# Patient Record
Sex: MALE | Race: WHITE | Employment: OTHER | URBAN - METROPOLITAN AREA
[De-identification: names, ages, dates, MRNs, and addresses within clinical notes are randomized per-mention and may not be internally consistent; named-entity substitution may affect disease eponyms.]

---

## 2017-07-17 ENCOUNTER — OP HISTORICAL/CONVERTED ENCOUNTER (OUTPATIENT)
Dept: OTHER | Age: 82
End: 2017-07-17

## 2017-07-19 ENCOUNTER — OP HISTORICAL/CONVERTED ENCOUNTER (OUTPATIENT)
Dept: OTHER | Age: 82
End: 2017-07-19

## 2020-07-27 PROBLEM — N32.0 BLADDER NECK OBSTRUCTION: Status: ACTIVE | Noted: 2020-07-27

## 2020-07-27 PROBLEM — C61 MALIGNANT NEOPLASM OF PROSTATE (HCC): Status: ACTIVE | Noted: 2020-07-27

## 2020-07-27 PROBLEM — R39.15 URGENCY OF URINATION: Status: ACTIVE | Noted: 2020-07-27

## 2020-07-27 PROBLEM — N13.8 ENLARGED PROSTATE WITH URINARY OBSTRUCTION: Status: ACTIVE | Noted: 2020-07-27

## 2020-07-27 PROBLEM — N40.1 ENLARGED PROSTATE WITH URINARY OBSTRUCTION: Status: ACTIVE | Noted: 2020-07-27

## 2020-07-27 PROBLEM — N41.1 CHRONIC PROSTATITIS: Status: ACTIVE | Noted: 2020-07-27

## 2020-07-27 PROBLEM — R31.9 HEMATURIA: Status: ACTIVE | Noted: 2020-07-27

## 2020-07-27 PROBLEM — R97.20 ELEVATED PROSTATE SPECIFIC ANTIGEN (PSA): Status: ACTIVE | Noted: 2020-07-27

## 2020-07-27 PROBLEM — N50.89 SCROTAL MASS: Status: ACTIVE | Noted: 2020-07-27

## 2020-07-27 PROBLEM — N48.9 LESION OF PENIS: Status: ACTIVE | Noted: 2020-07-27

## 2020-07-27 PROBLEM — N28.1 RENAL CYST: Status: ACTIVE | Noted: 2020-07-27

## 2020-07-27 PROBLEM — N30.20 CHRONIC CYSTITIS: Status: ACTIVE | Noted: 2020-07-27

## 2020-08-14 ENCOUNTER — OFFICE VISIT (OUTPATIENT)
Dept: UROLOGY | Age: 85
End: 2020-08-14
Payer: MEDICARE

## 2020-08-14 VITALS
HEIGHT: 65 IN | DIASTOLIC BLOOD PRESSURE: 79 MMHG | BODY MASS INDEX: 27.66 KG/M2 | WEIGHT: 166 LBS | SYSTOLIC BLOOD PRESSURE: 127 MMHG | TEMPERATURE: 98.1 F

## 2020-08-14 DIAGNOSIS — N13.8 ENLARGED PROSTATE WITH URINARY OBSTRUCTION: ICD-10-CM

## 2020-08-14 DIAGNOSIS — R39.15 URGENCY OF URINATION: ICD-10-CM

## 2020-08-14 DIAGNOSIS — N30.20 CHRONIC CYSTITIS: ICD-10-CM

## 2020-08-14 DIAGNOSIS — N40.1 ENLARGED PROSTATE WITH URINARY OBSTRUCTION: ICD-10-CM

## 2020-08-14 DIAGNOSIS — C61 MALIGNANT NEOPLASM OF PROSTATE (HCC): ICD-10-CM

## 2020-08-14 DIAGNOSIS — R33.9 RETENTION, URINE: Primary | ICD-10-CM

## 2020-08-14 DIAGNOSIS — R97.20 ELEVATED PROSTATE SPECIFIC ANTIGEN (PSA): ICD-10-CM

## 2020-08-14 DIAGNOSIS — N28.1 RENAL CYST: ICD-10-CM

## 2020-08-14 DIAGNOSIS — N32.0 BLADDER NECK OBSTRUCTION: ICD-10-CM

## 2020-08-14 DIAGNOSIS — N41.1 CHRONIC PROSTATITIS: ICD-10-CM

## 2020-08-14 LAB
BILIRUB UR QL STRIP: NEGATIVE
GLUCOSE UR-MCNC: NEGATIVE MG/DL
KETONES P FAST UR STRIP-MCNC: NEGATIVE MG/DL
PH UR STRIP: 5 [PH] (ref 4.6–8)
PROT UR QL STRIP: NEGATIVE
PVR POC: NORMAL CC
SP GR UR STRIP: 1.02 (ref 1–1.03)
UA UROBILINOGEN AMB POC: NORMAL (ref 0.2–1)
URINALYSIS CLARITY POC: CLEAR
URINALYSIS COLOR POC: YELLOW
URINE BLOOD POC: NEGATIVE
URINE LEUKOCYTES POC: NEGATIVE
URINE NITRITES POC: NEGATIVE

## 2020-08-14 PROCEDURE — 99213 OFFICE O/P EST LOW 20 MIN: CPT | Performed by: UROLOGY

## 2020-08-14 PROCEDURE — 51798 US URINE CAPACITY MEASURE: CPT | Performed by: UROLOGY

## 2020-08-14 PROCEDURE — 81003 URINALYSIS AUTO W/O SCOPE: CPT | Performed by: UROLOGY

## 2020-08-14 PROCEDURE — G8419 CALC BMI OUT NRM PARAM NOF/U: HCPCS | Performed by: UROLOGY

## 2020-08-14 PROCEDURE — G8427 DOCREV CUR MEDS BY ELIG CLIN: HCPCS | Performed by: UROLOGY

## 2020-08-14 NOTE — PROGRESS NOTES
HPI ROS PE NOTE          History of Present Illness   Chief complaint; Carcinoma of the prostate follow-up  Shawnee Colon is a 80 y.o. male who presents with of the prostate follow-up Kimi 6 disease found on biopsy in 2011 and one specimen after PSA went up to 8.0 while on dutasteride. The patient had brachytherapy treatment at that time. Had a prostate nodule which was the focus of the cancer and therefore he was given a boost of additional radiation therapy because of the nodule. Patient had a prior negative prostate biopsy done in 2005 when his PSA was 6.4. PSAs have remained normal in the low range ever since. Most recent PSA prior to this visit was less than 0.1 in September 2019. Patient has bladder outlet obstruction International prostate symptom score of 12. Stream and incomplete emptying of his bladder half the time, nocturia twice per night sometimes once urgency and frequency less than half the time and intermittency less than 1 time in 5 voids; continues to take finasteride 5 mg daily treatment. Previously on alfuzosin and I am not sure whether he is currently taking it ; patient has had additional difficulties with urinary and scrotal and pelvic burning intermittently and has occasional incontinence. He is generally satisfied with his voiding pattern.    Past Medical History:   Diagnosis Date    Arthritis     Asthma     Burning with urination     Cancer [facility])     Prostate Finished radiation 3/2012    COPD     Essential tremor     GERD (gastroesophageal reflux disease)     Hyperlipidemia     Hypertension     PAD [facility]peripheral artery disease) ([facility])       Past Surgical History:   Procedure Laterality Date    HC STNT SFA VIBN WLGO -H1  12/12/2005    LEFT Superficial Femoral Arter Stent    HX ENDOSCOPY      HX GI      Bleeding ulcers    HX HEART CATHETERIZATION      HX HEENT      L eye    HX HEENT      Cataract    HX ORTHOPAEDIC      R & L Carpal tunnel    HX UROLOGICAL Prostate Bx    HX UROLOGICAL      Brachytherapy     Family History   Problem Relation Age of Onset   [facility] Diabetes Mother     Diabetes Sister     Diabetes Brother       Social History     Tobacco Use    Smoking status: Former Smoker    Smokeless tobacco: Never Used   Substance Use Topics    Alcohol use: Not Currently     Comment: twice a month       Prior to Admission medications    Medication Sig Start Date End Date Taking? Authorizing Provider   ezetimibe (Zetia) 10 mg tablet Take  by mouth. Yes Provider, Historical   mometasone (NASONEX) 50 mcg/actuation nasal spray 2 Sprays daily. Yes Provider, Historical   cetirizine (ZYRTEC) 10 mg tablet Take  by mouth daily. Yes Provider, Historical   omega-3 fatty acids-vitamin e (FISH OIL) 1,000 mg Cap Take 1 Cap by mouth. Yes Provider, Historical   fluticasone-salmeterol (ADVAIR DISKUS) 250-50 mcg/Dose diskus inhaler Take 1 Puff by inhalation every twelve (12) hours. Yes Provider, Historical   simvastatin (ZOCOR) 40 mg tablet Take  by mouth nightly. Yes Provider, Historical   alfuzosin (UROXATRAL) 10 mg SR tablet Take  by mouth daily. Yes Provider, Historical   montelukast (SINGULAIR) 10 mg tablet Take 10 mg by mouth daily. Yes Provider, Historical   albuterol (PROVENTIL VENTOLIN) 2.5 mg /3 mL (0.083 %) nebulizer solution by Nebulization route once. Yes Provider, Historical   Calcium Carbonate-Vit D3-Min (CALTRATE 600+D PLUS MINERALS) 600 mg (1,500 mg)-400 unit Chew Take  by mouth. Yes Provider, Historical   MV-Min-Folic Acid-Lutein (CENTRUM SILVER) 500-250 mcg Chew Take  by mouth. Yes Provider, Historical   topiramate (TOPAMAX) 50 mg tablet Take  by mouth two (2) times a day. Provider, Historical   candesartan (ATACAND) 8 mg tablet Take  by mouth daily. Provider, Historical   finasteride (PROPECIA) 1 mg tablet Take 1 mg by mouth daily. Provider, Historical   psyllium (METAMUCIL SMOOTH TEXTURE) packet Take 1 Packet by mouth daily. Provider, Historical   gabapentin (NEURONTIN) 300 mg capsule Take 100 mg by mouth nightly. 6/8/11   Provider, Historical   esomeprazole (NEXIUM) 40 mg capsule Take  by mouth daily. Provider, Historical   hydrocortisone (HYCORT) 1 % ointment Apply  to affected area two (2) times a day. use thin layer     Provider, Historical   aspirin delayed-release 81 mg tablet Take 81 mg by mouth daily. Provider, Historical     No Known Allergies     Review of Systems:  Constitutional: negative  Respiratory: positive for emphysema  Cardiovascular: negative  Gastrointestinal: positive for dyspepsia, reflux symptoms, constipation and history of Arndt's esophagus, stable  Genitourinary:positive for frequency, dysuria, nocturia, urinary incontinence, decreased stream and Urgency and intermittency  Musculoskeletal:positive for myalgias, arthralgias, stiff joints and muscle weakness  Neurological: positive for coordination problems, gait problems and tremor     Physical Exam             Physical Exam:   Visit Vitals  /79   Temp 98.1 °F (36.7 °C) (Oral)   Ht 5' 5\" (1.651 m)   Wt 166 lb (75.3 kg)   BMI 27.62 kg/m²     General appearance: alert, cooperative, no distress, appears stated age  Head: Normocephalic, without obvious abnormality, atraumatic  Lungs: clear to auscultation bilaterally  Heart: regular rate and rhythm, S1, S2 normal, no murmur, click, rub or gallop  Abdomen: soft, non-tender.  Bowel sounds normal. No masses,  no organomegaly  Male genitalia: normal  Rectal: Normal tone, prostate enlarged flat no nodule  Extremities: extremities normal, atraumatic, no cyanosis, previous edema controlled with support hose  Skin: Skin color, texture, turgor normal. No rashes or lesions  Neurologic: Grossly normal        Data Review:   Recent Results (from the past 48 hour(s))   AMB POC URINALYSIS DIP STICK AUTO W/O MICRO    Collection Time: 08/14/20 11:11 AM   Result Value Ref Range    Color (UA POC) Yellow     Clarity (UA POC) Clear     Glucose (UA POC) Negative Negative    Bilirubin (UA POC) Negative Negative    Ketones (UA POC) Negative Negative    Specific gravity (UA POC) 1.025 1.001 - 1.035    Blood (UA POC) Negative Negative    pH (UA POC) 5.0 4.6 - 8.0    Protein (UA POC) Negative Negative    Urobilinogen (UA POC) 0.2 mg/dL 0.2 - 1    Nitrites (UA POC) Negative Negative    Leukocyte esterase (UA POC) Negative Negative   AMB POC PVR, KAROLYN,POST-VOID RES,US,NON-IMAGING    Collection Time: 08/14/20 12:02 PM   Result Value Ref Range    PVR 21mL cc     No results for input(s): 48 in the last 72 hours. Assessment and Plan:   Carcinoma of the prostate, Kimi 6 with bladder neck obstruction and symptoms of obstruction; recommend follow-up PSA test to assess stability of the cancer  History of incomplete bladder emptying, scan performed today demonstrated only a small residual urine. Return visit in 6 months with recheck of PSA. Provide letter to address [employer] disability related to prostate cancer    Mr. Alise Osgood has a reminder for a \"due or due soon\" health maintenance. I have asked that he contact his primary care provider for follow-up on this health maintenance. Casa Guy M.D.  8/14/2020

## 2020-08-14 NOTE — LETTER
8/14/20 Patient: Clarita Bynum YOB: 1931 Date of Visit: 8/14/2020 Amanda Moreno MD 
[address] [place] [id number] VIA Facsimile: [phone] Dear Amanda Moreno MD, Thank you for referring Mr. Deborah Gonzalez to [address] [place] [address] for evaluation. My notes for this consultation are attached. If you have questions, please do not hesitate to call me. I look forward to following your patient along with you. Sincerely, Juliet Meyer MD

## 2020-08-15 LAB — PSA SERPL-MCNC: <0.1 NG/ML (ref 0–4)

## 2020-08-16 LAB — BACTERIA UR CULT: NORMAL

## 2020-08-21 ENCOUNTER — TELEPHONE (OUTPATIENT)
Dept: UROLOGY | Age: 85
End: 2020-08-21

## 2020-08-21 NOTE — TELEPHONE ENCOUNTER
Pt's niece called and stated patient was in need of a refill of finasteride.     Pt's pharmacy is [place], [place].

## 2020-08-24 RX ORDER — FINASTERIDE 5 MG/1
5 TABLET, FILM COATED ORAL DAILY
Qty: 90 TAB | Refills: 2 | Status: SHIPPED | OUTPATIENT
Start: 2020-08-24 | End: 2021-05-21

## 2020-08-24 RX ORDER — FINASTERIDE 5 MG/1
5 TABLET, FILM COATED ORAL DAILY
COMMUNITY
End: 2020-08-24 | Stop reason: SDUPTHER

## 2020-08-24 NOTE — TELEPHONE ENCOUNTER
Alice Duarte ph # [phone] called to check status of Finasteride refill for her uncle. She was advised the request was submitted but unsure if it had not been authorized by Dr Tom Albright of if it was not submitting to [facility] successfully but this would be resubmitted. She asks that a call be made to her to advise of when this is completed and we can leave a message on her VM if she is unable to get to the phone.

## 2021-07-01 ENCOUNTER — HOSPITAL ENCOUNTER (OUTPATIENT)
Dept: LAB | Age: 86
Discharge: HOME | End: 2021-07-01

## 2021-07-01 LAB
ALBUMIN SERPL-MCNC: 2.9 G/DL (ref 3.5–5)
ALBUMIN/GLOB SERPL: 0.7 {RATIO} (ref 1.1–2.2)
ALP SERPL-CCNC: 56 U/L (ref 45–117)
ALT SERPL-CCNC: 78 U/L (ref 12–78)
ANION GAP SERPL CALC-SCNC: 6 MMOL/L (ref 5–15)
AST SERPL W P-5'-P-CCNC: 37 U/L (ref 15–37)
BASOPHILS # BLD: 0 K/UL (ref 0–0.1)
BASOPHILS NFR BLD: 1 % (ref 0–1)
BILIRUB SERPL-MCNC: 0.5 MG/DL (ref 0.2–1)
BUN SERPL-MCNC: 21 MG/DL (ref 6–20)
BUN/CREAT SERPL: 27 (ref 12–20)
CA-I BLD-MCNC: 8.9 MG/DL (ref 8.5–10.1)
CHLORIDE SERPL-SCNC: 107 MMOL/L (ref 97–108)
CO2 SERPL-SCNC: 25 MMOL/L (ref 21–32)
CREAT SERPL-MCNC: 0.79 MG/DL (ref 0.7–1.3)
DIFFERENTIAL METHOD BLD: ABNORMAL
EOSINOPHIL # BLD: 0.2 K/UL (ref 0–0.4)
EOSINOPHIL NFR BLD: 2 % (ref 0–7)
ERYTHROCYTE [DISTWIDTH] IN BLOOD BY AUTOMATED COUNT: 14.5 % (ref 11.5–14.5)
GLOBULIN SER CALC-MCNC: 3.9 G/DL (ref 2–4)
GLUCOSE SERPL-MCNC: 92 MG/DL (ref 65–100)
HCT VFR BLD AUTO: 37.7 % (ref 36.6–50.3)
HGB BLD-MCNC: 11.8 G/DL (ref 12.1–17)
IMM GRANULOCYTES # BLD AUTO: 0 K/UL (ref 0–0.04)
IMM GRANULOCYTES NFR BLD AUTO: 0 % (ref 0–0.5)
LYMPHOCYTES # BLD: 1.4 K/UL (ref 0.8–3.5)
LYMPHOCYTES NFR BLD: 18 % (ref 12–49)
MCH RBC QN AUTO: 30.7 PG (ref 26–34)
MCHC RBC AUTO-ENTMCNC: 31.3 G/DL (ref 30–36.5)
MCV RBC AUTO: 98.2 FL (ref 80–99)
MONOCYTES # BLD: 0.7 K/UL (ref 0–1)
MONOCYTES NFR BLD: 9 % (ref 5–13)
NEUTS SEG # BLD: 5.6 K/UL (ref 1.8–8)
NEUTS SEG NFR BLD: 70 % (ref 32–75)
NRBC # BLD: 0 K/UL (ref 0–0.01)
NRBC BLD-RTO: 0 PER 100 WBC
PLATELET # BLD AUTO: 131 K/UL (ref 150–400)
PMV BLD AUTO: 12.1 FL (ref 8.9–12.9)
POTASSIUM SERPL-SCNC: 4.3 MMOL/L (ref 3.5–5.1)
PROT SERPL-MCNC: 6.8 G/DL (ref 6.4–8.2)
RBC # BLD AUTO: 3.84 M/UL (ref 4.1–5.7)
SODIUM SERPL-SCNC: 138 MMOL/L (ref 136–145)
WBC # BLD AUTO: 8 K/UL (ref 4.1–11.1)

## 2021-07-01 PROCEDURE — 80053 COMPREHEN METABOLIC PANEL: CPT

## 2021-07-01 PROCEDURE — 85025 COMPLETE CBC W/AUTO DIFF WBC: CPT

## 2021-08-04 PROBLEM — E78.5 HYPERLIPIDEMIA: Status: ACTIVE | Noted: 2021-08-04

## 2021-08-04 PROBLEM — R06.00 DYSPNEA: Status: ACTIVE | Noted: 2021-08-04

## 2021-08-04 PROBLEM — R25.1 TREMOR: Status: ACTIVE | Noted: 2021-08-04

## 2021-08-04 PROBLEM — I10 BENIGN ESSENTIAL HYPERTENSION: Status: ACTIVE | Noted: 2021-08-04

## 2021-08-08 PROBLEM — R32 URINARY INCONTINENCE: Status: ACTIVE | Noted: 2021-08-08

## 2021-08-08 PROBLEM — N30.20 CHRONIC CYSTITIS: Status: RESOLVED | Noted: 2020-07-27 | Resolved: 2021-08-08

## 2021-08-08 PROBLEM — R97.20 ELEVATED PROSTATE SPECIFIC ANTIGEN (PSA): Status: RESOLVED | Noted: 2020-07-27 | Resolved: 2021-08-08

## 2021-08-08 PROBLEM — R39.15 URGENCY OF URINATION: Status: RESOLVED | Noted: 2020-07-27 | Resolved: 2021-08-08

## 2021-08-08 PROBLEM — N32.0 BLADDER NECK OBSTRUCTION: Status: RESOLVED | Noted: 2020-07-27 | Resolved: 2021-08-08

## 2021-08-08 PROBLEM — R32 URINARY INCONTINENCE: Status: RESOLVED | Noted: 2021-08-08 | Resolved: 2021-08-08

## 2022-07-11 ENCOUNTER — HOSPITAL ENCOUNTER (INPATIENT)
Age: 87
LOS: 3 days | Discharge: NURSING, REHAB OR LONG-TERM CARE | DRG: 956 | End: 2022-07-14
Attending: EMERGENCY MEDICINE | Admitting: INTERNAL MEDICINE
Payer: MEDICARE

## 2022-07-11 ENCOUNTER — APPOINTMENT (OUTPATIENT)
Dept: CT IMAGING | Age: 87
DRG: 956 | End: 2022-07-11
Attending: EMERGENCY MEDICINE
Payer: MEDICARE

## 2022-07-11 ENCOUNTER — APPOINTMENT (OUTPATIENT)
Dept: GENERAL RADIOLOGY | Age: 87
DRG: 956 | End: 2022-07-11
Attending: ORTHOPAEDIC SURGERY
Payer: MEDICARE

## 2022-07-11 ENCOUNTER — APPOINTMENT (OUTPATIENT)
Dept: GENERAL RADIOLOGY | Age: 87
DRG: 956 | End: 2022-07-11
Attending: EMERGENCY MEDICINE
Payer: MEDICARE

## 2022-07-11 ENCOUNTER — APPOINTMENT (OUTPATIENT)
Dept: GENERAL RADIOLOGY | Age: 87
DRG: 956 | End: 2022-07-11
Attending: PHYSICIAN ASSISTANT
Payer: MEDICARE

## 2022-07-11 DIAGNOSIS — N32.89 MASS OF URINARY BLADDER: ICD-10-CM

## 2022-07-11 DIAGNOSIS — S70.12XA HEMATOMA OF LEFT ILIOPSOAS MUSCLE, INITIAL ENCOUNTER: ICD-10-CM

## 2022-07-11 DIAGNOSIS — T79.6XXA TRAUMATIC RHABDOMYOLYSIS, INITIAL ENCOUNTER (HCC): ICD-10-CM

## 2022-07-11 DIAGNOSIS — S72.142A INTERTROCHANTERIC FRACTURE OF LEFT HIP, CLOSED, INITIAL ENCOUNTER (HCC): Primary | ICD-10-CM

## 2022-07-11 DIAGNOSIS — S22.42XA CLOSED FRACTURE OF MULTIPLE RIBS OF LEFT SIDE, INITIAL ENCOUNTER: ICD-10-CM

## 2022-07-11 PROBLEM — S72.009A FRACTURE, HIP (HCC): Status: ACTIVE | Noted: 2022-07-11

## 2022-07-11 PROBLEM — M62.82 RHABDOMYOLYSIS: Status: ACTIVE | Noted: 2022-07-11

## 2022-07-11 LAB
ALBUMIN SERPL-MCNC: 3.3 G/DL (ref 3.5–5)
ALBUMIN/GLOB SERPL: 0.8 {RATIO} (ref 1.1–2.2)
ALP SERPL-CCNC: 66 U/L (ref 45–117)
ALT SERPL-CCNC: 35 U/L (ref 12–78)
AMMONIA PLAS-SCNC: <10 UMOL/L
ANION GAP SERPL CALC-SCNC: 7 MMOL/L (ref 5–15)
APPEARANCE UR: CLEAR
AST SERPL W P-5'-P-CCNC: 104 U/L (ref 15–37)
ATRIAL RATE: 84 BPM
BACTERIA URNS QL MICRO: NEGATIVE /HPF
BASOPHILS # BLD: 0 K/UL (ref 0–0.1)
BASOPHILS NFR BLD: 0 % (ref 0–1)
BILIRUB SERPL-MCNC: 0.9 MG/DL (ref 0.2–1)
BILIRUB UR QL: NEGATIVE
BUN SERPL-MCNC: 43 MG/DL (ref 6–20)
BUN/CREAT SERPL: 39 (ref 12–20)
CA-I BLD-MCNC: 9.2 MG/DL (ref 8.5–10.1)
CALCULATED R AXIS, ECG10: 77 DEGREES
CALCULATED T AXIS, ECG11: 62 DEGREES
CHLORIDE SERPL-SCNC: 104 MMOL/L (ref 97–108)
CK SERPL-CCNC: 1151 U/L (ref 39–308)
CK SERPL-CCNC: 1679 U/L (ref 39–308)
CK SERPL-CCNC: 2251 U/L (ref 39–308)
CO2 SERPL-SCNC: 28 MMOL/L (ref 21–32)
COLOR UR: ABNORMAL
COVID-19 RAPID TEST, COVR: NOT DETECTED
CREAT SERPL-MCNC: 1.1 MG/DL (ref 0.7–1.3)
DIAGNOSIS, 93000: NORMAL
DIFFERENTIAL METHOD BLD: ABNORMAL
EOSINOPHIL # BLD: 0 K/UL (ref 0–0.4)
EOSINOPHIL NFR BLD: 0 % (ref 0–7)
ERYTHROCYTE [DISTWIDTH] IN BLOOD BY AUTOMATED COUNT: 14.8 % (ref 11.5–14.5)
GLOBULIN SER CALC-MCNC: 4.4 G/DL (ref 2–4)
GLUCOSE SERPL-MCNC: 135 MG/DL (ref 65–100)
GLUCOSE UR STRIP.AUTO-MCNC: NEGATIVE MG/DL
HCT VFR BLD AUTO: 37 % (ref 36.6–50.3)
HGB BLD-MCNC: 11.9 G/DL (ref 12.1–17)
HGB UR QL STRIP: NEGATIVE
IMM GRANULOCYTES # BLD AUTO: 0.1 K/UL (ref 0–0.04)
IMM GRANULOCYTES NFR BLD AUTO: 0 % (ref 0–0.5)
INR PPP: 1.6 (ref 0.9–1.1)
KETONES UR QL STRIP.AUTO: 5 MG/DL
LACTATE SERPL-SCNC: 1.5 MMOL/L (ref 0.4–2)
LACTATE SERPL-SCNC: 2.2 MMOL/L (ref 0.4–2)
LEUKOCYTE ESTERASE UR QL STRIP.AUTO: NEGATIVE
LYMPHOCYTES # BLD: 0.6 K/UL (ref 0.8–3.5)
LYMPHOCYTES NFR BLD: 3 % (ref 12–49)
MAGNESIUM SERPL-MCNC: 2.6 MG/DL (ref 1.6–2.4)
MCH RBC QN AUTO: 30.2 PG (ref 26–34)
MCHC RBC AUTO-ENTMCNC: 32.2 G/DL (ref 30–36.5)
MCV RBC AUTO: 93.9 FL (ref 80–99)
MONOCYTES # BLD: 2 K/UL (ref 0–1)
MONOCYTES NFR BLD: 10 % (ref 5–13)
MRSA DNA SPEC QL NAA+PROBE: NOT DETECTED
MUCOUS THREADS URNS QL MICRO: ABNORMAL /LPF
NEUTS SEG # BLD: 17.9 K/UL (ref 1.8–8)
NEUTS SEG NFR BLD: 87 % (ref 32–75)
NITRITE UR QL STRIP.AUTO: NEGATIVE
NRBC # BLD: 0 K/UL (ref 0–0.01)
NRBC BLD-RTO: 0 PER 100 WBC
PH UR STRIP: 5 [PH] (ref 5–8)
PLATELET # BLD AUTO: 175 K/UL (ref 150–400)
PMV BLD AUTO: 12 FL (ref 8.9–12.9)
POTASSIUM SERPL-SCNC: 5.1 MMOL/L (ref 3.5–5.1)
PROCALCITONIN SERPL-MCNC: 0.24 NG/ML
PROT SERPL-MCNC: 7.7 G/DL (ref 6.4–8.2)
PROT UR STRIP-MCNC: NEGATIVE MG/DL
PROTHROMBIN TIME: 19.2 SEC (ref 11.9–14.6)
Q-T INTERVAL, ECG07: 396 MS
QRS DURATION, ECG06: 150 MS
QTC CALCULATION (BEZET), ECG08: 467 MS
RBC # BLD AUTO: 3.94 M/UL (ref 4.1–5.7)
RBC #/AREA URNS HPF: ABNORMAL /HPF (ref 0–5)
SODIUM SERPL-SCNC: 139 MMOL/L (ref 136–145)
SP GR UR REFRACTOMETRY: 1.02 (ref 1–1.03)
TROPONIN-HIGH SENSITIVITY: 38 NG/L (ref 0–76)
TROPONIN-HIGH SENSITIVITY: 43 NG/L (ref 0–76)
TROPONIN-HIGH SENSITIVITY: 43 NG/L (ref 0–76)
TROPONIN-HIGH SENSITIVITY: 48 NG/L (ref 0–76)
UA: UC IF INDICATED,UAUC: ABNORMAL
UROBILINOGEN UR QL STRIP.AUTO: 0.1 EU/DL (ref 0.1–1)
VENTRICULAR RATE, ECG03: 84 BPM
WBC # BLD AUTO: 20.6 K/UL (ref 4.1–11.1)
WBC URNS QL MICRO: ABNORMAL /HPF (ref 0–4)

## 2022-07-11 PROCEDURE — 87040 BLOOD CULTURE FOR BACTERIA: CPT

## 2022-07-11 PROCEDURE — 81001 URINALYSIS AUTO W/SCOPE: CPT

## 2022-07-11 PROCEDURE — 73502 X-RAY EXAM HIP UNI 2-3 VIEWS: CPT

## 2022-07-11 PROCEDURE — 36415 COLL VENOUS BLD VENIPUNCTURE: CPT

## 2022-07-11 PROCEDURE — 87641 MR-STAPH DNA AMP PROBE: CPT

## 2022-07-11 PROCEDURE — 82550 ASSAY OF CK (CPK): CPT

## 2022-07-11 PROCEDURE — 99285 EMERGENCY DEPT VISIT HI MDM: CPT

## 2022-07-11 PROCEDURE — 74011000250 HC RX REV CODE- 250: Performed by: INTERNAL MEDICINE

## 2022-07-11 PROCEDURE — 72125 CT NECK SPINE W/O DYE: CPT

## 2022-07-11 PROCEDURE — 92610 EVALUATE SWALLOWING FUNCTION: CPT

## 2022-07-11 PROCEDURE — 74177 CT ABD & PELVIS W/CONTRAST: CPT

## 2022-07-11 PROCEDURE — 71260 CT THORAX DX C+: CPT

## 2022-07-11 PROCEDURE — 71045 X-RAY EXAM CHEST 1 VIEW: CPT

## 2022-07-11 PROCEDURE — 87635 SARS-COV-2 COVID-19 AMP PRB: CPT

## 2022-07-11 PROCEDURE — 74011000636 HC RX REV CODE- 636: Performed by: EMERGENCY MEDICINE

## 2022-07-11 PROCEDURE — 73552 X-RAY EXAM OF FEMUR 2/>: CPT

## 2022-07-11 PROCEDURE — 80053 COMPREHEN METABOLIC PANEL: CPT

## 2022-07-11 PROCEDURE — 74011250636 HC RX REV CODE- 250/636: Performed by: INTERNAL MEDICINE

## 2022-07-11 PROCEDURE — 84145 PROCALCITONIN (PCT): CPT

## 2022-07-11 PROCEDURE — 73030 X-RAY EXAM OF SHOULDER: CPT

## 2022-07-11 PROCEDURE — 82140 ASSAY OF AMMONIA: CPT

## 2022-07-11 PROCEDURE — 96374 THER/PROPH/DIAG INJ IV PUSH: CPT

## 2022-07-11 PROCEDURE — 65270000029 HC RM PRIVATE

## 2022-07-11 PROCEDURE — 87086 URINE CULTURE/COLONY COUNT: CPT

## 2022-07-11 PROCEDURE — 85610 PROTHROMBIN TIME: CPT

## 2022-07-11 PROCEDURE — 83735 ASSAY OF MAGNESIUM: CPT

## 2022-07-11 PROCEDURE — 83605 ASSAY OF LACTIC ACID: CPT

## 2022-07-11 PROCEDURE — 74011000258 HC RX REV CODE- 258: Performed by: EMERGENCY MEDICINE

## 2022-07-11 PROCEDURE — 85025 COMPLETE CBC W/AUTO DIFF WBC: CPT

## 2022-07-11 PROCEDURE — 70450 CT HEAD/BRAIN W/O DYE: CPT

## 2022-07-11 PROCEDURE — 84484 ASSAY OF TROPONIN QUANT: CPT

## 2022-07-11 PROCEDURE — 93005 ELECTROCARDIOGRAM TRACING: CPT

## 2022-07-11 PROCEDURE — 74011250637 HC RX REV CODE- 250/637: Performed by: PHYSICIAN ASSISTANT

## 2022-07-11 PROCEDURE — 73010 X-RAY EXAM OF SHOULDER BLADE: CPT

## 2022-07-11 PROCEDURE — 73080 X-RAY EXAM OF ELBOW: CPT

## 2022-07-11 PROCEDURE — 74011250636 HC RX REV CODE- 250/636: Performed by: EMERGENCY MEDICINE

## 2022-07-11 RX ORDER — LEVOFLOXACIN 5 MG/ML
750 INJECTION, SOLUTION INTRAVENOUS EVERY 24 HOURS
Status: DISCONTINUED | OUTPATIENT
Start: 2022-07-11 | End: 2022-07-11

## 2022-07-11 RX ORDER — LEVOFLOXACIN 5 MG/ML
750 INJECTION, SOLUTION INTRAVENOUS
Status: DISCONTINUED | OUTPATIENT
Start: 2022-07-13 | End: 2022-07-14 | Stop reason: HOSPADM

## 2022-07-11 RX ORDER — IPRATROPIUM BROMIDE AND ALBUTEROL SULFATE 2.5; .5 MG/3ML; MG/3ML
3 SOLUTION RESPIRATORY (INHALATION)
Status: DISCONTINUED | OUTPATIENT
Start: 2022-07-11 | End: 2022-07-14 | Stop reason: HOSPADM

## 2022-07-11 RX ORDER — LEVOFLOXACIN 5 MG/ML
750 INJECTION, SOLUTION INTRAVENOUS ONCE
Status: COMPLETED | OUTPATIENT
Start: 2022-07-11 | End: 2022-07-11

## 2022-07-11 RX ORDER — LEVOFLOXACIN 5 MG/ML
750 INJECTION, SOLUTION INTRAVENOUS
Status: DISCONTINUED | OUTPATIENT
Start: 2022-07-13 | End: 2022-07-11 | Stop reason: SDUPTHER

## 2022-07-11 RX ORDER — BUDESONIDE AND FORMOTEROL FUMARATE DIHYDRATE 160; 4.5 UG/1; UG/1
2 AEROSOL RESPIRATORY (INHALATION) 2 TIMES DAILY
Status: DISCONTINUED | OUTPATIENT
Start: 2022-07-12 | End: 2022-07-14 | Stop reason: HOSPADM

## 2022-07-11 RX ORDER — ONDANSETRON 4 MG/1
4 TABLET, ORALLY DISINTEGRATING ORAL
Status: DISCONTINUED | OUTPATIENT
Start: 2022-07-11 | End: 2022-07-14 | Stop reason: HOSPADM

## 2022-07-11 RX ORDER — CELECOXIB 100 MG/1
100 CAPSULE ORAL 2 TIMES DAILY
Status: DISCONTINUED | OUTPATIENT
Start: 2022-07-11 | End: 2022-07-12 | Stop reason: SDUPTHER

## 2022-07-11 RX ORDER — ONDANSETRON 2 MG/ML
4 INJECTION INTRAMUSCULAR; INTRAVENOUS
Status: DISCONTINUED | OUTPATIENT
Start: 2022-07-11 | End: 2022-07-14 | Stop reason: HOSPADM

## 2022-07-11 RX ORDER — ACETAMINOPHEN 650 MG/1
650 SUPPOSITORY RECTAL
Status: DISCONTINUED | OUTPATIENT
Start: 2022-07-11 | End: 2022-07-12 | Stop reason: SDUPTHER

## 2022-07-11 RX ORDER — ACETAMINOPHEN 325 MG/1
650 TABLET ORAL
Status: DISCONTINUED | OUTPATIENT
Start: 2022-07-11 | End: 2022-07-12 | Stop reason: SDUPTHER

## 2022-07-11 RX ORDER — POLYETHYLENE GLYCOL 3350 17 G/17G
17 POWDER, FOR SOLUTION ORAL DAILY PRN
Status: DISCONTINUED | OUTPATIENT
Start: 2022-07-11 | End: 2022-07-14 | Stop reason: HOSPADM

## 2022-07-11 RX ORDER — SODIUM CHLORIDE 0.9 % (FLUSH) 0.9 %
5-40 SYRINGE (ML) INJECTION AS NEEDED
Status: DISCONTINUED | OUTPATIENT
Start: 2022-07-11 | End: 2022-07-12 | Stop reason: SDUPTHER

## 2022-07-11 RX ORDER — SODIUM CHLORIDE 0.9 % (FLUSH) 0.9 %
5-40 SYRINGE (ML) INJECTION EVERY 8 HOURS
Status: DISCONTINUED | OUTPATIENT
Start: 2022-07-11 | End: 2022-07-12 | Stop reason: SDUPTHER

## 2022-07-11 RX ORDER — SODIUM CHLORIDE 9 MG/ML
100 INJECTION, SOLUTION INTRAVENOUS CONTINUOUS
Status: DISCONTINUED | OUTPATIENT
Start: 2022-07-11 | End: 2022-07-12 | Stop reason: SDUPTHER

## 2022-07-11 RX ADMIN — CELECOXIB 100 MG: 100 CAPSULE ORAL at 18:23

## 2022-07-11 RX ADMIN — IOPAMIDOL 100 ML: 755 INJECTION, SOLUTION INTRAVENOUS at 13:19

## 2022-07-11 RX ADMIN — SODIUM CHLORIDE 500 ML: 9 INJECTION, SOLUTION INTRAVENOUS at 17:11

## 2022-07-11 RX ADMIN — PIPERACILLIN AND TAZOBACTAM 4.5 G: 4; .5 INJECTION, POWDER, FOR SOLUTION INTRAVENOUS at 12:02

## 2022-07-11 RX ADMIN — CELECOXIB 100 MG: 100 CAPSULE ORAL at 20:30

## 2022-07-11 RX ADMIN — SODIUM CHLORIDE 1000 ML: 9 INJECTION, SOLUTION INTRAVENOUS at 12:04

## 2022-07-11 RX ADMIN — SODIUM CHLORIDE 1000 ML: 9 INJECTION, SOLUTION INTRAVENOUS at 11:21

## 2022-07-11 RX ADMIN — LEVOFLOXACIN 750 MG: 5 INJECTION, SOLUTION INTRAVENOUS at 16:24

## 2022-07-11 RX ADMIN — SODIUM CHLORIDE, PRESERVATIVE FREE 10 ML: 5 INJECTION INTRAVENOUS at 16:43

## 2022-07-11 RX ADMIN — SODIUM CHLORIDE 100 ML/HR: 9 INJECTION, SOLUTION INTRAVENOUS at 18:25

## 2022-07-11 RX ADMIN — SODIUM CHLORIDE 1000 ML: 9 INJECTION, SOLUTION INTRAVENOUS at 16:18

## 2022-07-11 RX ADMIN — PIPERACILLIN AND TAZOBACTAM 3.38 G: 3; .375 INJECTION, POWDER, LYOPHILIZED, FOR SOLUTION INTRAVENOUS at 20:30

## 2022-07-11 NOTE — ROUTINE PROCESS
TRANSFER - OUT REPORT:    Verbal report given to QUYNH ERI [facility] (name) on Leo Gurrola  being transferred to 440(unit) for routine progression of care       Report consisted of patients Situation, Background, Assessment and   Recommendations(SBAR). Information from the following report(s) SBAR and ED Summary was reviewed with the receiving nurse. Lines:   Peripheral IV 07/11/22 Anterior;Right Hand (Active)        Opportunity for questions and clarification was provided.       Patient transported with:   [email or web address]

## 2022-07-11 NOTE — CONSULTS
ORTHOPEDIC CONSULT    Patient: Lauren Casiano MRN: [id number]  SSN: [id number]    YOB: 1931  Age: 80 y.o. Sex: male      Subjective:      Lauren Casiano is a 80 y.o. male who is being seen in orthopedic consultation in the emergency room for a left hip fracture. Patient is a poor historian. History of dementia versus encephalopathy. History obtained from chart. Apparently the patient lives alone and has a caretaker during the day. EMS was called for wellness check, forced entry and patient was found lying on the floor. Unsure how long the patient was there. It is noted that the patient does have a WebCam he was last seen on Saturday. He appears comfortable in stretcher my arrival.  He is complaining of mild pain of his left hip region. He is also complaining of pain of his left knee. He denies any injury to his head or cervical spine at this time. He is noted to have had pain of his right shoulder. No complaints of pain of his right shoulder at this time. He denies any other musculoskeletal complaints at this time.   Past Medical History:   Diagnosis Date    Arthritis     Asthma     Burning with urination     Cancer [facility])     Prostate Finished radiation 3/2012    COPD     Essential tremor     GERD (gastroesophageal reflux disease)     Hyperlipidemia     Hypertension     PAD (peripheral artery disease) ([facility])      Past Surgical History:   Procedure Laterality Date    HC STNT SFA VIBN WLGO -H1  12/12/2005    LEFT Superficial Femoral Arter Stent    HX ENDOSCOPY      HX GI      Bleeding ulcers    HX HEART CATHETERIZATION      HX HEENT      L eye    HX HEENT      Cataract    HX ORTHOPAEDIC      R & L Carpal tunnel    HX UROLOGICAL  05/11/2011    Prostate Bx    HX UROLOGICAL      Brachytherapy    HX UROLOGICAL  05/11/2011    cysto, BRP      Family History   Problem Relation Age of Onset    Diabetes Mother     Diabetes Sister     Diabetes Brother      Social History Tobacco Use    Smoking status: Former Smoker    Smokeless tobacco: Never Used   Substance Use Topics    Alcohol use: Not Currently     Comment: twice a month      Prior to Admission medications    Medication Sig Start Date End Date Taking? Authorizing Provider   topiramate (TOPAMAX) 50 mg tablet Take  by mouth two (2) times a day. Provider, Historical   ezetimibe (Zetia) 10 mg tablet Take  by mouth. Provider, Historical   mometasone (NASONEX) 50 mcg/actuation nasal spray 2 Sprays daily. Provider, Historical   candesartan (ATACAND) 8 mg tablet Take  by mouth daily. Provider, Historical   psyllium (METAMUCIL SMOOTH TEXTURE) packet Take 1 Packet by mouth daily. Provider, Historical   cetirizine (ZYRTEC) 10 mg tablet Take  by mouth daily. Provider, Historical   gabapentin (NEURONTIN) 300 mg capsule Take 100 mg by mouth nightly. 6/8/11   Provider, Historical   omega-3 fatty acids-vitamin e (FISH OIL) 1,000 mg Cap Take 1 Cap by mouth. Provider, Historical   fluticasone-salmeterol (ADVAIR DISKUS) 250-50 mcg/Dose diskus inhaler Take 1 Puff by inhalation every twelve (12) hours. Provider, Historical   simvastatin (ZOCOR) 40 mg tablet Take  by mouth nightly. Provider, Historical   alfuzosin (UROXATRAL) 10 mg SR tablet Take  by mouth daily. Provider, Historical   montelukast (SINGULAIR) 10 mg tablet Take 10 mg by mouth daily. Provider, Historical   esomeprazole (NEXIUM) 40 mg capsule Take  by mouth daily. Provider, Historical   albuterol (PROVENTIL VENTOLIN) 2.5 mg /3 mL (0.083 %) nebulizer solution by Nebulization route once. Provider, Historical   hydrocortisone (HYCORT) 1 % ointment Apply  to affected area two (2) times a day. use thin layer     Provider, Historical   aspirin delayed-release 81 mg tablet Take 81 mg by mouth daily. Provider, Historical   Calcium Carbonate-Vit D3-Min (CALTRATE 600+D PLUS MINERALS) 600 mg (1,500 mg)-400 unit Chew Take  by mouth.     Provider, Historical   MV-Min-Folic Acid-Lutein (CENTRUM SILVER) 500-250 mcg Chew Take  by mouth. Provider, Historical       Allergies   Allergen Reactions    Nut - Unspecified Not Reported This Time       Review of Systems:  Review of Systems   Respiratory: Negative. Cardiovascular: Negative. Musculoskeletal: Positive for joint pain. Left hip and left knee    review of systems limited      Objective:     Current Facility-Administered Medications   Medication Dose Route Frequency    sodium chloride 0.9 % bolus infusion 1,000 mL  1,000 mL IntraVENous ONCE    Followed by    sodium chloride 0.9 % bolus infusion 500 mL  500 mL IntraVENous ONCE    piperacillin-tazobactam (ZOSYN) 3.375 g in 0.9% sodium chloride (MBP/ADV) 100 mL MBP  3.375 g IntraVENous Q8H    [START ON 7/13/2022] levoFLOXacin (LEVAQUIN) 750 mg in D5W IVPB  750 mg IntraVENous Q48H    levoFLOXacin (LEVAQUIN) 750 mg in D5W IVPB  750 mg IntraVENous ONCE    sodium chloride (NS) flush 5-40 mL  5-40 mL IntraVENous Q8H    sodium chloride (NS) flush 5-40 mL  5-40 mL IntraVENous PRN    acetaminophen (TYLENOL) tablet 650 mg  650 mg Oral Q6H PRN    Or    acetaminophen (TYLENOL) suppository 650 mg  650 mg Rectal Q6H PRN    polyethylene glycol (MIRALAX) packet 17 g  17 g Oral DAILY PRN    ondansetron (ZOFRAN ODT) tablet 4 mg  4 mg Oral Q8H PRN    Or    ondansetron (ZOFRAN) injection 4 mg  4 mg IntraVENous Q6H PRN    0.9% sodium chloride infusion  100 mL/hr IntraVENous CONTINUOUS    albuterol-ipratropium (DUO-NEB) 2.5 MG-0.5 MG/3 ML  3 mL Nebulization Q4H PRN     Current Outpatient Medications   Medication Sig    topiramate (TOPAMAX) 50 mg tablet Take  by mouth two (2) times a day.  ezetimibe (Zetia) 10 mg tablet Take  by mouth.  mometasone (NASONEX) 50 mcg/actuation nasal spray 2 Sprays daily.  candesartan (ATACAND) 8 mg tablet Take  by mouth daily.  psyllium (METAMUCIL SMOOTH TEXTURE) packet Take 1 Packet by mouth daily.     cetirizine (ZYRTEC) 10 mg tablet Take  by mouth daily.  gabapentin (NEURONTIN) 300 mg capsule Take 100 mg by mouth nightly.  omega-3 fatty acids-vitamin e (FISH OIL) 1,000 mg Cap Take 1 Cap by mouth.  fluticasone-salmeterol (ADVAIR DISKUS) 250-50 mcg/Dose diskus inhaler Take 1 Puff by inhalation every twelve (12) hours.  simvastatin (ZOCOR) 40 mg tablet Take  by mouth nightly.  alfuzosin (UROXATRAL) 10 mg SR tablet Take  by mouth daily.  montelukast (SINGULAIR) 10 mg tablet Take 10 mg by mouth daily.  esomeprazole (NEXIUM) 40 mg capsule Take  by mouth daily.  albuterol (PROVENTIL VENTOLIN) 2.5 mg /3 mL (0.083 %) nebulizer solution by Nebulization route once.  hydrocortisone (HYCORT) 1 % ointment Apply  to affected area two (2) times a day. use thin layer     aspirin delayed-release 81 mg tablet Take 81 mg by mouth daily.  Calcium Carbonate-Vit D3-Min (CALTRATE 600+D PLUS MINERALS) 600 mg (1,500 mg)-400 unit Chew Take  by mouth.  MV-Min-Folic Acid-Lutein (CENTRUM SILVER) 500-250 mcg Chew Take  by mouth. Vitals:    07/11/22 1001   BP: 136/72   Pulse: 89   Resp: 14   Temp: 99.3 °F (37.4 °C)   SpO2: 100%   Weight: 74.8 kg (165 lb)   Height: 5' 10\" (1.778 m)        Alert to person, No apparent distress    Physical Exam:  Left lower extremity: No foreshortening seen. External rotation is present of his left lower extremity compared to the right. Skin is warm and dry throughout left lower extremity. There is an abrasion seen over the medial aspect of his left knee. This area was tender to touch. No tenderness palpation throughout rest of left lower extremity. Active range of motion left knee was 0 to 20 degrees limited secondary to pain of his left hip. Passive range of motion was similar limited secondary to pain of his left hip. No calf pain to palpation. DP/PT pulses are not palpable bilaterally. Cap refill is 2 seconds. EHL/dorsiflexion/plantarflexion is 5 out of 5. Negative Homans. Lower extremity appears neurovascularly intact. Right shoulder/upper extremity: There is mild tenderness to palpation over the anterolateral aspect of his right shoulder. Active range of motion is limited with moderate crepitus. There was mild tenderness to passive range of motion to external rotation and AB duction. Full range of motion right humerus wrist hand and fingers without tenderness.  strength is 4 out of 5. EPL intact. Radial pulse palpable. No tenderness palpation throughout the rest of the right upper extremity. Right upper extremity peers neurovascularly intact. Labs:  CBC:  Recent Labs     07/11/22  1030   WBC 20.6*   RBC 3.94*   HGB 11.9*   HCT 37.0   MCV 93.9   RDW 14.8*        CHEMISTRIES:  Recent Labs     07/11/22  1100 07/11/22  1030   NA  --  139   K  --  5.1   CL  --  104   CO2  --  28   BUN  --  43*   CREA  --  1.10   CA  --  9.2   MG 2.6*  --    PT/INR:  Recent Labs     07/11/22  1115   INR 1.6*     APTT:No results for input(s): APTT in the last 72 hours. LIVER PROFILE:  Recent Labs     07/11/22  1030   *   ALT 35       IMAGING:  X-rays taken of his  bilateral hips and pelvis show a slightly displaced intertrochanteric fracture of his left hip. Moderate osteopenia seen throughout pelvic girdle. No other acute fracture seen. CT scan taken of his abdomen pelvis confirms comminuted intertrochanteric fracture of his left hip. There was chronic bilateral rib fractures. Edema/contusion was seen in the left retroperitoneum suspicious of intramuscular hematoma. Right bladder mass is present as well. A 6 mm calcified nodule is noted in the left upper lobe. X-rays taken of his right shoulder show no acute fractures.   Assessment/Plan:     Hospital Problems  Date Reviewed: 8/14/2020          Codes Class Noted POA    Rhabdomyolysis ICD-10-CM: M62.82  ICD-9-CM: 728.88  7/11/2022 Unknown        [facility] ICD-10-CM: S72.009A  ICD-9-CM: 820.8  7/11/2022 Unknown          Intertrochanteric fracture left hip  Given the fact that this patient is a household community ambulator and has been able to provide care for himself mainly I think he would benefit from surgical intervention consisting of open reduction fixation of his right hip  I have placed a call to his caregiver Wilfredo Membreno at [phone] who state Ronny Bowling is his niece and next of kin. She states Dolly Sheldon has been in contact with Dr. Jaswant Turner already. We will plan for surgical intervention once patient is cleared to proceed from medical team and rhabdomyolysis has been worked up. We will order x-rays of his left femur    This patient was examined direct consult with Dr. Jaswant Turner. Thank you for the courtesy of this consult.           Signed By: Esther Hayes PA-C     July 11, 2022

## 2022-07-11 NOTE — ACP (ADVANCE CARE PLANNING)
Advance Care Planning   Healthcare Decision Maker:       Primary Decision Maker: Zechariah Ocampo - [phone]

## 2022-07-11 NOTE — PROGRESS NOTES
SPEECH LANGUAGE PATHOLOGY BEDSIDE SWALLOW EVALUATION  Patient: Ophelia Garcia (20 y.o. male)  Date: 7/11/2022  Primary Diagnosis: Fracture, hip (HCC) [S72.009A]  Rhabdomyolysis [M62.82]        Precautions: aspiration      ASSESSMENT :  Based on the objective data described below, the patient presents with minimal oral and oropharyngeal dysphagia, however functional for age. Oral mucosa dry. Upper and lower dentures in place. Patient tolerate thin liquids via cup w/ minimal swallow delay and adequate HLE and protraction. X1 delayed cough response present after the swallow. No other clinical indicators of penetration or aspiration noted w/ thin. Mastication prolonged w/ reduced bolus formation, minimal oral spreading and anterior spillage. No pharyngeal difficulty noted. Patient reported consuming soft foods at baseline s/t his Arndt's esophagus and diverticulosis. Patient will benefit from skilled intervention to address the above impairments. Patients rehabilitation potential is considered to be Good     PLAN :  Recommendations and Planned Interventions:  Soft and bite size diet, thin liquids. Medications crushed. Assist w/ p.o. intake. Patient w/ hx of Parkinson's and has hand tremors. Aspiration precautions. Frequency/Duration: Patient will be followed by speech-language pathology 2 times a week to address goals. Discharge Recommendations: To Be Determined     SUBJECTIVE:   Patient alert and seen on stretcher in ED. Patient w/ GLF. Hip fx. OBJECTIVE:     CXR Results  (Last 48 hours)                 07/11/22 1102  XR CHEST PORT Final result    Impression:  No acute process. Narrative: Indication: Status post fall, right shoulder pain       Comparison: None       Portable exam of the chest obtained at 1102 demonstrates normal heart size. There is no acute process in the lung fields. The osseous structures are   unremarkable.                   CT Results  (Last 48 hours) 07/11/22 1319  CT HEAD WO CONT Final result    Impression:  No acute intracranial abnormality. There is a LEFT frontal approach  shunt   catheter in place with the tip to the LEFT of midline. Narrative:  EXAM: CT HEAD WO CONT       INDICATION: Fall       COMPARISON: CT head without contrast July 19, 2017. CONTRAST: None. TECHNIQUE: Unenhanced CT of the head was performed using 5 mm images. Brain and   bone windows were generated. Coronal and sagittal reformats. CT dose reduction   was achieved through use of a standardized protocol tailored for this   examination and automatic exposure control for dose modulation. FINDINGS:   The ventricles and sulci are normal in size, shape and configuration. . There is   a LEFT frontal approach shunt catheter in place with the tip to the LEFT of   midline. There is no intracranial hemorrhage, extra-axial collection, or mass   effect. The basilar cisterns are open. No CT evidence of acute infarct. The bone windows demonstrate no abnormalities. The visualized portions of the   paranasal sinuses and mastoid air cells are clear.           07/11/22 1319  CT SPINE CERV WO CONT Final result    Impression:      1. No evidence of acute fracture or subluxation of the cervical spine. 2. Moderate to severe degenerative disc disease at C5-C6 and C6-C7. There is   multilevel right-sided moderate to severe facet arthritis. Narrative:  EXAM:  CT CERVICAL SPINE WITHOUT CONTRAST       INDICATION: Fall. COMPARISON: None. CONTRAST:  None. TECHNIQUE: Multislice helical CT of the cervical spine was performed without   intravenous contrast administration. Sagittal and coronal reformats were   generated. CT dose reduction was achieved through use of a standardized   protocol tailored for this examination and automatic exposure control for dose   modulation. FINDINGS:       The alignment is within normal limits. There is no fracture or compression   deformity. The odontoid process is intact. The craniocervical junction is within   normal limits. Severe disc space narrowing and endplate degeneration are seen at   the C5-C6 and C6-C7 levels. There is bilateral, multilevel facet arthritis which   is worse on the RIGHT side. No bony canal narrowing is appreciated. The neck   soft tissues are grossly unremarkable. A left-sided  shunt catheter is noted. Emphysematous changes are seen at the lung apices. 07/11/22 1319  CT CHEST W CONT Final result    Impression:  1. Comminuted intertrochanteric left femur fracture. Subacute-appearing right   11th and 12th rib fractures. Several Chronic-appearing left rib fractures. 2.  Fat stranding/edema/contusion in the left retroperitoneum. Suspected   intramuscular hematomas within the left iliopsoas and gluteal compartments. 3.  Right bladder mass, concerning for neoplasm. Nonemergent cystoscopy   recommended for further evaluation. 4.  Left upper lobe partially calcified nodule, not seen on prior exams. Attention on follow-up. 5.  Additional nonacute findings as above. Narrative:  EXAM: CT CHEST W CONT, CT ABD PELV W CONT       INDICATION:    Chest: Ground-level fall; diffuse torso pain, poor historian; rule out traumatic   injury   Abdomen pelvis: Abdominal pain       COMPARISON: CT chest 10/27/2008, CT abdomen and pelvis 6/6/2011. TECHNIQUE: Helical CT of the chest, abdomen, and pelvis following the uneventful   intravenous administration of 100 mL Isovue-370. Oral contrast was utilized. Coronal and sagittal reformats were generated. CT dose reduction was achieved   through use of a standardized protocol tailored for this examination and   automatic exposure control for dose modulation. FINDINGS:       THYROID: No nodule. MEDIASTINUM: No mass or lymphadenopathy. ES: No mass or lymphadenopathy. THORACIC AORTA: No aneurysm. Atherosclerosis. MAIN PULMONARY ARTERY: Normal in caliber. HEART: Cardiomegaly. Coronary artery calcifications. ESOPHAGUS: Patulous with fluid and debris. TRACHEA/BRONCHI: Patent. PLEURA: No effusion or pneumothorax. LUNGS: Emphysema. No acute airspace disease. 6 mm centrally calcified left upper   lobe nodule, not seen on prior exams. .       Liver: No mass or biliary dilatation. Biliary tree: The gallbladder is within normal limits. The CBD is not dilated. Spleen: Calcified granulomas. Pancreas: No inflammation, mass or ductal dilatation. Adrenals: Unremarkable. Kidneys: No mass or hydronephrosis. A 0.7 cm x 6.4 cm bilobed left renal cyst   with a few thin partially calcified septations (Bosniak 2). Several additional   bilateral renal cysts and subcentimeter hypodensities are too small to   accurately characterize; no dedicated follow-up recommended   Stomach: Unremarkable. Small bowel: No dilatation or wall thickening. Colon: No dilatation or wall thickening. Diverticulosis. Appendix: Normal   Peritoneum: No ascites or pneumoperitoneum. Retroperitoneum: No lymphadenopathy or aortic aneurysm. Atherosclerosis. Fat   stranding/edema/contusion in the left retroperitoneum. Suspected intramuscular   hematomas within the left iliopsoas and gluteal compartments. Reproductive organs: Prostatic brachytherapy seeds. Urinary bladder: No mass or calculus. Bones: Several chronic-appearing left-sided rib fracture deformities. Subacute-chronic appearing posterior right 11-12 rib fractures. Comminuted   intertrochanteric left femur fracture. Degenerative changes. Grade 1   anterolisthesis of L4 on L5. 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 Abdominal wall: 2.2 cm x 1.2 cm right posterior eccentric mass (501-120). Additional comments: N/A           07/11/22 1319  CT ABD PELV W CONT Final result    Impression:  1. Comminuted intertrochanteric left femur fracture. Subacute-appearing right   11th and 12th rib fractures. Several Chronic-appearing left rib fractures. 2.  Fat stranding/edema/contusion in the left retroperitoneum. Suspected   intramuscular hematomas within the left iliopsoas and gluteal compartments. 3.  Right bladder mass, concerning for neoplasm. Nonemergent cystoscopy   recommended for further evaluation. 4.  Left upper lobe partially calcified nodule, not seen on prior exams. Attention on follow-up. 5.  Additional nonacute findings as above. Narrative:  EXAM: CT CHEST W CONT, CT ABD PELV W CONT       INDICATION:    Chest: Ground-level fall; diffuse torso pain, poor historian; rule out traumatic   injury   Abdomen pelvis: Abdominal pain       COMPARISON: CT chest 10/27/2008, CT abdomen and pelvis 6/6/2011. TECHNIQUE: Helical CT of the chest, abdomen, and pelvis following the uneventful   intravenous administration of 100 mL Isovue-370. Oral contrast was utilized. Coronal and sagittal reformats were generated. CT dose reduction was achieved   through use of a standardized protocol tailored for this examination and   automatic exposure control for dose modulation. FINDINGS:       THYROID: No nodule. MEDIASTINUM: No mass or lymphadenopathy. ES: No mass or lymphadenopathy. THORACIC AORTA: No aneurysm. Atherosclerosis. MAIN PULMONARY ARTERY: Normal in caliber. HEART: Cardiomegaly. Coronary artery calcifications. ESOPHAGUS: Patulous with fluid and debris. TRACHEA/BRONCHI: Patent. PLEURA: No effusion or pneumothorax. LUNGS: Emphysema. No acute airspace disease. 6 mm centrally calcified left upper   lobe nodule, not seen on prior exams. .       Liver: No mass or biliary dilatation. Biliary tree: The gallbladder is within normal limits. The CBD is not dilated. Spleen: Calcified granulomas. Pancreas: No inflammation, mass or ductal dilatation. Adrenals: Unremarkable. Kidneys: No mass or hydronephrosis. A 0.7 cm x 6.4 cm bilobed left renal cyst   with a few thin partially calcified septations (Bosniak 2). Several additional   bilateral renal cysts and subcentimeter hypodensities are too small to   accurately characterize; no dedicated follow-up recommended   Stomach: Unremarkable. Small bowel: No dilatation or wall thickening. Colon: No dilatation or wall thickening. Diverticulosis. Appendix: Normal   Peritoneum: No ascites or pneumoperitoneum. Retroperitoneum: No lymphadenopathy or aortic aneurysm. Atherosclerosis. Fat   stranding/edema/contusion in the left retroperitoneum. Suspected intramuscular   hematomas within the left iliopsoas and gluteal compartments. Reproductive organs: Prostatic brachytherapy seeds. Urinary bladder: No mass or calculus. Bones: Several chronic-appearing left-sided rib fracture deformities. Subacute-chronic appearing posterior right 11-12 rib fractures. Comminuted   intertrochanteric left femur fracture. Degenerative changes. Grade 1   anterolisthesis of L4 on L5. 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 Abdominal wall: 2.2 cm x 1.2 cm right posterior eccentric mass (501-120).    Additional comments: N/A                    Past Medical History:   Diagnosis Date    Arthritis     Asthma     Burning with urination     Cancer ([facility] [address]     Prostate Finished radiation 3/2012    COPD     Essential tremor     GERD (gastroesophageal reflux disease)     Hyperlipidemia     Hypertension     PAD (peripheral artery disease) ([facility])      Past Surgical History:   Procedure Laterality Date    HC STNT SFA VIBN WLGO -H1  12/12/2005    LEFT Superficial Femoral Arter Stent    HX ENDOSCOPY      HX GI      Bleeding ulcers    HX HEART CATHETERIZATION      HX HEENT      L eye    HX HEENT      Cataract    HX ORTHOPAEDIC      R & L Carpal tunnel    HX UROLOGICAL  05/11/2011    Prostate Bx    HX UROLOGICAL      Brachytherapy    HX UROLOGICAL  05/11/2011    cysto, BRP     Prior Level of Function/Home Situation:   Home Situation  Patient Expects to be Discharged to[de-identified] Home  Diet prior to admission: soft food, thin  Current Diet:  NPO    Cognitive and Communication Status:  Neurologic State: Alert  Orientation Level: Oriented to person  Cognition: Follows commands           Swallowing Evaluation:   Oral Assessment:  Oral Assessment  Labial: No impairment  Dentition: Upper & lower dentures  Oral Hygiene: Dry  Lingual: No impairment  Velum: No impairment  P.O. Trials:  Patient Position: Upright in bed   Vocal quality prior to P.O.: No impairment  Consistency Presented: Thin liquid; Solid;Puree  How Presented: SLP-fed/presented;Self-fed/presented;Cup/sip;Spoon     Bolus Acceptance: No impairment  Bolus Formation/Control: Impaired  Type of Impairment: Incomplete  Propulsion: Delayed (# of seconds)  Oral Residue: 10-50% of bolus  Initiation of Swallow: Delayed (# of seconds)  Laryngeal Elevation: Functional  Aspiration Signs/Symptoms: Delayed cough/throat clear  Pharyngeal Phase Characteristics: No impairment, issues, or problems   Effective Modifications: Small sips and bites          Oral Phase Severity: Minimal  Pharyngeal Phase Severity : Minimal  Voice:                 Vocal Quality: No impairment          Pain:  Pain Scale 1: Numeric (0 - 10)  Pain Intensity 1: 5  Pain Location 1: Generalized    After treatment:   Patient left in no apparent distress in bed, Call bell within reach, and Nursing notified    COMMUNICATION/EDUCATION:   Patient was educated regarding purpose of SLP assessment, POC, diet recs and sw safety precautions. Patient demonstrated [address] understanding as evidenced by verbal understanding. The patient's plan of care including recommendations, planned interventions, and recommended diet changes were discussed with: Registered nurse. Patient/family have participated as able in goal setting and plan of care.     Thank you for this referral.  FABIENNE Miller M.S. CCC-SLP  Time Calculation: 14 mins           Problem: Dysphagia (Adult)  Goal: *Acute Goals and Plan of Care (Insert Text)  Description: Speech Therapy Swallow Goals  Initiated 7/11/2022  -Patient will tolerate SBS diet with thin liquids without clinical indicators of aspiration given minimal cues within 5-7day(s). -Patient will tolerate PO trials without clinical indicators of aspiration given minimal cues within 5-7day(s). -Patient will participate in modified barium swallow study within 5-7 day(s). -Patient will demonstrate understanding of swallow safety precautions and aspiration precautions, diet recs with minimal cues within 5-7 day(s). -Patient/caregiver goal: \"to eat safely. \"          Outcome: Progressing Towards Goal

## 2022-07-11 NOTE — ED PROVIDER NOTES
EMERGENCY DEPARTMENT HISTORY AND PHYSICAL EXAM      Date: 7/11/2022  Patient Name: Leo Gurrola      History of Presenting Illness     Chief Complaint   Patient presents with    Fall       History Provided By: Patient and EMS    Location/Duration/Severity/Modifying factors   Patient is a 25-year-old male who has a past medical history of Arthritis, Asthma, Burning with urination, Cancer ([address] COPD, Essential tremor, GERD (gastroesophageal reflux disease), Hyperlipidemia, Hypertension, and PAD (peripheral artery disease) presenting after reported fall with prolonged downtime. Patient evidently lives alone however he has caregivers that come on a daily basis. It is reported that the caregiver for this week and did not show up all weekend. The family last saw the patient on a home camera on Saturday. Patient is a poor historian and history is limited due to dementia versus encephalopathy. The patient denies any pain except for right shoulder pain on palpation. Additional areas include both hips. He has several skin tears over the knees and the right hip. Patient evidently was found after EMS and police were contacted to do a welfare check and found the patient on the ground unable to ambulate. There is no reported blood thinner use. There are no other complaints, changes, or physical findings at this time.     PCP: Meliza Montgomery MD    Current Facility-Administered Medications   Medication Dose Route Frequency Provider Last Rate Last Admin    piperacillin-tazobactam (ZOSYN) 3.375 g in 0.9% sodium chloride (MBP/ADV) 100 mL MBP  3.375 g IntraVENous Q8H Maira VERA DO        [START ON 7/13/2022] levoFLOXacin (LEVAQUIN) 750 mg in D5W IVPB  750 mg IntraVENous Q48H Maira VERA DO        sodium chloride (NS) flush 5-40 mL  5-40 mL IntraVENous Q8H Ranjit Diaz MD   10 mL at 07/11/22 1643    sodium chloride (NS) flush 5-40 mL  5-40 mL IntraVENous PRN MD Darren Orr acetaminophen (TYLENOL) tablet 650 mg  650 mg Oral Q6H PRN Ashley Menon MD        Or    acetaminophen (TYLENOL) suppository 650 mg  650 mg Rectal Q6H PRN Ashley Menon MD        polyethylene glycol [facility]) packet 17 g  17 g Oral DAILY PRN Ashley Menon MD        ondansetron [facility] ODT) tablet 4 mg  4 mg Oral Q8H PRN Ashley Menon MD        Or    ondansetron [facility]) injection 4 mg  4 mg IntraVENous Q6H PRN Ashley Menon MD        0.9% sodium chloride infusion  100 mL/hr IntraVENous CONTINUOUS Ashley Menon  mL/hr at 07/11/22 1825 100 mL/hr at 07/11/22 1825    albuterol-ipratropium (DUO-NEB) 2.5 MG-0.5 MG/3 ML  3 mL Nebulization Q4H PRN Ashley Menon MD        celecoxib (CELEBREX) capsule 100 mg  100 mg Oral BID Chaz Gross PA-C   100 mg at 07/11/22 1823     Current Outpatient Medications   Medication Sig Dispense Refill    topiramate (TOPAMAX) 50 mg tablet Take  by mouth two (2) times a day.  ezetimibe (Zetia) 10 mg tablet Take  by mouth.  mometasone (NASONEX) 50 mcg/actuation nasal spray 2 Sprays daily.  candesartan (ATACAND) 8 mg tablet Take  by mouth daily.  psyllium (METAMUCIL SMOOTH TEXTURE) packet Take 1 Packet by mouth daily.  cetirizine (ZYRTEC) 10 mg tablet Take  by mouth daily.  gabapentin (NEURONTIN) 300 mg capsule Take 100 mg by mouth nightly.  omega-3 fatty acids-vitamin e (FISH OIL) 1,000 mg Cap Take 1 Cap by mouth.  fluticasone-salmeterol (ADVAIR DISKUS) 250-50 mcg/Dose diskus inhaler Take 1 Puff by inhalation every twelve (12) hours.  simvastatin (ZOCOR) 40 mg tablet Take  by mouth nightly.  alfuzosin (UROXATRAL) 10 mg SR tablet Take  by mouth daily.  montelukast (SINGULAIR) 10 mg tablet Take 10 mg by mouth daily.  esomeprazole (NEXIUM) 40 mg capsule Take  by mouth daily.  albuterol (PROVENTIL VENTOLIN) 2.5 mg /3 mL (0.083 %) nebulizer solution by Nebulization route once.       hydrocortisone (HYCORT) 1 % ointment Apply  to affected area two (2) times a day. use thin layer       aspirin delayed-release 81 mg tablet Take 81 mg by mouth daily.  Calcium Carbonate-Vit D3-Min (CALTRATE 600+D PLUS MINERALS) 600 mg (1,500 mg)-400 unit Chew Take  by mouth.  MV-Min-Folic Acid-Lutein (CENTRUM SILVER) 500-250 mcg Chew Take  by mouth. Past History     Past Medical History:  Past Medical History:   Diagnosis Date    Arthritis     Asthma     Burning with urination     Cancer [facility])     Prostate Finished radiation 3/2012    COPD     Essential tremor     GERD (gastroesophageal reflux disease)     Hyperlipidemia     Hypertension     PAD (peripheral artery disease) ([facility])        Past Surgical History:  Past Surgical History:   Procedure Laterality Date    HC STNT SFA VIBN WLGO -H1  12/12/2005    LEFT Superficial Femoral Arter Stent    HX ENDOSCOPY      HX GI      Bleeding ulcers    HX HEART CATHETERIZATION      HX HEENT      L eye    HX HEENT      Cataract    HX ORTHOPAEDIC      R & L Carpal tunnel    HX UROLOGICAL  05/11/2011    Prostate Bx    HX UROLOGICAL      Brachytherapy    HX UROLOGICAL  05/11/2011    cysto, BRP       Family History:  Family History   Problem Relation Age of Onset    Diabetes Mother     Diabetes Sister     Diabetes Brother        Social History:  Social History     Tobacco Use    Smoking status: Former Smoker    Smokeless tobacco: Never Used   Substance Use Topics    Alcohol use: Not Currently     Comment: twice a month    Drug use: No       Allergies: Allergies   Allergen Reactions    Nut - Unspecified Not Reported This Time         Review of Systems     Review of Systems   Unable to perform ROS: Mental status change       Physical Exam     Physical Exam  Constitutional:       General: He is not in acute distress. Appearance: Normal appearance. He is normal weight. He is ill-appearing. He is not toxic-appearing.       Comments: Patient elderly appearing. He is not in any overt distress. He is resting with a c-collar in place on the stretcher   HENT:      Head: Normocephalic and atraumatic. Right Ear: External ear normal.      Left Ear: External ear normal.      Nose: Nose normal. No congestion. Mouth/Throat:      Mouth: Mucous membranes are dry. Pharynx: Oropharynx is clear. No oropharyngeal exudate or posterior oropharyngeal erythema. Eyes:      Conjunctiva/sclera: Conjunctivae normal.      Pupils: Pupils are equal, round, and reactive to light. Comments: Pupils constricted 3 mm bilaterally symmetric and reactive   Neck:      Comments: C-collar in place, palpatory exam through the c-collar reveals no midline spinal tenderness  Cardiovascular:      Rate and Rhythm: Normal rate and regular rhythm. Pulses: Normal pulses. Heart sounds: Normal heart sounds. No murmur heard. No friction rub. Pulmonary:      Effort: Pulmonary effort is normal.      Breath sounds: Normal breath sounds. No wheezing, rhonchi or rales. Abdominal:      Palpations: Abdomen is soft. Tenderness: There is no abdominal tenderness. There is no guarding or rebound. Comments: Thin scaphoid abdomen   Musculoskeletal:      Cervical back: Normal range of motion and neck supple. Right lower leg: No edema. Left lower leg: No edema. Comments: To the upper extremities there is tenderness elicited to the right shoulder or over the proximal humerus. The left shoulder is nontender. There is no clavicular tenderness on both sides. He has no chest wall tenderness. He has pain elicited with logroll of both hips, poorly localized. He has skin tears/pressure injury to both knees, he also has probably stage II pressure injury to the right hip   Skin:     General: Skin is warm and dry. Capillary Refill: Capillary refill takes less than 2 seconds. Findings: No rash. Neurological:      General: No focal deficit present. Mental Status: He is alert. Motor: No weakness. Lab and Diagnostic Study Results     Labs -  Recent Results (from the past 24 hour(s))   EKG, 12 LEAD, INITIAL    Collection Time: 07/11/22 10:11 AM   Result Value Ref Range    Ventricular Rate 84 BPM    Atrial Rate 84 BPM    QRS Duration 150 ms    Q-T Interval 396 ms    QTC Calculation (Bezet) 467 ms    Calculated R Axis 77 degrees    Calculated T Axis 62 degrees    Diagnosis       Sinus Rhythm with pvc  Non-specific intra-ventricular conduction block  Inferior infarct , age undetermined  Cannot rule out Anterior infarct , age undetermined  Abnormal ECG  No previous ECGs available  Confirmed by Miol Salgado ([id number]) on 7/11/2022 4:00:55 PM     CBC WITH AUTOMATED DIFF    Collection Time: 07/11/22 10:30 AM   Result Value Ref Range    WBC 20.6 (H) 4.1 - 11.1 K/uL    RBC 3.94 (L) 4.10 - 5.70 M/uL    HGB 11.9 (L) 12.1 - 17.0 g/dL    HCT 37.0 36.6 - 50.3 %    MCV 93.9 80.0 - 99.0 FL    MCH 30.2 26.0 - 34.0 PG    MCHC 32.2 30.0 - 36.5 g/dL    RDW 14.8 (H) 11.5 - 14.5 %    PLATELET 718 010 - 926 K/uL    MPV 12.0 8.9 - 12.9 FL    NRBC 0.0 0.0  WBC    ABSOLUTE NRBC 0.00 0.00 - 0.01 K/uL    NEUTROPHILS 87 (H) 32 - 75 %    LYMPHOCYTES 3 (L) 12 - 49 %    MONOCYTES 10 5 - 13 %    EOSINOPHILS 0 0 - 7 %    BASOPHILS 0 0 - 1 %    IMMATURE GRANULOCYTES 0 0 - 0.5 %    ABS. NEUTROPHILS 17.9 (H) 1.8 - 8.0 K/UL    ABS. LYMPHOCYTES 0.6 (L) 0.8 - 3.5 K/UL    ABS. MONOCYTES 2.0 (H) 0.0 - 1.0 K/UL    ABS. EOSINOPHILS 0.0 0.0 - 0.4 K/UL    ABS. BASOPHILS 0.0 0.0 - 0.1 K/UL    ABS. IMM.  GRANS. 0.1 (H) 0.00 - 0.04 K/UL    DF AUTOMATED     METABOLIC PANEL, COMPREHENSIVE    Collection Time: 07/11/22 10:30 AM   Result Value Ref Range    Sodium 139 136 - 145 mmol/L    Potassium 5.1 3.5 - 5.1 mmol/L    Chloride 104 97 - 108 mmol/L    CO2 28 21 - 32 mmol/L    Anion gap 7 5 - 15 mmol/L    Glucose 135 (H) 65 - 100 mg/dL    BUN 43 (H) 6 - 20 mg/dL    Creatinine 1.10 0.70 - 1.30 mg/dL BUN/Creatinine ratio 39 (H) 12 - 20      GFR est AA >60 >60 ml/min/1.73m2    GFR est non-AA >60 >60 ml/min/1.73m2    Calcium 9.2 8.5 - 10.1 mg/dL    Bilirubin, total 0.9 0.2 - 1.0 mg/dL    AST (SGOT) 104 (H) 15 - 37 U/L    ALT (SGPT) 35 12 - 78 U/L    Alk.  phosphatase 66 45 - 117 U/L    Protein, total 7.7 6.4 - 8.2 g/dL    Albumin 3.3 (L) 3.5 - 5.0 g/dL    Globulin 4.4 (H) 2.0 - 4.0 g/dL    A-G Ratio 0.8 (L) 1.1 - 2.2     TROPONIN-HIGH SENSITIVITY    Collection Time: 07/11/22 10:30 AM   Result Value Ref Range    Troponin-High Sensitivity 43 0 - 76 ng/L   URINALYSIS W/ REFLEX CULTURE    Collection Time: 07/11/22 10:30 AM    Specimen: Urine   Result Value Ref Range    Color Yellow/Straw      Appearance Clear Clear      Specific gravity 1.019 1.003 - 1.030      pH (UA) 5.0 5.0 - 8.0      Protein Negative Negative mg/dL    Glucose Negative Negative mg/dL    Ketone 5 (A) Negative mg/dL    Bilirubin Negative Negative      Blood Negative Negative      Urobilinogen 0.1 0.1 - 1.0 EU/dL    Nitrites Negative Negative      Leukocyte Esterase Negative Negative      UA:UC IF INDICATED Culture not indicated by UA result Culture not indicated by UA result      WBC 0-4 0 - 4 /hpf    RBC 0-5 0 - 5 /hpf    Bacteria Negative Negative /hpf    Mucus Trace /lpf   LACTIC ACID    Collection Time: 07/11/22 10:30 AM   Result Value Ref Range    Lactic acid 2.2 (HH) 0.4 - 2.0 mmol/L   CULTURE, BLOOD, PAIRED    Collection Time: 07/11/22 10:30 AM    Specimen: Blood   Result Value Ref Range    Special Requests: No Special Requests      Culture result: No growth after 1 hour     CK    Collection Time: 07/11/22 10:30 AM   Result Value Ref Range    CK 2,251 (H) 39 - 308 U/L   MAGNESIUM    Collection Time: 07/11/22 11:00 AM   Result Value Ref Range    Magnesium 2.6 (H) 1.6 - 2.4 mg/dL   AMMONIA    Collection Time: 07/11/22 11:00 AM   Result Value Ref Range    Ammonia, plasma <10 <32 umol/L   PROTHROMBIN TIME + INR    Collection Time: 07/11/22 11:15 AM   Result Value Ref Range    Prothrombin time 19.2 (H) 11.9 - 14.6 sec    INR 1.6 (H) 0.9 - 1.1     PROCALCITONIN    Collection Time: 07/11/22 11:23 AM   Result Value Ref Range    Procalcitonin 0.24 (H) 0 ng/mL   TROPONIN-HIGH SENSITIVITY    Collection Time: 07/11/22 12:45 PM   Result Value Ref Range    Troponin-High Sensitivity 43 0 - 76 ng/L   LACTIC ACID    Collection Time: 07/11/22 12:50 PM   Result Value Ref Range    Lactic acid 1.5 0.4 - 2.0 mmol/L   TROPONIN-HIGH SENSITIVITY    Collection Time: 07/11/22  4:07 PM   Result Value Ref Range    Troponin-High Sensitivity 38 0 - 76 ng/L   CK    Collection Time: 07/11/22  4:07 PM   Result Value Ref Range    CK 1,679 (H) 39 - 308 U/L   COVID-19 RAPID TEST    Collection Time: 07/11/22  4:07 PM   Result Value Ref Range    COVID-19 rapid test Not Detected Not Detected     MRSA SCREEN - PCR (NASAL)    Collection Time: 07/11/22  4:07 PM   Result Value Ref Range    MRSA by PCR, Nasal Not Detected Not Detected           Radiologic Studies -   XR FEMUR LT 2 V   Final Result   Unchanged angulated intertrochanteric left femur fracture. CT HEAD WO CONT   Final Result   No acute intracranial abnormality. There is a LEFT frontal approach  shunt   catheter in place with the tip to the LEFT of midline. CT SPINE CERV WO CONT   Final Result      1. No evidence of acute fracture or subluxation of the cervical spine. 2. Moderate to severe degenerative disc disease at C5-C6 and C6-C7. There is   multilevel right-sided moderate to severe facet arthritis. CT CHEST W CONT   Final Result   1. Comminuted intertrochanteric left femur fracture. Subacute-appearing right   11th and 12th rib fractures. Several Chronic-appearing left rib fractures. 2.  Fat stranding/edema/contusion in the left retroperitoneum. Suspected   intramuscular hematomas within the left iliopsoas and gluteal compartments. 3.  Right bladder mass, concerning for neoplasm.  Nonemergent cystoscopy   recommended for further evaluation. 4.  Left upper lobe partially calcified nodule, not seen on prior exams. Attention on follow-up. 5.  Additional nonacute findings as above. CT ABD PELV W CONT   Final Result   1. Comminuted intertrochanteric left femur fracture. Subacute-appearing right   11th and 12th rib fractures. Several Chronic-appearing left rib fractures. 2.  Fat stranding/edema/contusion in the left retroperitoneum. Suspected   intramuscular hematomas within the left iliopsoas and gluteal compartments. 3.  Right bladder mass, concerning for neoplasm. Nonemergent cystoscopy   recommended for further evaluation. 4.  Left upper lobe partially calcified nodule, not seen on prior exams. Attention on follow-up. 5.  Additional nonacute findings as above. XR CHEST PORT   Final Result   No acute process. XR SHOULDER RT AP/LAT MIN 2 V   Final Result   No acute abnormality. XR HIP RT W OR WO PELV 2-3 VWS   Final Result   1. Comminuted intertrochanteric left hip fracture         XR HIP LT W OR WO PELV 2-3 VWS   Final Result   1. Comminuted intertrochanteric anterior left hip fracture         XR SCAPULA RT    (Results Pending)   XR ELBOW LT MIN 3 V    (Results Pending)         Medical Decision Making and ED Course   - I am the first and primary provider for this patient AND AM THE PRIMARY PROVIDER OF RECORD. - I reviewed the vital signs, available nursing notes, past medical history, past surgical history, family history and social history. - Initial assessment performed. The patients presenting problems have been discussed, and the staff are in agreement with the care plan formulated and outlined with them. I have encouraged them to ask questions as they arise throughout their visit. Vital Signs-Reviewed the patient's vital signs.     Patient Vitals for the past 12 hrs:   Temp Pulse Resp BP SpO2   07/11/22 1001 99.3 °F (37.4 °C) 89 14 136/72 100 %       EKG interpretation: See ED course for my interpretation of EKG(s). Records Reviewed: Nursing Notes, Old Medical Records and Previous Laboratory Studies        ED Course:       ED Course as of 07/11/22 1854   Mon Jul 11, 2022   1035 Last tetanus noted to be May 2018 [OSCAR]   1035 EKG interpretation of July 11, 2022, 1011. Very limited due to artifact apparent sinus rhythm rate of 84 bpm, the axis is normal prolonged QTC of 467. There are occasional PVCs present. There is no obvious ST segment deviation. Overall sinus rhythm with PVCs [OSCAR]   1123 XR HIP RT W OR WO PELV 2-3 VWS [OSCAR]   7654 Discussed with trauma surgery, Dr. Vince Russell. He agrees with plan for medical admission to the hospitalist service and he will follow along. [OSCAR]      ED Course User Index  [OSCAR] Dayday Burch DO         Provider Notes (Medical Decision Making):     MDM  Number of Diagnoses or Management Options  Closed fracture of multiple ribs of left side, initial encounter  Hematoma of left iliopsoas muscle, initial encounter  Intertrochanteric fracture of left hip, closed, initial encounter [facility])  Mass of urinary bladder  Traumatic rhabdomyolysis, initial encounter [facility])  Diagnosis management comments: 49-year-old male presenting after fall with prolonged downtime DDx: Intracranial hemorrhage, fracture, skull fracture, UTI, rhabdomyolysis, metabolic encephalopathy, acute kidney injury, sepsis, septic shock, severe sepsis, etc.    We will start with several laboratory studies, with a broad array of imaging including x-ray of the shoulder of both hips as well as CT of the brain through the pelvis. Patient not reportedly taking any blood thinners. He looks generally ill however not toxic. Is unclear what his baseline mentation is but he is to infuse today. Results reviewed and patient reassessed.   His work-up is reflective of left intertrochanteric fracture, he has also 2 rib fractures and there is stranding of the retroperitoneal space. I spoke to the hospitalist, orthopedic surgeon and trauma. Spoke to Dr. Deandra Guajardo. He agreed with hospitalist admission here at [facility]. Orthopedic surgery also consulted he requested that have the family reach out and so I spoke to the niece Marycarmen Ocampo who will contact Dr. Mary Anne Riggs. Notably, the patient's niece and Marycarmen Ocampo indicates that the patient has actually been receiving hospice level care. This is somewhat conflicting as when I mention the bladder mass to Marycarmen Ocampo, and whether or not that would be followed up on she indicated that she thinks that they do want to follow-up on that to evaluate this further. She also tells me that the patient is in the past that he wanted to be a full code. Overall findings also consistent with rhabdomyolysis, patient started on antibiotics given marked leukocytosis of unclear etiology for infection. He is got multiple skin tears/pressure injuries likely reflective of being on the ground for a prolonged period of time. Of note, I spoke to the daughter Marycarmen Ocampo and advised of the incidental findings of the bladder mass as well as the pulmonary nodule and recommended that upon discharge they talk to the patient's PCP about appropriate follow-up. Patient would likely need to see a urologist for cystoscopy regarding the bladder mass on a nonemergent basis     ------------------------------------------------------------------------------------------------------------        Consultations:       Consultations: -  Hospitalist Consultant: Dr. Aston English: We have asked for emergent assistance with regard to this patient. We have discussed the patients HPI, ROS, PE and results this far. They will come and evaluate the patient for admission. , - Orthopedic Surgery Consultant: Dr. Mary Anne Riggs: We have asked for emergent assistance with regard to this patient. We have discussed the patients HPI, ROS, PE and labd and imaging results this far.   They will come and evaluate the patient for their acute orthopedic surgical needs and further treatment with possible admission. and -  Dr Bermudez Blunt (Trauma)        Procedures and Critical Care       Performed by: Temitope Hassan DO    Procedures             CRITICAL CARE NOTE :  10:51 AM  CRITICAL CARE TIME: CRITICAL CARE NOTE:    I have spent 75 minutes of critical care time involved in lab review, consultations with specialist, family decision-making, and documentation. During this entire length of time I was immediately available to the patient. Critical Care: The reason for providing this level of medical care for this critically ill patient was due a critical illness that impaired one or more vital organ systems such that there was a high probability of imminent or life threatening deterioration in the patients condition. This care involved high complexity decision making to assess, manipulate, and support vital system functions, to treat this vital organ system failure and to prevent further life threatening deterioration of the patients condition. Time is exclusive of procedural and teaching time. DO Temitope Branch DO        Disposition         Diagnosis:   1. Intertrochanteric fracture of left hip, closed, initial encounter ([facility] [address]    2. Traumatic rhabdomyolysis, initial encounter ([facility] [address]    3. Hematoma of left iliopsoas muscle, initial encounter    4. Closed fracture of multiple ribs of left side, initial encounter    5. Mass of urinary bladder          Disposition: ADMIT    Follow-up Information    None         Patient's Medications   Start Taking    No medications on file   Continue Taking    ALBUTEROL (PROVENTIL VENTOLIN) 2.5 MG /3 ML (0.083 %) NEBULIZER SOLUTION    by Nebulization route once. ALFUZOSIN (UROXATRAL) 10 MG SR TABLET    Take  by mouth daily. ASPIRIN DELAYED-RELEASE 81 MG TABLET    Take 81 mg by mouth daily.     CALCIUM CARBONATE-VIT D3-MIN (CALTRATE 600+D PLUS MINERALS) 600 MG (1,500 MG)-400 UNIT CHEW    Take  by mouth. CANDESARTAN (ATACAND) 8 MG TABLET    Take  by mouth daily. CETIRIZINE (ZYRTEC) 10 MG TABLET    Take  by mouth daily. ESOMEPRAZOLE (NEXIUM) 40 MG CAPSULE    Take  by mouth daily. EZETIMIBE (ZETIA) 10 MG TABLET    Take  by mouth. FLUTICASONE-SALMETEROL (ADVAIR DISKUS) 250-50 MCG/DOSE DISKUS INHALER    Take 1 Puff by inhalation every twelve (12) hours. GABAPENTIN (NEURONTIN) 300 MG CAPSULE    Take 100 mg by mouth nightly. HYDROCORTISONE (HYCORT) 1 % OINTMENT    Apply  to affected area two (2) times a day. use thin layer     MOMETASONE (NASONEX) 50 MCG/ACTUATION NASAL SPRAY    2 Sprays daily. MONTELUKAST (SINGULAIR) 10 MG TABLET    Take 10 mg by mouth daily. MV-MIN-FOLIC ACID-LUTEIN (CENTRUM SILVER) 500-250 MCG CHEW    Take  by mouth. OMEGA-3 FATTY ACIDS-VITAMIN E (FISH OIL) 1,000 MG CAP    Take 1 Cap by mouth. PSYLLIUM (METAMUCIL SMOOTH TEXTURE) PACKET    Take 1 Packet by mouth daily. SIMVASTATIN (ZOCOR) 40 MG TABLET    Take  by mouth nightly. TOPIRAMATE (TOPAMAX) 50 MG TABLET    Take  by mouth two (2) times a day. These Medications have changed    No medications on file   Stop Taking    No medications on file         Diagnosis     Clinical Impression:   1. Intertrochanteric fracture of left hip, closed, initial encounter ([facility] [address]    2. Traumatic rhabdomyolysis, initial encounter ([facility] [address]    3. Hematoma of left iliopsoas muscle, initial encounter    4. Closed fracture of multiple ribs of left side, initial encounter    5. Mass of urinary bladder        Attestations:    Mirza Tom, DO    Please note that this dictation was completed with [employer], the computer voice recognition software. Quite often unanticipated grammatical, syntax, homophones, and other interpretive errors are inadvertently transcribed by the computer software. Please disregard these errors. Please excuse any errors that have escaped final proofreading.   Thank you.

## 2022-07-11 NOTE — H&P
GENERAL GENERIC H&P/CONSULT    Subjective:    89M with a past medical history of Arthritis, Asthma, Burning with urination, prostate cancer, COPD, Essential tremor, GERD (gastroesophageal reflux disease), Hyperlipidemia, Hypertension, and PAD (peripheral artery disease). He lives alone and has some occasional caregivers. 7/11/22 presents to hospital after being found down on the floor. Reportedly last seen at baseline two days prior. Patient is a poor historian and history is limited due to dementia versus encephalopathy. Patient reports falling and being on the floor for at least a day. During the ED course he reported right shoulder pain on palpation. Additional areas include both hips. He had several skin tears over the knees and the right hip. He underwent extensive trauma imaging and noted to have a hip fracture. Labs notable for markedly elevated CK. I have been asked to admit him to hospital for further stabilization and workup of his condition. Past Medical History:   Diagnosis Date    Arthritis     Asthma     Burning with urination     Cancer [facility])     Prostate Finished radiation 3/2012    COPD     Essential tremor     GERD (gastroesophageal reflux disease)     Hyperlipidemia     Hypertension     PAD (peripheral artery disease) ([facility])       Past Surgical History:   Procedure Laterality Date    HC STNT SFA VIBN WLGO -H1  12/12/2005    LEFT Superficial Femoral Arter Stent    HX ENDOSCOPY      HX GI      Bleeding ulcers    HX HEART CATHETERIZATION      HX HEENT      L eye    HX HEENT      Cataract    HX ORTHOPAEDIC      R & L Carpal tunnel    HX UROLOGICAL  05/11/2011    Prostate Bx    HX UROLOGICAL      Brachytherapy    HX UROLOGICAL  05/11/2011    cysto, BRP      Prior to Admission medications    Medication Sig Start Date End Date Taking? Authorizing Provider   topiramate (TOPAMAX) 50 mg tablet Take  by mouth two (2) times a day.     Provider, Historical   ezetimibe (Zetia) 10 mg tablet Take  by mouth. Provider, Historical   mometasone (NASONEX) 50 mcg/actuation nasal spray 2 Sprays daily. Provider, Historical   candesartan (ATACAND) 8 mg tablet Take  by mouth daily. Provider, Historical   psyllium (METAMUCIL SMOOTH TEXTURE) packet Take 1 Packet by mouth daily. Provider, Historical   cetirizine (ZYRTEC) 10 mg tablet Take  by mouth daily. Provider, Historical   gabapentin (NEURONTIN) 300 mg capsule Take 100 mg by mouth nightly. 6/8/11   Provider, Historical   omega-3 fatty acids-vitamin e (FISH OIL) 1,000 mg Cap Take 1 Cap by mouth. Provider, Historical   fluticasone-salmeterol (ADVAIR DISKUS) 250-50 mcg/Dose diskus inhaler Take 1 Puff by inhalation every twelve (12) hours. Provider, Historical   simvastatin (ZOCOR) 40 mg tablet Take  by mouth nightly. Provider, Historical   alfuzosin (UROXATRAL) 10 mg SR tablet Take  by mouth daily. Provider, Historical   montelukast (SINGULAIR) 10 mg tablet Take 10 mg by mouth daily. Provider, Historical   esomeprazole (NEXIUM) 40 mg capsule Take  by mouth daily. Provider, Historical   albuterol (PROVENTIL VENTOLIN) 2.5 mg /3 mL (0.083 %) nebulizer solution by Nebulization route once. Provider, Historical   hydrocortisone (HYCORT) 1 % ointment Apply  to affected area two (2) times a day. use thin layer     Provider, Historical   aspirin delayed-release 81 mg tablet Take 81 mg by mouth daily. Provider, Historical   Calcium Carbonate-Vit D3-Min (CALTRATE 600+D PLUS MINERALS) 600 mg (1,500 mg)-400 unit Chew Take  by mouth. Provider, Historical   MV-Min-Folic Acid-Lutein (CENTRUM SILVER) 500-250 mcg Chew Take  by mouth.     Provider, Historical     Allergies   Allergen Reactions    Nut - Unspecified Not Reported This Time      Social History     Tobacco Use    Smoking status: Former Smoker    Smokeless tobacco: Never Used   Substance Use Topics    Alcohol use: Not Currently     Comment: twice a month      Family History   Problem Relation Age of Onset    Diabetes Mother     Diabetes Sister     Diabetes Brother       Review of Systems   All other systems reviewed and are negative. Objective:    07/11 0701 - 07/11 1900  In: 1000 [I.V.:1000]  Out: -   No intake/output data recorded. Patient Vitals for the past 8 hrs:   BP Temp Pulse Resp SpO2 Height Weight   07/11/22 1001 136/72 99.3 °F (37.4 °C) 89 14 100 % 5' 10\" (1.778 m) 74.8 kg (165 lb)     Physical Exam  Vitals and nursing note reviewed. Constitutional:       General: He is not in acute distress. HENT:      Head: Normocephalic. Nose: Nose normal.      Mouth/Throat:      Mouth: Mucous membranes are dry. Eyes:      Extraocular Movements: Extraocular movements intact. Pupils: Pupils are equal, round, and reactive to light. Cardiovascular:      Rate and Rhythm: Normal rate and regular rhythm. Pulses: Normal pulses. Heart sounds: Normal heart sounds. Pulmonary:      Effort: Pulmonary effort is normal.      Breath sounds: Normal breath sounds. Abdominal:      General: Abdomen is flat. Palpations: Abdomen is soft. Musculoskeletal:      Cervical back: Normal range of motion and neck supple. Comments: To the upper extremities there is tenderness elicited to the right shoulder or over the proximal humerus. The left shoulder is nontender. There is no clavicular tenderness on both sides. He has no chest wall tenderness. He has pain elicited with logroll of both hips, poorly localized. He has skin tears/pressure injury to both knees, he also has probably stage II pressure injury to the right hip    Skin:     General: Skin is dry. Neurological:      Mental Status: He is alert. He is disoriented.    Psychiatric:         Mood and Affect: Mood normal.          Labs:    Recent Results (from the past 24 hour(s))   CBC WITH AUTOMATED DIFF    Collection Time: 07/11/22 10:30 AM   Result Value Ref Range    WBC 20.6 (H) 4.1 - 11.1 K/uL RBC 3.94 (L) 4.10 - 5.70 M/uL    HGB 11.9 (L) 12.1 - 17.0 g/dL    HCT 37.0 36.6 - 50.3 %    MCV 93.9 80.0 - 99.0 FL    MCH 30.2 26.0 - 34.0 PG    MCHC 32.2 30.0 - 36.5 g/dL    RDW 14.8 (H) 11.5 - 14.5 %    PLATELET 443 408 - 767 K/uL    MPV 12.0 8.9 - 12.9 FL    NRBC 0.0 0.0  WBC    ABSOLUTE NRBC 0.00 0.00 - 0.01 K/uL    NEUTROPHILS 87 (H) 32 - 75 %    LYMPHOCYTES 3 (L) 12 - 49 %    MONOCYTES 10 5 - 13 %    EOSINOPHILS 0 0 - 7 %    BASOPHILS 0 0 - 1 %    IMMATURE GRANULOCYTES 0 0 - 0.5 %    ABS. NEUTROPHILS 17.9 (H) 1.8 - 8.0 K/UL    ABS. LYMPHOCYTES 0.6 (L) 0.8 - 3.5 K/UL    ABS. MONOCYTES 2.0 (H) 0.0 - 1.0 K/UL    ABS. EOSINOPHILS 0.0 0.0 - 0.4 K/UL    ABS. BASOPHILS 0.0 0.0 - 0.1 K/UL    ABS. IMM. GRANS. 0.1 (H) 0.00 - 0.04 K/UL    DF AUTOMATED     METABOLIC PANEL, COMPREHENSIVE    Collection Time: 07/11/22 10:30 AM   Result Value Ref Range    Sodium 139 136 - 145 mmol/L    Potassium 5.1 3.5 - 5.1 mmol/L    Chloride 104 97 - 108 mmol/L    CO2 28 21 - 32 mmol/L    Anion gap 7 5 - 15 mmol/L    Glucose 135 (H) 65 - 100 mg/dL    BUN 43 (H) 6 - 20 mg/dL    Creatinine 1.10 0.70 - 1.30 mg/dL    BUN/Creatinine ratio 39 (H) 12 - 20      GFR est AA >60 >60 ml/min/1.73m2    GFR est non-AA >60 >60 ml/min/1.73m2    Calcium 9.2 8.5 - 10.1 mg/dL    Bilirubin, total 0.9 0.2 - 1.0 mg/dL    AST (SGOT) 104 (H) 15 - 37 U/L    ALT (SGPT) 35 12 - 78 U/L    Alk.  phosphatase 66 45 - 117 U/L    Protein, total 7.7 6.4 - 8.2 g/dL    Albumin 3.3 (L) 3.5 - 5.0 g/dL    Globulin 4.4 (H) 2.0 - 4.0 g/dL    A-G Ratio 0.8 (L) 1.1 - 2.2     TROPONIN-HIGH SENSITIVITY    Collection Time: 07/11/22 10:30 AM   Result Value Ref Range    Troponin-High Sensitivity 43 0 - 76 ng/L   URINALYSIS W/ REFLEX CULTURE    Collection Time: 07/11/22 10:30 AM    Specimen: Urine   Result Value Ref Range    Color Yellow/Straw      Appearance Clear Clear      Specific gravity 1.019 1.003 - 1.030      pH (UA) 5.0 5.0 - 8.0      Protein Negative Negative mg/dL Glucose Negative Negative mg/dL    Ketone 5 (A) Negative mg/dL    Bilirubin Negative Negative      Blood Negative Negative      Urobilinogen 0.1 0.1 - 1.0 EU/dL    Nitrites Negative Negative      Leukocyte Esterase Negative Negative      UA:UC IF INDICATED Culture not indicated by UA result Culture not indicated by UA result      WBC 0-4 0 - 4 /hpf    RBC 0-5 0 - 5 /hpf    Bacteria Negative Negative /hpf    Mucus Trace /lpf   LACTIC ACID    Collection Time: 07/11/22 10:30 AM   Result Value Ref Range    Lactic acid 2.2 (HH) 0.4 - 2.0 mmol/L   CK    Collection Time: 07/11/22 10:30 AM   Result Value Ref Range    CK 2,251 (H) 39 - 308 U/L   MAGNESIUM    Collection Time: 07/11/22 11:00 AM   Result Value Ref Range    Magnesium 2.6 (H) 1.6 - 2.4 mg/dL   AMMONIA    Collection Time: 07/11/22 11:00 AM   Result Value Ref Range    Ammonia, plasma <10 <32 umol/L   PROTHROMBIN TIME + INR    Collection Time: 07/11/22 11:15 AM   Result Value Ref Range    Prothrombin time 19.2 (H) 11.9 - 14.6 sec    INR 1.6 (H) 0.9 - 1.1     PROCALCITONIN    Collection Time: 07/11/22 11:23 AM   Result Value Ref Range    Procalcitonin 0.24 (H) 0 ng/mL   TROPONIN-HIGH SENSITIVITY    Collection Time: 07/11/22 12:45 PM   Result Value Ref Range    Troponin-High Sensitivity 43 0 - 76 ng/L   LACTIC ACID    Collection Time: 07/11/22 12:50 PM   Result Value Ref Range    Lactic acid 1.5 0.4 - 2.0 mmol/L       Chest X-Ray:  Indication: Status post fall, right shoulder pain     Comparison: None     Portable exam of the chest obtained at 1102 demonstrates normal heart size. There is no acute process in the lung fields. The osseous structures are  unremarkable.     IMPRESSION  No acute process. CT Results  (Last 48 hours)               07/11/22 1319  CT HEAD WO CONT Final result    Impression:  No acute intracranial abnormality. There is a LEFT frontal approach  shunt   catheter in place with the tip to the LEFT of midline.                Narrative:  EXAM: CT HEAD WO CONT       INDICATION: Fall       COMPARISON: CT head without contrast July 19, 2017. CONTRAST: None. TECHNIQUE: Unenhanced CT of the head was performed using 5 mm images. Brain and   bone windows were generated. Coronal and sagittal reformats. CT dose reduction   was achieved through use of a standardized protocol tailored for this   examination and automatic exposure control for dose modulation. FINDINGS:   The ventricles and sulci are normal in size, shape and configuration. . There is   a LEFT frontal approach shunt catheter in place with the tip to the LEFT of   midline. There is no intracranial hemorrhage, extra-axial collection, or mass   effect. The basilar cisterns are open. No CT evidence of acute infarct. The bone windows demonstrate no abnormalities. The visualized portions of the   paranasal sinuses and mastoid air cells are clear.           07/11/22 1319  CT SPINE CERV WO CONT Final result    Impression:      1. No evidence of acute fracture or subluxation of the cervical spine. 2. Moderate to severe degenerative disc disease at C5-C6 and C6-C7. There is   multilevel right-sided moderate to severe facet arthritis. Narrative:  EXAM:  CT CERVICAL SPINE WITHOUT CONTRAST       INDICATION: Fall. COMPARISON: None. CONTRAST:  None. TECHNIQUE: Multislice helical CT of the cervical spine was performed without   intravenous contrast administration. Sagittal and coronal reformats were   generated. CT dose reduction was achieved through use of a standardized   protocol tailored for this examination and automatic exposure control for dose   modulation. FINDINGS:       The alignment is within normal limits. There is no fracture or compression   deformity. The odontoid process is intact. The craniocervical junction is within   normal limits. Severe disc space narrowing and endplate degeneration are seen at   the C5-C6 and C6-C7 levels.  There is bilateral, multilevel facet arthritis which   is worse on the RIGHT side. No bony canal narrowing is appreciated. The neck   soft tissues are grossly unremarkable. A left-sided  shunt catheter is noted. Emphysematous changes are seen at the lung apices. 07/11/22 1319  CT CHEST W CONT Final result    Impression:  1. Comminuted intertrochanteric left femur fracture. Subacute-appearing right   11th and 12th rib fractures. Several Chronic-appearing left rib fractures. 2.  Fat stranding/edema/contusion in the left retroperitoneum. Suspected   intramuscular hematomas within the left iliopsoas and gluteal compartments. 3.  Right bladder mass, concerning for neoplasm. Nonemergent cystoscopy   recommended for further evaluation. 4.  Left upper lobe partially calcified nodule, not seen on prior exams. Attention on follow-up. 5.  Additional nonacute findings as above. Narrative:  EXAM: CT CHEST W CONT, CT ABD PELV W CONT       INDICATION:    Chest: Ground-level fall; diffuse torso pain, poor historian; rule out traumatic   injury   Abdomen pelvis: Abdominal pain       COMPARISON: CT chest 10/27/2008, CT abdomen and pelvis 6/6/2011. TECHNIQUE: Helical CT of the chest, abdomen, and pelvis following the uneventful   intravenous administration of 100 mL Isovue-370. Oral contrast was utilized. Coronal and sagittal reformats were generated. CT dose reduction was achieved   through use of a standardized protocol tailored for this examination and   automatic exposure control for dose modulation. FINDINGS:       THYROID: No nodule. MEDIASTINUM: No mass or lymphadenopathy. ES: No mass or lymphadenopathy. THORACIC AORTA: No aneurysm. Atherosclerosis. MAIN PULMONARY ARTERY: Normal in caliber. HEART: Cardiomegaly. Coronary artery calcifications. ESOPHAGUS: Patulous with fluid and debris. TRACHEA/BRONCHI: Patent. PLEURA: No effusion or pneumothorax.    LUNGS: Emphysema. No acute airspace disease. 6 mm centrally calcified left upper   lobe nodule, not seen on prior exams. .       Liver: No mass or biliary dilatation. Biliary tree: The gallbladder is within normal limits. The CBD is not dilated. Spleen: Calcified granulomas. Pancreas: No inflammation, mass or ductal dilatation. Adrenals: Unremarkable. Kidneys: No mass or hydronephrosis. A 0.7 cm x 6.4 cm bilobed left renal cyst   with a few thin partially calcified septations (Bosniak 2). Several additional   bilateral renal cysts and subcentimeter hypodensities are too small to   accurately characterize; no dedicated follow-up recommended   Stomach: Unremarkable. Small bowel: No dilatation or wall thickening. Colon: No dilatation or wall thickening. Diverticulosis. Appendix: Normal   Peritoneum: No ascites or pneumoperitoneum. Retroperitoneum: No lymphadenopathy or aortic aneurysm. Atherosclerosis. Fat   stranding/edema/contusion in the left retroperitoneum. Suspected intramuscular   hematomas within the left iliopsoas and gluteal compartments. Reproductive organs: Prostatic brachytherapy seeds. Urinary bladder: No mass or calculus. Bones: Several chronic-appearing left-sided rib fracture deformities. Subacute-chronic appearing posterior right 11-12 rib fractures. Comminuted   intertrochanteric left femur fracture. Degenerative changes. Grade 1   anterolisthesis of L4 on L5. 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 Abdominal wall: 2.2 cm x 1.2 cm right posterior eccentric mass (501-120). Additional comments: N/A           07/11/22 1319  CT ABD PELV W CONT Final result    Impression:  1. Comminuted intertrochanteric left femur fracture. Subacute-appearing right   11th and 12th rib fractures. Several Chronic-appearing left rib fractures. 2.  Fat stranding/edema/contusion in the left retroperitoneum.  Suspected   intramuscular hematomas within the left iliopsoas and gluteal compartments. 3.  Right bladder mass, concerning for neoplasm. Nonemergent cystoscopy   recommended for further evaluation. 4.  Left upper lobe partially calcified nodule, not seen on prior exams. Attention on follow-up. 5.  Additional nonacute findings as above. Narrative:  EXAM: CT CHEST W CONT, CT ABD PELV W CONT       INDICATION:    Chest: Ground-level fall; diffuse torso pain, poor historian; rule out traumatic   injury   Abdomen pelvis: Abdominal pain       COMPARISON: CT chest 10/27/2008, CT abdomen and pelvis 6/6/2011. TECHNIQUE: Helical CT of the chest, abdomen, and pelvis following the uneventful   intravenous administration of 100 mL Isovue-370. Oral contrast was utilized. Coronal and sagittal reformats were generated. CT dose reduction was achieved   through use of a standardized protocol tailored for this examination and   automatic exposure control for dose modulation. FINDINGS:       THYROID: No nodule. MEDIASTINUM: No mass or lymphadenopathy. ES: No mass or lymphadenopathy. THORACIC AORTA: No aneurysm. Atherosclerosis. MAIN PULMONARY ARTERY: Normal in caliber. HEART: Cardiomegaly. Coronary artery calcifications. ESOPHAGUS: Patulous with fluid and debris. TRACHEA/BRONCHI: Patent. PLEURA: No effusion or pneumothorax. LUNGS: Emphysema. No acute airspace disease. 6 mm centrally calcified left upper   lobe nodule, not seen on prior exams. .       Liver: No mass or biliary dilatation. Biliary tree: The gallbladder is within normal limits. The CBD is not dilated. Spleen: Calcified granulomas. Pancreas: No inflammation, mass or ductal dilatation. Adrenals: Unremarkable. Kidneys: No mass or hydronephrosis. A 0.7 cm x 6.4 cm bilobed left renal cyst   with a few thin partially calcified septations (Bosniak 2).  Several additional   bilateral renal cysts and subcentimeter hypodensities are too small to   accurately characterize; no dedicated follow-up recommended   Stomach: Unremarkable. Small bowel: No dilatation or wall thickening. Colon: No dilatation or wall thickening. Diverticulosis. Appendix: Normal   Peritoneum: No ascites or pneumoperitoneum. Retroperitoneum: No lymphadenopathy or aortic aneurysm. Atherosclerosis. Fat   stranding/edema/contusion in the left retroperitoneum. Suspected intramuscular   hematomas within the left iliopsoas and gluteal compartments. Reproductive organs: Prostatic brachytherapy seeds. Urinary bladder: No mass or calculus. Bones: Several chronic-appearing left-sided rib fracture deformities. Subacute-chronic appearing posterior right 11-12 rib fractures. Comminuted   intertrochanteric left femur fracture. Degenerative changes. Grade 1   anterolisthesis of L4 on L5. 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 Abdominal wall: 2.2 cm x 1.2 cm right posterior eccentric mass (501-120). Additional comments: N/A                 Assessment:  Active Problems:    Rhabdomyolysis (7/11/2022)      Fracture, hip ([address] (7/11/2022)    89M with a past medical history of Arthritis, Asthma, Burning with urination, prostate cancer, COPD, Essential tremor, GERD (gastroesophageal reflux disease), Hyperlipidemia, Hypertension, and PAD (peripheral artery disease). He lives alone and has some occasional caregivers. 7/11/22 presents to hospital after being found down on the floor. Reportedly last seen at baseline two days prior. Patient is a poor historian and history is limited due to dementia versus encephalopathy. Patient reports falling and being on the floor for at least a day. During the ED course he reported right shoulder pain on palpation. Additional areas include both hips. He had several skin tears over the knees and the right hip. He underwent extensive trauma imaging and noted to have a hip fracture. Labs notable for markedly elevated CK.  I have been asked to admit him to hospital for further stabilization and workup of his condition. Plan:    1) Ground level fall at home with left hip fracture and right 11-12 rib fractures. Admit to hospital   Supportive care   Orthopedics on board for anticipated operative management   Zosyn and levofloxacin started during the ED course    2) Cardiovascular issues including PAD, hypertension. Telemetry monitoring   Echocardiogram    3) Rhabdomyolysis in the setting of fall, further complicated by dehydration. Supportive care   Intravenous fluids   Trend CK    4) COPD.      Bronchodilators as needed      Signed:  Haley Ely MD 7/11/2022

## 2022-07-11 NOTE — PROGRESS NOTES
7/11/22 CM attempted interview - CM asked by visitor to com back later d/t pt talking face time on the phone.

## 2022-07-11 NOTE — CONSULTS
[id number] [facility] SURGERY CONSULT          Chief Complaint: _none    History of Present Illness:    Mr. Nicole Peck is a 80y.o. year old * male presents to ER after a ground level fall on Saturday,  followed by loss of consciousness and was brought here on Monday. No fever or chills, nausea or vomiting. * . Past Medical History:   Past Medical History:   Diagnosis Date    Arthritis     Asthma     Burning with urination     Cancer [facility])     Prostate Finished radiation 3/2012    COPD     Essential tremor     GERD (gastroesophageal reflux disease)     Hyperlipidemia     Hypertension     PAD [facility]peripheral artery disease) ([facility])        Past Surgical History:   Past Surgical History:   Procedure Laterality Date    HC STNT SFA VIBN WLGO -H1  12/12/2005    LEFT Superficial Femoral Arter Stent    HX ENDOSCOPY      HX GI      Bleeding ulcers    HX HEART CATHETERIZATION      HX HEENT      L eye    HX HEENT      Cataract    HX ORTHOPAEDIC      R & L Carpal tunnel    HX UROLOGICAL  05/11/2011    Prostate Bx    HX UROLOGICAL      Brachytherapy    HX UROLOGICAL  05/11/2011    cysto, BRP        Allergy:  Allergies   Allergen Reactions    Nut - Unspecified Not Reported This Time       Social History:  reports that he has quit smoking. He has never used smokeless tobacco. He reports previous alcohol use. He reports that he does not use drugs.      Family History:  Family History   Problem Relation Age of Onset    Diabetes Mother     Diabetes Sister     Diabetes Brother         Current Medications:  Current Facility-Administered Medications:     [COMPLETED] sodium chloride 0.9 % bolus infusion 1,000 mL, 1,000 mL, IntraVENous, ONCE, Last Rate: 1,500 mL/hr at 07/11/22 1618, 1,000 mL at 07/11/22 1618 **FOLLOWED BY** sodium chloride 0.9 % bolus infusion 500 mL, 500 mL, IntraVENous, ONCE, Odin He DO    [COMPLETED] piperacillin-tazobactam (ZOSYN) 4.5 g in 0.9% sodium chloride (MBP/ADV) 100 mL MBP, 4.5 g, IntraVENous, ONCE, Last Rate: 200 mL/hr at 07/11/22 1202, 4.5 g at 07/11/22 1202 **FOLLOWED BY** piperacillin-tazobactam (ZOSYN) 3.375 g in 0.9% sodium chloride (MBP/ADV) 100 mL MBP, 3.375 g, IntraVENous, Q8H, Odin He DO    [START ON 7/13/2022] levoFLOXacin (LEVAQUIN) 750 mg in D5W IVPB, 750 mg, IntraVENous, Q48H, Odin Flores DO    levoFLOXacin (LEVAQUIN) 750 mg in D5W IVPB, 750 mg, IntraVENous, ONCE, Steve Park DO, Last Rate: 100 mL/hr at 07/11/22 1624, 750 mg at 07/11/22 1624    sodium chloride (NS) flush 5-40 mL, 5-40 mL, IntraVENous, Q8H, Taisha Restrepo MD, 10 mL at 07/11/22 1643    sodium chloride (NS) flush 5-40 mL, 5-40 mL, IntraVENous, PRN, Dany Restrepo MD    acetaminophen (TYLENOL) tablet 650 mg, 650 mg, Oral, Q6H PRN **OR** acetaminophen (TYLENOL) suppository 650 mg, 650 mg, Rectal, Q6H PRN, Dany Restrepo MD    polyethylene glycol [facility]) packet 17 g, 17 g, Oral, DAILY PRN, Dany Restrepo MD    ondansetron (ZOFRAN ODT) tablet 4 mg, 4 mg, Oral, Q8H PRN **OR** ondansetron (ZOFRAN) injection 4 mg, 4 mg, IntraVENous, Q6H PRN, Ryley Gonzalez MD    0.9% sodium chloride infusion, 100 mL/hr, IntraVENous, CONTINUOUS, Dany Restrepo MD    albuterol-ipratropium (DUO-NEB) 2.5 MG-0.5 MG/3 ML, 3 mL, Nebulization, Q4H PRN, Dany Restrepo MD    celecoxib (CELEBREX) capsule 100 mg, 100 mg, Oral, BID, Chaz Gross PA-C    Current Outpatient Medications:     topiramate (TOPAMAX) 50 mg tablet, Take  by mouth two (2) times a day., Disp: , Rfl:     ezetimibe (Zetia) 10 mg tablet, Take  by mouth., Disp: , Rfl:     mometasone (NASONEX) 50 mcg/actuation nasal spray, 2 Sprays daily. , Disp: , Rfl:     candesartan (ATACAND) 8 mg tablet, Take  by mouth daily. , Disp: , Rfl:     psyllium (METAMUCIL SMOOTH TEXTURE) packet, Take 1 Packet by mouth daily. , Disp: , Rfl:     cetirizine (ZYRTEC) 10 mg tablet, Take  by mouth daily. , Disp: , Rfl:     gabapentin (NEURONTIN) 300 mg capsule, Take 100 mg by mouth nightly., Disp: , Rfl:     omega-3 fatty acids-vitamin e (FISH OIL) 1,000 mg Cap, Take 1 Cap by mouth., Disp: , Rfl:     fluticasone-salmeterol (ADVAIR DISKUS) 250-50 mcg/Dose diskus inhaler, Take 1 Puff by inhalation every twelve (12) hours. , Disp: , Rfl:     simvastatin (ZOCOR) 40 mg tablet, Take  by mouth nightly., Disp: , Rfl:     alfuzosin (UROXATRAL) 10 mg SR tablet, Take  by mouth daily. , Disp: , Rfl:     montelukast (SINGULAIR) 10 mg tablet, Take 10 mg by mouth daily. , Disp: , Rfl:     esomeprazole (NEXIUM) 40 mg capsule, Take  by mouth daily. , Disp: , Rfl:     albuterol (PROVENTIL VENTOLIN) 2.5 mg /3 mL (0.083 %) nebulizer solution, by Nebulization route once., Disp: , Rfl:     hydrocortisone (HYCORT) 1 % ointment, Apply  to affected area two (2) times a day. use thin layer , Disp: , Rfl:     aspirin delayed-release 81 mg tablet, Take 81 mg by mouth daily. , Disp: , Rfl:     Calcium Carbonate-Vit D3-Min (CALTRATE 600+D PLUS MINERALS) 600 mg (1,500 mg)-400 unit Chew, Take  by mouth., Disp: , Rfl:     MV-Min-Folic Acid-Lutein (CENTRUM SILVER) 500-250 mcg Chew, Take  by mouth., Disp: , Rfl:      Immunization History:   Most Recent Immunizations   Administered Date(s) Administered    Influenza Vaccine 09/21/2015      Complete    Review of Systems: Unable  To complete    Physical Exam:     Vitals & Measurements:     Wt Readings from Last 3 Encounters:   07/11/22 74.8 kg (165 lb)   08/14/20 75.3 kg (166 lb)   09/26/19 75.3 kg (166 lb)     Temp Readings from Last 3 Encounters:   07/11/22 99.3 °F (37.4 °C)   08/14/20 98.1 °F (36.7 °C) (Oral)   07/16/20 97.7 °F (36.5 °C) (Oral)     BP Readings from Last 3 Encounters:   07/11/22 136/72   08/14/20 127/79   07/16/20 144/85     Pulse Readings from Last 3 Encounters:   07/11/22 89   06/06/12 80   12/07/11 78      Ht Readings from Last 3 Encounters:   07/11/22 5' 10\" (1.778 m)   08/14/20 5' 5\" (1.651 m)   07/16/20 5' 9\" (1.753 m)          General: well appearing, no acute distress  Head: Normal  Face: Nornal  HEENT: atraumatic, PERRLA, moist mucosa, normal pharynx, normal tonsils and adenoids, normal tongue, no fluid in sinuses  Neck: Trachea midline, no carotid bruit, no masses  Chest: Normal.  Respiratory: Normal chest wall expansion, CTA B, no r/r/w, no rubs  Cardiovascular: RRR, no m/r/g, Normal S1 and S2  Abdomen: Soft, non tender, non-distended, normal bowel sounds in all quadrants, no hepatosplenomegaly, no tympany. Incision scar:   Genitourinary: No inguinal hernia, normal external gentalia, Testis & scrotum normal, no renal angle tenderness  Rectal: deferred  Musculoskeletal: normal ROM in upper and lower extremities, No joint swelling.   Integumentary: Warm, dry, and pink, with no rash, purpura, or petechia  Heme/Lymph: No lymphadenopathy, no bruises  Neurological:Cranial Nerves II-XII grossly intact, no gross motor or sensory deficit  Psychiatric: Cooperative with normal mood, affect, and cognition      Laboratory Values:   Recent Results (from the past 24 hour(s))   EKG, 12 LEAD, INITIAL    Collection Time: 07/11/22 10:11 AM   Result Value Ref Range    Ventricular Rate 84 BPM    Atrial Rate 84 BPM    QRS Duration 150 ms    Q-T Interval 396 ms    QTC Calculation (Bezet) 467 ms    Calculated R Axis 77 degrees    Calculated T Axis 62 degrees    Diagnosis       Sinus Rhythm with pvc  Non-specific intra-ventricular conduction block  Inferior infarct , age undetermined  Cannot rule out Anterior infarct , age undetermined  Abnormal ECG  No previous ECGs available  Confirmed by Milo Salgado ([id number]) on 7/11/2022 4:00:55 PM     CBC WITH AUTOMATED DIFF    Collection Time: 07/11/22 10:30 AM   Result Value Ref Range    WBC 20.6 (H) 4.1 - 11.1 K/uL    RBC 3.94 (L) 4.10 - 5.70 M/uL    HGB 11.9 (L) 12.1 - 17.0 g/dL    HCT 37.0 36.6 - 50.3 %    MCV 93.9 80.0 - 99.0 FL    MCH 30.2 26.0 - 34.0 PG    MCHC 32.2 30.0 - 36.5 g/dL    RDW 14.8 (H) 11.5 - 14.5 %    PLATELET 175 150 - 400 K/uL    MPV 12.0 8.9 - 12.9 FL    NRBC 0.0 0.0  WBC    ABSOLUTE NRBC 0.00 0.00 - 0.01 K/uL    NEUTROPHILS 87 (H) 32 - 75 %    LYMPHOCYTES 3 (L) 12 - 49 %    MONOCYTES 10 5 - 13 %    EOSINOPHILS 0 0 - 7 %    BASOPHILS 0 0 - 1 %    IMMATURE GRANULOCYTES 0 0 - 0.5 %    ABS. NEUTROPHILS 17.9 (H) 1.8 - 8.0 K/UL    ABS. LYMPHOCYTES 0.6 (L) 0.8 - 3.5 K/UL    ABS. MONOCYTES 2.0 (H) 0.0 - 1.0 K/UL    ABS. EOSINOPHILS 0.0 0.0 - 0.4 K/UL    ABS. BASOPHILS 0.0 0.0 - 0.1 K/UL    ABS. IMM. GRANS. 0.1 (H) 0.00 - 0.04 K/UL    DF AUTOMATED     METABOLIC PANEL, COMPREHENSIVE    Collection Time: 07/11/22 10:30 AM   Result Value Ref Range    Sodium 139 136 - 145 mmol/L    Potassium 5.1 3.5 - 5.1 mmol/L    Chloride 104 97 - 108 mmol/L    CO2 28 21 - 32 mmol/L    Anion gap 7 5 - 15 mmol/L    Glucose 135 (H) 65 - 100 mg/dL    BUN 43 (H) 6 - 20 mg/dL    Creatinine 1.10 0.70 - 1.30 mg/dL    BUN/Creatinine ratio 39 (H) 12 - 20      GFR est AA >60 >60 ml/min/1.73m2    GFR est non-AA >60 >60 ml/min/1.73m2    Calcium 9.2 8.5 - 10.1 mg/dL    Bilirubin, total 0.9 0.2 - 1.0 mg/dL    AST (SGOT) 104 (H) 15 - 37 U/L    ALT (SGPT) 35 12 - 78 U/L    Alk.  phosphatase 66 45 - 117 U/L    Protein, total 7.7 6.4 - 8.2 g/dL    Albumin 3.3 (L) 3.5 - 5.0 g/dL    Globulin 4.4 (H) 2.0 - 4.0 g/dL    A-G Ratio 0.8 (L) 1.1 - 2.2     TROPONIN-HIGH SENSITIVITY    Collection Time: 07/11/22 10:30 AM   Result Value Ref Range    Troponin-High Sensitivity 43 0 - 76 ng/L   URINALYSIS W/ REFLEX CULTURE    Collection Time: 07/11/22 10:30 AM    Specimen: Urine   Result Value Ref Range    Color Yellow/Straw      Appearance Clear Clear      Specific gravity 1.019 1.003 - 1.030      pH (UA) 5.0 5.0 - 8.0      Protein Negative Negative mg/dL    Glucose Negative Negative mg/dL    Ketone 5 (A) Negative mg/dL    Bilirubin Negative Negative      Blood Negative Negative      Urobilinogen 0.1 0.1 - 1.0 EU/dL    Nitrites Negative Negative      Leukocyte Esterase Negative Negative      UA:UC IF INDICATED Culture not indicated by UA result Culture not indicated by UA result      WBC 0-4 0 - 4 /hpf    RBC 0-5 0 - 5 /hpf    Bacteria Negative Negative /hpf    Mucus Trace /lpf   LACTIC ACID    Collection Time: 07/11/22 10:30 AM   Result Value Ref Range    Lactic acid 2.2 (HH) 0.4 - 2.0 mmol/L   CULTURE, BLOOD, PAIRED    Collection Time: 07/11/22 10:30 AM    Specimen: Blood   Result Value Ref Range    Special Requests: No Special Requests      Culture result: No growth after 1 hour     CK    Collection Time: 07/11/22 10:30 AM   Result Value Ref Range    CK 2,251 (H) 39 - 308 U/L   MAGNESIUM    Collection Time: 07/11/22 11:00 AM   Result Value Ref Range    Magnesium 2.6 (H) 1.6 - 2.4 mg/dL   AMMONIA    Collection Time: 07/11/22 11:00 AM   Result Value Ref Range    Ammonia, plasma <10 <32 umol/L   PROTHROMBIN TIME + INR    Collection Time: 07/11/22 11:15 AM   Result Value Ref Range    Prothrombin time 19.2 (H) 11.9 - 14.6 sec    INR 1.6 (H) 0.9 - 1.1     PROCALCITONIN    Collection Time: 07/11/22 11:23 AM   Result Value Ref Range    Procalcitonin 0.24 (H) 0 ng/mL   TROPONIN-HIGH SENSITIVITY    Collection Time: 07/11/22 12:45 PM   Result Value Ref Range    Troponin-High Sensitivity 43 0 - 76 ng/L   LACTIC ACID    Collection Time: 07/11/22 12:50 PM   Result Value Ref Range    Lactic acid 1.5 0.4 - 2.0 mmol/L           CT HEAD WO CONT   Final Result   No acute intracranial abnormality. There is a LEFT frontal approach  shunt   catheter in place with the tip to the LEFT of midline. CT SPINE CERV WO CONT   Final Result      1. No evidence of acute fracture or subluxation of the cervical spine. 2. Moderate to severe degenerative disc disease at C5-C6 and C6-C7. There is   multilevel right-sided moderate to severe facet arthritis. CT CHEST W CONT   Final Result   1. Comminuted intertrochanteric left femur fracture.  Subacute-appearing right   11th and 12th rib fractures. Several Chronic-appearing left rib fractures. 2.  Fat stranding/edema/contusion in the left retroperitoneum. Suspected   intramuscular hematomas within the left iliopsoas and gluteal compartments. 3.  Right bladder mass, concerning for neoplasm. Nonemergent cystoscopy   recommended for further evaluation. 4.  Left upper lobe partially calcified nodule, not seen on prior exams. Attention on follow-up. 5.  Additional nonacute findings as above. CT ABD PELV W CONT   Final Result   1. Comminuted intertrochanteric left femur fracture. Subacute-appearing right   11th and 12th rib fractures. Several Chronic-appearing left rib fractures. 2.  Fat stranding/edema/contusion in the left retroperitoneum. Suspected   intramuscular hematomas within the left iliopsoas and gluteal compartments. 3.  Right bladder mass, concerning for neoplasm. Nonemergent cystoscopy   recommended for further evaluation. 4.  Left upper lobe partially calcified nodule, not seen on prior exams. Attention on follow-up. 5.  Additional nonacute findings as above. XR CHEST PORT   Final Result   No acute process. XR SHOULDER RT AP/LAT MIN 2 V   Final Result   No acute abnormality. XR HIP RT W OR WO PELV 2-3 VWS   Final Result   1. Comminuted intertrochanteric left hip fracture         XR HIP LT W OR WO PELV 2-3 VWS   Final Result   1. Comminuted intertrochanteric anterior left hip fracture         XR FEMUR LT 2 V    (Results Pending)     Assessment:  Problem List Items Addressed This Visit     None      Visit Diagnoses     Intertrochanteric fracture of left hip, closed, initial encounter ([facility] [address]    -  Primary    Traumatic rhabdomyolysis, initial encounter ([facility] [address]        Hematoma of left iliopsoas muscle, initial encounter        Closed fracture of multiple ribs of left side, initial encounter               Plan:    1. Admission  2. Diet   3. IV fluids  4. SCD  5. IS  6.  Pain medications  7. Antibiotics  8. Nausea medication  9. Ivey  10. Labs in am  11. Consult: General surgery & Ortho   Thank you for the consultation & allowing me to participate in the care of this patient.

## 2022-07-12 ENCOUNTER — APPOINTMENT (OUTPATIENT)
Dept: GENERAL RADIOLOGY | Age: 87
DRG: 956 | End: 2022-07-12
Attending: ORTHOPAEDIC SURGERY
Payer: MEDICARE

## 2022-07-12 ENCOUNTER — ANESTHESIA EVENT (OUTPATIENT)
Dept: SURGERY | Age: 87
DRG: 956 | End: 2022-07-12
Payer: MEDICARE

## 2022-07-12 ENCOUNTER — ANESTHESIA (OUTPATIENT)
Dept: SURGERY | Age: 87
DRG: 956 | End: 2022-07-12
Payer: MEDICARE

## 2022-07-12 ENCOUNTER — APPOINTMENT (OUTPATIENT)
Dept: NON INVASIVE DIAGNOSTICS | Age: 87
DRG: 956 | End: 2022-07-12
Attending: INTERNAL MEDICINE
Payer: MEDICARE

## 2022-07-12 LAB
ALBUMIN SERPL-MCNC: 2.4 G/DL (ref 3.5–5)
ALBUMIN/GLOB SERPL: 0.8 {RATIO} (ref 1.1–2.2)
ALP SERPL-CCNC: 48 U/L (ref 45–117)
ALT SERPL-CCNC: 24 U/L (ref 12–78)
ANION GAP SERPL CALC-SCNC: 8 MMOL/L (ref 5–15)
AST SERPL W P-5'-P-CCNC: 58 U/L (ref 15–37)
BASOPHILS # BLD: 0 K/UL (ref 0–0.1)
BASOPHILS NFR BLD: 0 % (ref 0–1)
BILIRUB SERPL-MCNC: 0.6 MG/DL (ref 0.2–1)
BUN SERPL-MCNC: 44 MG/DL (ref 6–20)
BUN/CREAT SERPL: 41 (ref 12–20)
CA-I BLD-MCNC: 8 MG/DL (ref 8.5–10.1)
CHLORIDE SERPL-SCNC: 113 MMOL/L (ref 97–108)
CK SERPL-CCNC: 734 U/L (ref 39–308)
CO2 SERPL-SCNC: 23 MMOL/L (ref 21–32)
CREAT SERPL-MCNC: 1.07 MG/DL (ref 0.7–1.3)
DIFFERENTIAL METHOD BLD: ABNORMAL
EOSINOPHIL # BLD: 0 K/UL (ref 0–0.4)
EOSINOPHIL NFR BLD: 0 % (ref 0–7)
ERYTHROCYTE [DISTWIDTH] IN BLOOD BY AUTOMATED COUNT: 15 % (ref 11.5–14.5)
GLOBULIN SER CALC-MCNC: 3.2 G/DL (ref 2–4)
GLUCOSE SERPL-MCNC: 86 MG/DL (ref 65–100)
HCT VFR BLD AUTO: 27.7 % (ref 36.6–50.3)
HGB BLD-MCNC: 8.9 G/DL (ref 12.1–17)
IMM GRANULOCYTES # BLD AUTO: 0.1 K/UL (ref 0–0.04)
IMM GRANULOCYTES NFR BLD AUTO: 1 % (ref 0–0.5)
LYMPHOCYTES # BLD: 1 K/UL (ref 0.8–3.5)
LYMPHOCYTES NFR BLD: 8 % (ref 12–49)
MAGNESIUM SERPL-MCNC: 2.2 MG/DL (ref 1.6–2.4)
MCH RBC QN AUTO: 30.6 PG (ref 26–34)
MCHC RBC AUTO-ENTMCNC: 32.1 G/DL (ref 30–36.5)
MCV RBC AUTO: 95.2 FL (ref 80–99)
MONOCYTES # BLD: 1.4 K/UL (ref 0–1)
MONOCYTES NFR BLD: 11 % (ref 5–13)
NEUTS SEG # BLD: 10.2 K/UL (ref 1.8–8)
NEUTS SEG NFR BLD: 80 % (ref 32–75)
NRBC # BLD: 0 K/UL (ref 0–0.01)
NRBC BLD-RTO: 0 PER 100 WBC
PLATELET # BLD AUTO: 144 K/UL (ref 150–400)
PMV BLD AUTO: 12.3 FL (ref 8.9–12.9)
POTASSIUM SERPL-SCNC: 4 MMOL/L (ref 3.5–5.1)
PROT SERPL-MCNC: 5.6 G/DL (ref 6.4–8.2)
RBC # BLD AUTO: 2.91 M/UL (ref 4.1–5.7)
SODIUM SERPL-SCNC: 144 MMOL/L (ref 136–145)
TROPONIN-HIGH SENSITIVITY: 40 NG/L (ref 0–76)
WBC # BLD AUTO: 12.7 K/UL (ref 4.1–11.1)

## 2022-07-12 PROCEDURE — 94640 AIRWAY INHALATION TREATMENT: CPT

## 2022-07-12 PROCEDURE — C1769 GUIDE WIRE: HCPCS | Performed by: ORTHOPAEDIC SURGERY

## 2022-07-12 PROCEDURE — 77030031139 HC SUT VCRL2 J&J -A: Performed by: ORTHOPAEDIC SURGERY

## 2022-07-12 PROCEDURE — 74011250636 HC RX REV CODE- 250/636: Performed by: NURSE ANESTHETIST, CERTIFIED REGISTERED

## 2022-07-12 PROCEDURE — 2709999900 HC NON-CHARGEABLE SUPPLY: Performed by: ORTHOPAEDIC SURGERY

## 2022-07-12 PROCEDURE — 82550 ASSAY OF CK (CPK): CPT

## 2022-07-12 PROCEDURE — 0QS706Z REPOSITION LEFT UPPER FEMUR WITH INTRAMEDULLARY INTERNAL FIXATION DEVICE, OPEN APPROACH: ICD-10-PCS | Performed by: ORTHOPAEDIC SURGERY

## 2022-07-12 PROCEDURE — 77030016451 HC BIT DRL AO3 STRY -C: Performed by: ORTHOPAEDIC SURGERY

## 2022-07-12 PROCEDURE — C1713 ANCHOR/SCREW BN/BN,TIS/BN: HCPCS | Performed by: ORTHOPAEDIC SURGERY

## 2022-07-12 PROCEDURE — 74011000250 HC RX REV CODE- 250: Performed by: NURSE ANESTHETIST, CERTIFIED REGISTERED

## 2022-07-12 PROCEDURE — 83735 ASSAY OF MAGNESIUM: CPT

## 2022-07-12 PROCEDURE — 74011250637 HC RX REV CODE- 250/637: Performed by: PHYSICIAN ASSISTANT

## 2022-07-12 PROCEDURE — 76010000153 HC OR TIME 1.5 TO 2 HR: Performed by: ORTHOPAEDIC SURGERY

## 2022-07-12 PROCEDURE — 74011250636 HC RX REV CODE- 250/636: Performed by: ORTHOPAEDIC SURGERY

## 2022-07-12 PROCEDURE — 76060000034 HC ANESTHESIA 1.5 TO 2 HR: Performed by: ORTHOPAEDIC SURGERY

## 2022-07-12 PROCEDURE — 65270000029 HC RM PRIVATE

## 2022-07-12 PROCEDURE — 74011250637 HC RX REV CODE- 250/637: Performed by: ORTHOPAEDIC SURGERY

## 2022-07-12 PROCEDURE — 74011000250 HC RX REV CODE- 250: Performed by: ANESTHESIOLOGY

## 2022-07-12 PROCEDURE — 80053 COMPREHEN METABOLIC PANEL: CPT

## 2022-07-12 PROCEDURE — 74011250637 HC RX REV CODE- 250/637: Performed by: INTERNAL MEDICINE

## 2022-07-12 PROCEDURE — 74011250636 HC RX REV CODE- 250/636: Performed by: EMERGENCY MEDICINE

## 2022-07-12 PROCEDURE — 85025 COMPLETE CBC W/AUTO DIFF WBC: CPT

## 2022-07-12 PROCEDURE — 74011000258 HC RX REV CODE- 258: Performed by: ORTHOPAEDIC SURGERY

## 2022-07-12 PROCEDURE — 3E0R3BZ INTRODUCTION OF ANESTHETIC AGENT INTO SPINAL CANAL, PERCUTANEOUS APPROACH: ICD-10-PCS | Performed by: HOSPITALIST

## 2022-07-12 PROCEDURE — 76210000006 HC OR PH I REC 0.5 TO 1 HR: Performed by: ORTHOPAEDIC SURGERY

## 2022-07-12 PROCEDURE — 77030007866 HC KT SPN ANES BBMI -B: Performed by: ANESTHESIOLOGY

## 2022-07-12 PROCEDURE — 77030040361 HC SLV COMPR DVT MDII -B: Performed by: ORTHOPAEDIC SURGERY

## 2022-07-12 PROCEDURE — 77030013079 HC BLNKT BAIR HGGR 3M -A: Performed by: ANESTHESIOLOGY

## 2022-07-12 PROCEDURE — 76000 FLUOROSCOPY <1 HR PHYS/QHP: CPT

## 2022-07-12 PROCEDURE — 74011000258 HC RX REV CODE- 258: Performed by: EMERGENCY MEDICINE

## 2022-07-12 PROCEDURE — 72170 X-RAY EXAM OF PELVIS: CPT

## 2022-07-12 PROCEDURE — 77030040392 HC DRSG OPTIFOAM MDII -A: Performed by: ORTHOPAEDIC SURGERY

## 2022-07-12 PROCEDURE — 36415 COLL VENOUS BLD VENIPUNCTURE: CPT

## 2022-07-12 PROCEDURE — 77030011264 HC ELECTRD BLD EXT COVD -A: Performed by: ORTHOPAEDIC SURGERY

## 2022-07-12 PROCEDURE — 84484 ASSAY OF TROPONIN QUANT: CPT

## 2022-07-12 DEVICE — LOCKING SCREW, FULLY THREADED: Type: IMPLANTABLE DEVICE | Site: HIP | Status: FUNCTIONAL

## 2022-07-12 DEVICE — LAG SCREW, TI
Type: IMPLANTABLE DEVICE | Site: HIP | Status: FUNCTIONAL
Brand: GAMMA

## 2022-07-12 DEVICE — TROCHANTERIC NAIL KIT, TI
Type: IMPLANTABLE DEVICE | Site: HIP | Status: FUNCTIONAL
Brand: GAMMA

## 2022-07-12 RX ORDER — HYDROCODONE BITARTRATE AND ACETAMINOPHEN 5; 325 MG/1; MG/1
1 TABLET ORAL
Status: DISCONTINUED | OUTPATIENT
Start: 2022-07-12 | End: 2022-07-12 | Stop reason: HOSPADM

## 2022-07-12 RX ORDER — MORPHINE SULFATE 15 MG/1
15 TABLET ORAL
Status: DISCONTINUED | OUTPATIENT
Start: 2022-07-12 | End: 2022-07-12 | Stop reason: HOSPADM

## 2022-07-12 RX ORDER — DIPHENHYDRAMINE HYDROCHLORIDE 50 MG/ML
12.5 INJECTION, SOLUTION INTRAMUSCULAR; INTRAVENOUS AS NEEDED
Status: DISCONTINUED | OUTPATIENT
Start: 2022-07-12 | End: 2022-07-12 | Stop reason: HOSPADM

## 2022-07-12 RX ORDER — HYDROMORPHONE HYDROCHLORIDE 1 MG/ML
0.5 INJECTION, SOLUTION INTRAMUSCULAR; INTRAVENOUS; SUBCUTANEOUS
Status: DISCONTINUED | OUTPATIENT
Start: 2022-07-12 | End: 2022-07-12 | Stop reason: HOSPADM

## 2022-07-12 RX ORDER — SODIUM CHLORIDE 0.9 % (FLUSH) 0.9 %
5-40 SYRINGE (ML) INJECTION EVERY 8 HOURS
Status: DISCONTINUED | OUTPATIENT
Start: 2022-07-12 | End: 2022-07-12 | Stop reason: SDUPTHER

## 2022-07-12 RX ORDER — OXYCODONE HYDROCHLORIDE 5 MG/1
5 TABLET ORAL
Status: DISCONTINUED | OUTPATIENT
Start: 2022-07-12 | End: 2022-07-12 | Stop reason: HOSPADM

## 2022-07-12 RX ORDER — PROPOFOL 10 MG/ML
INJECTION, EMULSION INTRAVENOUS
Status: DISCONTINUED | OUTPATIENT
Start: 2022-07-12 | End: 2022-07-12 | Stop reason: HOSPADM

## 2022-07-12 RX ORDER — METOPROLOL TARTRATE 5 MG/5ML
2.5 INJECTION INTRAVENOUS
Status: DISCONTINUED | OUTPATIENT
Start: 2022-07-12 | End: 2022-07-12 | Stop reason: HOSPADM

## 2022-07-12 RX ORDER — HYDRALAZINE HYDROCHLORIDE 20 MG/ML
10 INJECTION INTRAMUSCULAR; INTRAVENOUS
Status: DISCONTINUED | OUTPATIENT
Start: 2022-07-12 | End: 2022-07-12 | Stop reason: HOSPADM

## 2022-07-12 RX ORDER — NORETHINDRONE AND ETHINYL ESTRADIOL 0.5-0.035
KIT ORAL AS NEEDED
Status: DISCONTINUED | OUTPATIENT
Start: 2022-07-12 | End: 2022-07-12 | Stop reason: HOSPADM

## 2022-07-12 RX ORDER — TRAMADOL HYDROCHLORIDE 50 MG/1
25 TABLET ORAL
Status: DISCONTINUED | OUTPATIENT
Start: 2022-07-12 | End: 2022-07-14 | Stop reason: HOSPADM

## 2022-07-12 RX ORDER — SODIUM CHLORIDE 0.9 % (FLUSH) 0.9 %
5-40 SYRINGE (ML) INJECTION AS NEEDED
Status: DISCONTINUED | OUTPATIENT
Start: 2022-07-12 | End: 2022-07-12 | Stop reason: SDUPTHER

## 2022-07-12 RX ORDER — SODIUM CHLORIDE 0.9 % (FLUSH) 0.9 %
5-40 SYRINGE (ML) INJECTION AS NEEDED
Status: DISCONTINUED | OUTPATIENT
Start: 2022-07-12 | End: 2022-07-14 | Stop reason: HOSPADM

## 2022-07-12 RX ORDER — OXYCODONE HYDROCHLORIDE 5 MG/1
2.5 TABLET ORAL
Status: DISCONTINUED | OUTPATIENT
Start: 2022-07-12 | End: 2022-07-14 | Stop reason: HOSPADM

## 2022-07-12 RX ORDER — LIDOCAINE HYDROCHLORIDE 10 MG/ML
0.1 INJECTION, SOLUTION EPIDURAL; INFILTRATION; INTRACAUDAL; PERINEURAL AS NEEDED
Status: DISCONTINUED | OUTPATIENT
Start: 2022-07-12 | End: 2022-07-14 | Stop reason: HOSPADM

## 2022-07-12 RX ORDER — SODIUM CHLORIDE 0.9 % (FLUSH) 0.9 %
5-40 SYRINGE (ML) INJECTION AS NEEDED
Status: DISCONTINUED | OUTPATIENT
Start: 2022-07-12 | End: 2022-07-12 | Stop reason: HOSPADM

## 2022-07-12 RX ORDER — OXYCODONE AND ACETAMINOPHEN 5; 325 MG/1; MG/1
1 TABLET ORAL AS NEEDED
Status: DISCONTINUED | OUTPATIENT
Start: 2022-07-12 | End: 2022-07-12 | Stop reason: HOSPADM

## 2022-07-12 RX ORDER — ACETAMINOPHEN 500 MG
1000 TABLET ORAL EVERY 6 HOURS
Status: DISCONTINUED | OUTPATIENT
Start: 2022-07-12 | End: 2022-07-14 | Stop reason: HOSPADM

## 2022-07-12 RX ORDER — SODIUM CHLORIDE 9 MG/ML
125 INJECTION, SOLUTION INTRAVENOUS CONTINUOUS
Status: DISPENSED | OUTPATIENT
Start: 2022-07-12 | End: 2022-07-13

## 2022-07-12 RX ORDER — HYDROMORPHONE HYDROCHLORIDE 2 MG/1
1 TABLET ORAL
Status: DISCONTINUED | OUTPATIENT
Start: 2022-07-12 | End: 2022-07-12 | Stop reason: HOSPADM

## 2022-07-12 RX ORDER — DEXAMETHASONE SODIUM PHOSPHATE 4 MG/ML
INJECTION, SOLUTION INTRA-ARTICULAR; INTRALESIONAL; INTRAMUSCULAR; INTRAVENOUS; SOFT TISSUE AS NEEDED
Status: DISCONTINUED | OUTPATIENT
Start: 2022-07-12 | End: 2022-07-12 | Stop reason: HOSPADM

## 2022-07-12 RX ORDER — ONDANSETRON 2 MG/ML
4 INJECTION INTRAMUSCULAR; INTRAVENOUS AS NEEDED
Status: DISCONTINUED | OUTPATIENT
Start: 2022-07-12 | End: 2022-07-12 | Stop reason: HOSPADM

## 2022-07-12 RX ORDER — FENTANYL CITRATE 50 UG/ML
50 INJECTION, SOLUTION INTRAMUSCULAR; INTRAVENOUS AS NEEDED
Status: DISCONTINUED | OUTPATIENT
Start: 2022-07-12 | End: 2022-07-12 | Stop reason: HOSPADM

## 2022-07-12 RX ORDER — ONDANSETRON 2 MG/ML
INJECTION INTRAMUSCULAR; INTRAVENOUS AS NEEDED
Status: DISCONTINUED | OUTPATIENT
Start: 2022-07-12 | End: 2022-07-12 | Stop reason: HOSPADM

## 2022-07-12 RX ORDER — SODIUM CHLORIDE 0.9 % (FLUSH) 0.9 %
5-40 SYRINGE (ML) INJECTION EVERY 8 HOURS
Status: DISCONTINUED | OUTPATIENT
Start: 2022-07-12 | End: 2022-07-12 | Stop reason: HOSPADM

## 2022-07-12 RX ORDER — MIDAZOLAM HYDROCHLORIDE 1 MG/ML
0.5 INJECTION, SOLUTION INTRAMUSCULAR; INTRAVENOUS
Status: DISCONTINUED | OUTPATIENT
Start: 2022-07-12 | End: 2022-07-12 | Stop reason: HOSPADM

## 2022-07-12 RX ORDER — LABETALOL HCL 20 MG/4 ML
5 SYRINGE (ML) INTRAVENOUS
Status: DISCONTINUED | OUTPATIENT
Start: 2022-07-12 | End: 2022-07-12 | Stop reason: HOSPADM

## 2022-07-12 RX ORDER — FENTANYL CITRATE 50 UG/ML
50 INJECTION, SOLUTION INTRAMUSCULAR; INTRAVENOUS
Status: DISCONTINUED | OUTPATIENT
Start: 2022-07-12 | End: 2022-07-12 | Stop reason: HOSPADM

## 2022-07-12 RX ORDER — CEFAZOLIN SODIUM 1 G/3ML
INJECTION, POWDER, FOR SOLUTION INTRAMUSCULAR; INTRAVENOUS AS NEEDED
Status: DISCONTINUED | OUTPATIENT
Start: 2022-07-12 | End: 2022-07-12 | Stop reason: HOSPADM

## 2022-07-12 RX ORDER — NORETHINDRONE AND ETHINYL ESTRADIOL 0.5-0.035
5 KIT ORAL AS NEEDED
Status: DISCONTINUED | OUTPATIENT
Start: 2022-07-12 | End: 2022-07-12 | Stop reason: HOSPADM

## 2022-07-12 RX ORDER — SODIUM CHLORIDE, SODIUM LACTATE, POTASSIUM CHLORIDE, CALCIUM CHLORIDE 600; 310; 30; 20 MG/100ML; MG/100ML; MG/100ML; MG/100ML
INJECTION, SOLUTION INTRAVENOUS
Status: DISCONTINUED | OUTPATIENT
Start: 2022-07-12 | End: 2022-07-12 | Stop reason: HOSPADM

## 2022-07-12 RX ORDER — OXYCODONE AND ACETAMINOPHEN 5; 325 MG/1; MG/1
1 TABLET ORAL
Status: DISCONTINUED | OUTPATIENT
Start: 2022-07-12 | End: 2022-07-12 | Stop reason: HOSPADM

## 2022-07-12 RX ORDER — BUPIVACAINE HYDROCHLORIDE 7.5 MG/ML
INJECTION, SOLUTION INTRASPINAL
Status: DISCONTINUED | OUTPATIENT
Start: 2022-07-12 | End: 2022-07-12 | Stop reason: HOSPADM

## 2022-07-12 RX ORDER — LIDOCAINE HYDROCHLORIDE 20 MG/ML
INJECTION, SOLUTION EPIDURAL; INFILTRATION; INTRACAUDAL; PERINEURAL AS NEEDED
Status: DISCONTINUED | OUTPATIENT
Start: 2022-07-12 | End: 2022-07-12 | Stop reason: HOSPADM

## 2022-07-12 RX ORDER — MIDAZOLAM HYDROCHLORIDE 1 MG/ML
1 INJECTION, SOLUTION INTRAMUSCULAR; INTRAVENOUS AS NEEDED
Status: DISCONTINUED | OUTPATIENT
Start: 2022-07-12 | End: 2022-07-12 | Stop reason: HOSPADM

## 2022-07-12 RX ORDER — ASPIRIN 325 MG
325 TABLET, DELAYED RELEASE (ENTERIC COATED) ORAL 2 TIMES DAILY
Status: DISCONTINUED | OUTPATIENT
Start: 2022-07-12 | End: 2022-07-14 | Stop reason: HOSPADM

## 2022-07-12 RX ORDER — CELECOXIB 100 MG/1
100 CAPSULE ORAL 2 TIMES DAILY
Status: DISCONTINUED | OUTPATIENT
Start: 2022-07-13 | End: 2022-07-14 | Stop reason: HOSPADM

## 2022-07-12 RX ORDER — CELECOXIB 100 MG/1
100 CAPSULE ORAL 2 TIMES DAILY
Status: DISCONTINUED | OUTPATIENT
Start: 2022-07-13 | End: 2022-07-12 | Stop reason: SDUPTHER

## 2022-07-12 RX ORDER — MORPHINE SULFATE 2 MG/ML
2 INJECTION, SOLUTION INTRAMUSCULAR; INTRAVENOUS
Status: DISCONTINUED | OUTPATIENT
Start: 2022-07-12 | End: 2022-07-12 | Stop reason: HOSPADM

## 2022-07-12 RX ADMIN — ASPIRIN 325 MG: 325 TABLET, COATED ORAL at 22:24

## 2022-07-12 RX ADMIN — EPHEDRINE SULFATE 50 MG: 50 INJECTION INTRAVENOUS at 16:10

## 2022-07-12 RX ADMIN — CEFAZOLIN SODIUM 2 G: 1 INJECTION, POWDER, FOR SOLUTION INTRAMUSCULAR; INTRAVENOUS at 16:43

## 2022-07-12 RX ADMIN — PIPERACILLIN AND TAZOBACTAM 3.38 G: 3; .375 INJECTION, POWDER, LYOPHILIZED, FOR SOLUTION INTRAVENOUS at 18:47

## 2022-07-12 RX ADMIN — PHENYLEPHRINE HYDROCHLORIDE 200 MCG: 10 INJECTION INTRAVENOUS at 16:09

## 2022-07-12 RX ADMIN — PHENYLEPHRINE HYDROCHLORIDE 100 MCG: 10 INJECTION INTRAVENOUS at 16:41

## 2022-07-12 RX ADMIN — SODIUM CHLORIDE, POTASSIUM CHLORIDE, SODIUM LACTATE AND CALCIUM CHLORIDE: 600; 310; 30; 20 INJECTION, SOLUTION INTRAVENOUS at 15:45

## 2022-07-12 RX ADMIN — BUDESONIDE AND FORMOTEROL FUMARATE DIHYDRATE 2 PUFF: 160; 4.5 AEROSOL RESPIRATORY (INHALATION) at 08:22

## 2022-07-12 RX ADMIN — PHENYLEPHRINE HYDROCHLORIDE 200 MCG: 10 INJECTION INTRAVENOUS at 16:49

## 2022-07-12 RX ADMIN — LIDOCAINE HYDROCHLORIDE 40 MG: 20 INJECTION, SOLUTION EPIDURAL; INFILTRATION; INTRACAUDAL; PERINEURAL at 16:00

## 2022-07-12 RX ADMIN — BUPIVACAINE HYDROCHLORIDE 13 MG: 7.5 INJECTION, SOLUTION INTRASPINAL at 16:08

## 2022-07-12 RX ADMIN — PIPERACILLIN AND TAZOBACTAM 3.38 G: 3; .375 INJECTION, POWDER, LYOPHILIZED, FOR SOLUTION INTRAVENOUS at 04:01

## 2022-07-12 RX ADMIN — PIPERACILLIN AND TAZOBACTAM 3.38 G: 3; .375 INJECTION, POWDER, LYOPHILIZED, FOR SOLUTION INTRAVENOUS at 11:01

## 2022-07-12 RX ADMIN — DEXAMETHASONE SODIUM PHOSPHATE 4 MG: 4 INJECTION, SOLUTION INTRA-ARTICULAR; INTRALESIONAL; INTRAMUSCULAR; INTRAVENOUS; SOFT TISSUE at 16:00

## 2022-07-12 RX ADMIN — CELECOXIB 100 MG: 100 CAPSULE ORAL at 09:16

## 2022-07-12 RX ADMIN — PROPOFOL 75 MCG/KG/MIN: 10 INJECTION, EMULSION INTRAVENOUS at 16:00

## 2022-07-12 RX ADMIN — ACETAMINOPHEN 1000 MG: 500 TABLET ORAL at 22:24

## 2022-07-12 RX ADMIN — ONDANSETRON 4 MG: 2 INJECTION INTRAMUSCULAR; INTRAVENOUS at 16:00

## 2022-07-12 RX ADMIN — SODIUM CHLORIDE 125 ML/HR: 9 INJECTION, SOLUTION INTRAVENOUS at 22:25

## 2022-07-12 NOTE — ANESTHESIA PROCEDURE NOTES
Spinal Block    Start time: 7/12/2022 4:00 PM  End time: 7/12/2022 4:08 PM  Performed by: Kassandra Kovacs CRNA  Authorized by: Nagi England MD     Pre-procedure:   Indications: primary anesthetic  Preanesthetic Checklist: patient identified, risks and benefits discussed, anesthesia consent, site marked, patient being monitored, timeout performed and fire risk safety assessment completed and verbalized      Spinal Block:   Patient Position:  Seated  Prep Region:  Lumbar  Prep: Betadine      Location:  L3-4  Technique:  Single shot  Local: bupivacaine 0.75% in dextrose 8.25% preserv-free (SENSORCAINE) Intrathecal, 13 mg  Local Dose (mL):  1.7  Med Admin Time: 7/12/2022 4:08 PM    Needle:   Needle Type:  Pencan  Needle Gauge:  25 G  Attempts:  1      Events: CSF confirmed, no blood with aspiration and no paresthesia        Assessment:  Insertion:  Uncomplicated  Patient tolerance:  Patient tolerated the procedure well with no immediate complications

## 2022-07-12 NOTE — PROGRESS NOTES
Reason for Admission:  Fracture hip, rhabdomyolysis                    RUR Score:  15%                PCP: First and Last name:   Simone Lomeli MD   Name of Practice:    Are you a current patient: Yes/No: yes   Approximate date of last visit: unknown - only been virtual the last year   Can you participate in a virtual visit if needed: yes    Do you (patient/family) have any concerns for transition/discharge? Family intends for patient to go to rehab then return to home under hospice care              Plan for utilizing home health: Current with [facility]     Current Advanced Directive/Advance Care Plan:  Full Code      Healthcare Decision Maker:   Click here to complete [address] including selection of the Healthcare Decision Maker Relationship (ie \"Primary\")       Primary Healthcare Decision Maker: Neto Wilson (niece) [phone]    Transition of Care Plan: Due to patient's confusion CM reached out to [facility] which is his niece, Vidhi Lawler, at number listed above to complete assessment. Patient was living at home alone with paid caregivers 5 hours/day 5 days/week. He was under hospice services with [facility]. Niece reported however the patient does not like the term hospice so they refer to it as \"home care. \" He has been under hospice for approximately the last year. We discussed the patient likely needing additional care around the clock at home. Pts niece understands but reported she and all the family live in [place] and they have been trying to get him to move there but he wants to remain in his own home. At the time of discharge she stated she was informed he would be going to rehab. She would like him to go to rehab and gave choice for [facility]. Choice obtained and referral has been sent.

## 2022-07-12 NOTE — PROGRESS NOTES
Attempted to provide family with an update using number provided on demographics sheet, no answer and no VM left.

## 2022-07-12 NOTE — PROGRESS NOTES
Bedside shift change report given to Kiersten Gibson (oncoming nurse) by Wilfredo Carrasco RN (offgoing nurse). Report included the following information SBAR, OR Summary, Intake/Output, MAR, Recent Results and Quality Measures.

## 2022-07-12 NOTE — ANESTHESIA PREPROCEDURE EVALUATION
Relevant Problems   RESPIRATORY SYSTEM   (+) Dyspnea      CARDIOVASCULAR   (+) Benign essential hypertension   (+) PAD (peripheral artery disease) (HCC)      RENAL FAILURE   (+) Renal cyst      PERSONAL HX & FAMILY HX OF CANCER   (+) Malignant neoplasm of prostate (HCC)       Anesthetic History   No history of anesthetic complications            Review of Systems / Medical History  Patient summary reviewed, nursing notes reviewed and pertinent labs reviewed    Pulmonary    COPD        Asthma        Neuro/Psych   Within defined limits           Cardiovascular    Hypertension          CAD, PAD and hyperlipidemia         GI/Hepatic/Renal     GERD           Endo/Other        Arthritis and cancer     Other Findings   Comments: Low PLTs         Past Medical History:   Diagnosis Date    Arthritis     Asthma     Burning with urination     Cancer ([facility] [address]     Prostate Finished radiation 3/2012    COPD     Essential tremor     GERD (gastroesophageal reflux disease)     Hyperlipidemia     Hypertension     PAD (peripheral artery disease) (HCC)        Past Surgical History:   Procedure Laterality Date    HC STNT SFA VIBN WLGO -H1  12/12/2005    LEFT Superficial Femoral Arter Stent    HX ENDOSCOPY      HX GI      Bleeding ulcers    HX HEART CATHETERIZATION      HX HEENT      L eye    HX HEENT      Cataract    HX ORTHOPAEDIC      R & L Carpal tunnel    HX OTHER SURGICAL      Barrets esophagus    HX UROLOGICAL  05/11/2011    Prostate Bx    HX UROLOGICAL      Brachytherapy    HX UROLOGICAL  05/11/2011    cysto, BRP       Current Outpatient Medications   Medication Instructions    albuterol (PROVENTIL VENTOLIN) 2.5 mg /3 mL (0.083 %) nebulizer solution ONCE    alfuzosin (UROXATRAL) 10 mg SR tablet DAILY    aspirin delayed-release 81 mg, DAILY    Calcium Carbonate-Vit D3-Min (CALTRATE 600+D PLUS MINERALS) 600 mg (1,500 mg)-400 unit Chew Take  by mouth.     candesartan (ATACAND) 8 mg tablet DAILY    cetirizine (ZYRTEC) 10 mg tablet DAILY    esomeprazole (NEXIUM) 40 mg capsule DAILY    ezetimibe (Zetia) 10 mg tablet Oral    fluticasone-salmeterol (ADVAIR DISKUS) 250-50 mcg/Dose diskus inhaler 1 Puff, EVERY 12 HOURS    gabapentin (NEURONTIN) 100 mg, EVERY BEDTIME    hydrocortisone (HYCORT) 1 % ointment 2 TIMES DAILY    mometasone (NASONEX) 50 mcg/actuation nasal spray 2 Sprays, DAILY    montelukast (SINGULAIR) 10 mg, DAILY    MV-Min-Folic Acid-Lutein (CENTRUM SILVER) 500-250 mcg Chew Take  by mouth.     omega-3 fatty acids-vitamin e (FISH OIL) 1,000 mg Cap 1 Capsule    psyllium (METAMUCIL SMOOTH TEXTURE) packet 1 Packet, DAILY    simvastatin (ZOCOR) 40 mg tablet EVERY BEDTIME    topiramate (TOPAMAX) 50 mg tablet Oral, 2 TIMES DAILY       Current Facility-Administered Medications   Medication Dose Route Frequency    piperacillin-tazobactam (ZOSYN) 3.375 g in 0.9% sodium chloride (MBP/ADV) 100 mL MBP  3.375 g IntraVENous Q8H    sodium chloride (NS) flush 5-40 mL  5-40 mL IntraVENous Q8H    sodium chloride (NS) flush 5-40 mL  5-40 mL IntraVENous PRN    acetaminophen (TYLENOL) tablet 650 mg  650 mg Oral Q6H PRN    Or    acetaminophen (TYLENOL) suppository 650 mg  650 mg Rectal Q6H PRN    polyethylene glycol (MIRALAX) packet 17 g  17 g Oral DAILY PRN    ondansetron (ZOFRAN ODT) tablet 4 mg  4 mg Oral Q8H PRN    Or    ondansetron (ZOFRAN) injection 4 mg  4 mg IntraVENous Q6H PRN    budesonide-formoteroL (SYMBICORT) 160-4.5 mcg/actuation HFA inhaler 2 Puff  2 Puff Inhalation BID    0.9% sodium chloride infusion  100 mL/hr IntraVENous CONTINUOUS    albuterol-ipratropium (DUO-NEB) 2.5 MG-0.5 MG/3 ML  3 mL Nebulization Q4H PRN    celecoxib (CELEBREX) capsule 100 mg  100 mg Oral BID    [START ON 7/13/2022] levoFLOXacin (LEVAQUIN) 750 mg in D5W IVPB  750 mg IntraVENous Q48H       Patient Vitals for the past 24 hrs:   Temp Pulse Resp BP SpO2   07/12/22 1459 -- 80 19 124/70 94 %   07/12/22 1451 36.1 °C (97 °F) -- -- -- 93 %   07/12/22 1225 36.7 °C (98.1 °F) 85 19 134/68 91 %   07/12/22 1200 -- 90 -- -- --   07/12/22 0822 -- -- -- -- 93 %   07/12/22 0800 -- 90 -- -- --   07/12/22 0737 36.7 °C (98 °F) 87 14 105/62 92 %   07/12/22 0700 -- 90 -- -- --   07/12/22 0404 -- 84 -- 104/63 --   07/12/22 0400 -- 84 -- -- --   07/12/22 0222 -- 78 -- (!) 102/59 --   07/12/22 0209 36.9 °C (98.4 °F) 80 16 (!) 100/58 93 %   07/11/22 2000 -- 76 -- -- --   07/11/22 1923 36.8 °C (98.2 °F) 75 18 (!) 121/91 97 %       Lab Results   Component Value Date/Time    WBC 12.7 (H) 07/12/2022 06:24 AM    HGB 8.9 (L) 07/12/2022 06:24 AM    HCT 27.7 (L) 07/12/2022 06:24 AM    PLATELET 832 (L) 94/98/5340 06:24 AM    MCV 95.2 07/12/2022 06:24 AM     Lab Results   Component Value Date/Time    Sodium 144 07/12/2022 06:24 AM    Potassium 4.0 07/12/2022 06:24 AM    Chloride 113 (H) 07/12/2022 06:24 AM    CO2 23 07/12/2022 06:24 AM    Anion gap 8 07/12/2022 06:24 AM    Glucose 86 07/12/2022 06:24 AM    BUN 44 (H) 07/12/2022 06:24 AM    Creatinine 1.07 07/12/2022 06:24 AM    BUN/Creatinine ratio 41 (H) 07/12/2022 06:24 AM    GFR est AA >60 07/12/2022 06:24 AM    GFR est non-AA >60 07/12/2022 06:24 AM    Calcium 8.0 (L) 07/12/2022 06:24 AM     No results found for: APTT, PTP, INR, INREXT  Lab Results   Component Value Date/Time    Glucose 86 07/12/2022 06:24 AM          Anesthetic Plan    ASA: 3, emergent  Anesthesia type: spinal            Anesthetic plan and risks discussed with: Patient and Family      Benefits and risks of spinal anesthesia discussed with patient/family. All questions answered.

## 2022-07-12 NOTE — PROGRESS NOTES
Orthopedic progress note    Date:2022       Room:[facility] [place]  Patient [address]     Date of Birth:[birth year]     Age:89 y.o. Subjective    Follow-up on his 60-year-old male with a left hip fracture  Patient is lying comfortably in bed. Confused. No apparent distress. Objective           Vitals Last 24 Hours:  TEMPERATURE:  Temp  Av.5 °F (36.9 °C)  Min: 98 °F (36.7 °C)  Max: 99.3 °F (37.4 °C)  RESPIRATIONS RANGE: Resp  Avg: 15.5  Min: 14  Max: 18  PULSE OXIMETRY RANGE: SpO2  Av %  Min: 92 %  Max: 100 %  PULSE RANGE: Pulse  Av.6  Min: 75  Max: 89  BLOOD PRESSURE RANGE: Systolic (26VMK), OBF:463 , Min:100 , EEI:624   ; Diastolic (46KNV), VZM:52, Min:58, Max:91    Current Facility-Administered Medications   Medication Dose Route Frequency    piperacillin-tazobactam (ZOSYN) 3.375 g in 0.9% sodium chloride (MBP/ADV) 100 mL MBP  3.375 g IntraVENous Q8H    sodium chloride (NS) flush 5-40 mL  5-40 mL IntraVENous Q8H    sodium chloride (NS) flush 5-40 mL  5-40 mL IntraVENous PRN    acetaminophen (TYLENOL) tablet 650 mg  650 mg Oral Q6H PRN    Or    acetaminophen (TYLENOL) suppository 650 mg  650 mg Rectal Q6H PRN    polyethylene glycol (MIRALAX) packet 17 g  17 g Oral DAILY PRN    ondansetron (ZOFRAN ODT) tablet 4 mg  4 mg Oral Q8H PRN    Or    ondansetron (ZOFRAN) injection 4 mg  4 mg IntraVENous Q6H PRN    budesonide-formoteroL (SYMBICORT) 160-4.5 mcg/actuation HFA inhaler 2 Puff  2 Puff Inhalation BID    0.9% sodium chloride infusion  100 mL/hr IntraVENous CONTINUOUS    albuterol-ipratropium (DUO-NEB) 2.5 MG-0.5 MG/3 ML  3 mL Nebulization Q4H PRN    celecoxib (CELEBREX) capsule 100 mg  100 mg Oral BID    [START ON 2022] levoFLOXacin (LEVAQUIN) 750 mg in D5W IVPB  750 mg IntraVENous Q48H          I/O (24Hr):     Intake/Output Summary (Last 24 hours) at 2022 0825  Last data filed at 2022  Gross per 24 hour   Intake 1120 ml   Output --   Net 1120 ml Objective  Labs/Imaging/Diagnostics    Labs:  CBC:  Recent Labs     07/11/22  1030   WBC 20.6*   RBC 3.94*   HGB 11.9*   HCT 37.0   MCV 93.9   RDW 14.8*        CHEMISTRIES:  Recent Labs     07/12/22  0624 07/11/22  1100 07/11/22  1030     --  139   K 4.0  --  5.1   *  --  104   CO2 23  --  28   BUN 44*  --  43*   CREA 1.07  --  1.10   CA 8.0*  --  9.2   MG 2.2 2.6*  --    PT/INR:  Recent Labs     07/11/22  1115   INR 1.6*     APTT:No results for input(s): APTT in the last 72 hours. LIVER PROFILE:  Recent Labs     07/12/22  0624 07/11/22  1030   AST 58* 104*   ALT 24 35     Lab Results   Component Value Date/Time    ALT (SGPT) 24 07/12/2022 06:24 AM    AST (SGOT) 58 (H) 07/12/2022 06:24 AM    Alk. phosphatase 48 07/12/2022 06:24 AM    Bilirubin, total 0.6 07/12/2022 06:24 AM       Imaging: X-rays taken of his left femur, right scapula, and left elbow showed no acute fractures.       Assessment//Plan           Patient Active Problem List    Diagnosis Date Noted    Rhabdomyolysis 07/11/2022    Fracture, hip ([facility] [address] 07/11/2022    Benign essential hypertension 08/04/2021    Dyspnea 08/04/2021    Hyperlipidemia 08/04/2021    Tremor 08/04/2021    Enlarged prostate with urinary obstruction 07/27/2020    Malignant neoplasm of prostate ([facility] [address] 07/27/2020    Renal cyst 07/27/2020    Chronic prostatitis 07/27/2020    PAD (peripheral artery disease) ([address] 06/08/2011    Pure hypercholesterolemia 06/08/2011     Status post left hip fracture  Plan for OR today with Dr. Jared King        Electronically signed by Bel Chavez PA-C on 7/12/2022 at 8:25 AM

## 2022-07-12 NOTE — PROGRESS NOTES
TRANSFER - IN REPORT:    Verbal report received from [facility] on Cheryl Roldan  being received from PACU for routine post - op      Report consisted of patients Situation, Background, Assessment and   Recommendations(SBAR). Information from the following report(s) SBAR and OR Summary was reviewed with the receiving nurse. Opportunity for questions and clarification was provided. Assessment completed upon patients arrival to unit and care assumed. Patient assessed. Drowsy but arousable to voice. Left hip broken island dressings x3 with shadowing noted and marked by PACU nurse. No active bleeding. Pain with movement. Decreased sensation to LLE. Present sensation to RLE. +1 pedal pulses bilaterally. Skin warm and dry with appropriate color. VSS. [facility]) updated by Sona Teresa RN and she reported update by Dr. Linda Camarena. No concerns at this time, will continue to monitor.

## 2022-07-12 NOTE — PROGRESS NOTES
Hospitalist Progress Note         COLIN Ruelas, FNP-C    Daily Progress Note: 7/12/2022    89M with a past medical history of Arthritis, Asthma, Burning with urination, prostate cancer, COPD, Essential tremor, GERD (gastroesophageal reflux disease), Hyperlipidemia, Hypertension, and PAD (peripheral artery disease). He lives alone and has some occasional caregivers.     7/11/22 presents to hospital after being found down on the floor. Reportedly last seen at baseline two days prior. Patient is a poor historian and history is limited due to dementia versus encephalopathy. Patient reports falling and being on the floor for at least a day. During the ED course he reported right shoulder pain on palpation. Additional areas include both hips. He had several skin tears over the knees and the right hip. He underwent extensive trauma imaging and noted to have a hip fracture. Labs notable for markedly elevated CK. I have been asked to admit him to hospital for further stabilization and workup of his condition. Subjective:   Subjective   Patient alert and confused sitting in bed. Family present at bedside. No concerns offered on examination. Review of Systems:   Review of Systems   Unable to perform ROS: Dementia   Constitutional: Negative for chills and fever. Respiratory: Negative for cough. Cardiovascular: Negative for chest pain. Musculoskeletal: Positive for falls and joint pain. Objective:   Objective      Vitals:  Patient Vitals for the past 12 hrs:   Temp Pulse Resp BP SpO2   07/12/22 1225 98.1 °F (36.7 °C) 85 19 134/68 91 %   07/12/22 1200 -- 90 -- -- --   07/12/22 0822 -- -- -- -- 93 %   07/12/22 0800 -- 90 -- -- --   07/12/22 0737 98 °F (36.7 °C) 87 14 105/62 92 %   07/12/22 0700 -- 90 -- -- --   07/12/22 0404 -- 84 -- 104/63 --   07/12/22 0400 -- 84 -- -- --        Physical Exam:  Physical Exam  Vitals and nursing note reviewed.    Eyes:      Extraocular Movements: Extraocular movements intact. Cardiovascular:      Rate and Rhythm: Normal rate. Pulmonary:      Breath sounds: Normal breath sounds. Abdominal:      General: Bowel sounds are normal.   Musculoskeletal:         General: Signs of injury present. Comments: Left hip injury, limited ROM   Neurological:      Mental Status: He is alert. He is disoriented. Psychiatric:      Comments: Confused, pleasant, follows commands          Lab Results:  Recent Results (from the past 24 hour(s))   TROPONIN-HIGH SENSITIVITY    Collection Time: 07/11/22  4:07 PM   Result Value Ref Range    Troponin-High Sensitivity 38 0 - 76 ng/L   CK    Collection Time: 07/11/22  4:07 PM   Result Value Ref Range    CK 1,679 (H) 39 - 308 U/L   COVID-19 RAPID TEST    Collection Time: 07/11/22  4:07 PM   Result Value Ref Range    COVID-19 rapid test Not Detected Not Detected     MRSA SCREEN - PCR (NASAL)    Collection Time: 07/11/22  4:07 PM   Result Value Ref Range    MRSA by PCR, Nasal Not Detected Not Detected     TROPONIN-HIGH SENSITIVITY    Collection Time: 07/11/22 10:03 PM   Result Value Ref Range    Troponin-High Sensitivity 48 0 - 76 ng/L   CK    Collection Time: 07/11/22 10:03 PM   Result Value Ref Range    CK 1,151 (H) 39 - 191 U/L   METABOLIC PANEL, COMPREHENSIVE    Collection Time: 07/12/22  6:24 AM   Result Value Ref Range    Sodium 144 136 - 145 mmol/L    Potassium 4.0 3.5 - 5.1 mmol/L    Chloride 113 (H) 97 - 108 mmol/L    CO2 23 21 - 32 mmol/L    Anion gap 8 5 - 15 mmol/L    Glucose 86 65 - 100 mg/dL    BUN 44 (H) 6 - 20 mg/dL    Creatinine 1.07 0.70 - 1.30 mg/dL    BUN/Creatinine ratio 41 (H) 12 - 20      GFR est AA >60 >60 ml/min/1.73m2    GFR est non-AA >60 >60 ml/min/1.73m2    Calcium 8.0 (L) 8.5 - 10.1 mg/dL    Bilirubin, total 0.6 0.2 - 1.0 mg/dL    AST (SGOT) 58 (H) 15 - 37 U/L    ALT (SGPT) 24 12 - 78 U/L    Alk.  phosphatase 48 45 - 117 U/L    Protein, total 5.6 (L) 6.4 - 8.2 g/dL    Albumin 2.4 (L) 3.5 - 5.0 g/dL    Globulin 3.2 2.0 - 4.0 g/dL    A-G Ratio 0.8 (L) 1.1 - 2.2     MAGNESIUM    Collection Time: 07/12/22  6:24 AM   Result Value Ref Range    Magnesium 2.2 1.6 - 2.4 mg/dL   CBC WITH AUTOMATED DIFF    Collection Time: 07/12/22  6:24 AM   Result Value Ref Range    WBC 12.7 (H) 4.1 - 11.1 K/uL    RBC 2.91 (L) 4.10 - 5.70 M/uL    HGB 8.9 (L) 12.1 - 17.0 g/dL    HCT 27.7 (L) 36.6 - 50.3 %    MCV 95.2 80.0 - 99.0 FL    MCH 30.6 26.0 - 34.0 PG    MCHC 32.1 30.0 - 36.5 g/dL    RDW 15.0 (H) 11.5 - 14.5 %    PLATELET 420 (L) 425 - 400 K/uL    MPV 12.3 8.9 - 12.9 FL    NRBC 0.0 0.0  WBC    ABSOLUTE NRBC 0.00 0.00 - 0.01 K/uL    NEUTROPHILS 80 (H) 32 - 75 %    LYMPHOCYTES 8 (L) 12 - 49 %    MONOCYTES 11 5 - 13 %    EOSINOPHILS 0 0 - 7 %    BASOPHILS 0 0 - 1 %    IMMATURE GRANULOCYTES 1 (H) 0 - 0.5 %    ABS. NEUTROPHILS 10.2 (H) 1.8 - 8.0 K/UL    ABS. LYMPHOCYTES 1.0 0.8 - 3.5 K/UL    ABS. MONOCYTES 1.4 (H) 0.0 - 1.0 K/UL    ABS. EOSINOPHILS 0.0 0.0 - 0.4 K/UL    ABS. BASOPHILS 0.0 0.0 - 0.1 K/UL    ABS. IMM. GRANS. 0.1 (H) 0.00 - 0.04 K/UL    DF AUTOMATED     TROPONIN-HIGH SENSITIVITY    Collection Time: 07/12/22  6:24 AM   Result Value Ref Range    Troponin-High Sensitivity 40 0 - 76 ng/L   CK    Collection Time: 07/12/22  6:24 AM   Result Value Ref Range     (H) 39 - 308 U/L          Diagnostic Images:  CT Results  (Last 48 hours)               07/11/22 1319  CT HEAD WO CONT Final result    Impression:  No acute intracranial abnormality. There is a LEFT frontal approach  shunt   catheter in place with the tip to the LEFT of midline. Narrative:  EXAM: CT HEAD WO CONT       INDICATION: Fall       COMPARISON: CT head without contrast July 19, 2017. CONTRAST: None. TECHNIQUE: Unenhanced CT of the head was performed using 5 mm images. Brain and   bone windows were generated. Coronal and sagittal reformats.  CT dose reduction   was achieved through use of a standardized protocol tailored for this examination and automatic exposure control for dose modulation. FINDINGS:   The ventricles and sulci are normal in size, shape and configuration. . There is   a LEFT frontal approach shunt catheter in place with the tip to the LEFT of   midline. There is no intracranial hemorrhage, extra-axial collection, or mass   effect. The basilar cisterns are open. No CT evidence of acute infarct. The bone windows demonstrate no abnormalities. The visualized portions of the   paranasal sinuses and mastoid air cells are clear.           07/11/22 1319  CT SPINE CERV WO CONT Final result    Impression:      1. No evidence of acute fracture or subluxation of the cervical spine. 2. Moderate to severe degenerative disc disease at C5-C6 and C6-C7. There is   multilevel right-sided moderate to severe facet arthritis. Narrative:  EXAM:  CT CERVICAL SPINE WITHOUT CONTRAST       INDICATION: Fall. COMPARISON: None. CONTRAST:  None. TECHNIQUE: Multislice helical CT of the cervical spine was performed without   intravenous contrast administration. Sagittal and coronal reformats were   generated. CT dose reduction was achieved through use of a standardized   protocol tailored for this examination and automatic exposure control for dose   modulation. FINDINGS:       The alignment is within normal limits. There is no fracture or compression   deformity. The odontoid process is intact. The craniocervical junction is within   normal limits. Severe disc space narrowing and endplate degeneration are seen at   the C5-C6 and C6-C7 levels. There is bilateral, multilevel facet arthritis which   is worse on the RIGHT side. No bony canal narrowing is appreciated. The neck   soft tissues are grossly unremarkable. A left-sided  shunt catheter is noted. Emphysematous changes are seen at the lung apices. 07/11/22 1319  CT CHEST W CONT Final result    Impression:  1.   Comminuted intertrochanteric left femur fracture. Subacute-appearing right   11th and 12th rib fractures. Several Chronic-appearing left rib fractures. 2.  Fat stranding/edema/contusion in the left retroperitoneum. Suspected   intramuscular hematomas within the left iliopsoas and gluteal compartments. 3.  Right bladder mass, concerning for neoplasm. Nonemergent cystoscopy   recommended for further evaluation. 4.  Left upper lobe partially calcified nodule, not seen on prior exams. Attention on follow-up. 5.  Additional nonacute findings as above. Narrative:  EXAM: CT CHEST W CONT, CT ABD PELV W CONT       INDICATION:    Chest: Ground-level fall; diffuse torso pain, poor historian; rule out traumatic   injury   Abdomen pelvis: Abdominal pain       COMPARISON: CT chest 10/27/2008, CT abdomen and pelvis 6/6/2011. TECHNIQUE: Helical CT of the chest, abdomen, and pelvis following the uneventful   intravenous administration of 100 mL Isovue-370. Oral contrast was utilized. Coronal and sagittal reformats were generated. CT dose reduction was achieved   through use of a standardized protocol tailored for this examination and   automatic exposure control for dose modulation. FINDINGS:       THYROID: No nodule. MEDIASTINUM: No mass or lymphadenopathy. ES: No mass or lymphadenopathy. THORACIC AORTA: No aneurysm. Atherosclerosis. MAIN PULMONARY ARTERY: Normal in caliber. HEART: Cardiomegaly. Coronary artery calcifications. ESOPHAGUS: Patulous with fluid and debris. TRACHEA/BRONCHI: Patent. PLEURA: No effusion or pneumothorax. LUNGS: Emphysema. No acute airspace disease. 6 mm centrally calcified left upper   lobe nodule, not seen on prior exams. .       Liver: No mass or biliary dilatation. Biliary tree: The gallbladder is within normal limits. The CBD is not dilated. Spleen: Calcified granulomas. Pancreas: No inflammation, mass or ductal dilatation. Adrenals: Unremarkable. Kidneys: No mass or hydronephrosis. A 0.7 cm x 6.4 cm bilobed left renal cyst   with a few thin partially calcified septations (Bosniak 2). Several additional   bilateral renal cysts and subcentimeter hypodensities are too small to   accurately characterize; no dedicated follow-up recommended   Stomach: Unremarkable. Small bowel: No dilatation or wall thickening. Colon: No dilatation or wall thickening. Diverticulosis. Appendix: Normal   Peritoneum: No ascites or pneumoperitoneum. Retroperitoneum: No lymphadenopathy or aortic aneurysm. Atherosclerosis. Fat   stranding/edema/contusion in the left retroperitoneum. Suspected intramuscular   hematomas within the left iliopsoas and gluteal compartments. Reproductive organs: Prostatic brachytherapy seeds. Urinary bladder: No mass or calculus. Bones: Several chronic-appearing left-sided rib fracture deformities. Subacute-chronic appearing posterior right 11-12 rib fractures. Comminuted   intertrochanteric left femur fracture. Degenerative changes. Grade 1   anterolisthesis of L4 on L5. 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 Abdominal wall: 2.2 cm x 1.2 cm right posterior eccentric mass (501-120). Additional comments: N/A           07/11/22 1319  CT ABD PELV W CONT Final result    Impression:  1. Comminuted intertrochanteric left femur fracture. Subacute-appearing right   11th and 12th rib fractures. Several Chronic-appearing left rib fractures. 2.  Fat stranding/edema/contusion in the left retroperitoneum. Suspected   intramuscular hematomas within the left iliopsoas and gluteal compartments. 3.  Right bladder mass, concerning for neoplasm. Nonemergent cystoscopy   recommended for further evaluation. 4.  Left upper lobe partially calcified nodule, not seen on prior exams. Attention on follow-up. 5.  Additional nonacute findings as above.                Narrative:  EXAM: CT CHEST W CONT, CT ABD PELV W CONT       INDICATION: Chest: Ground-level fall; diffuse torso pain, poor historian; rule out traumatic   injury   Abdomen pelvis: Abdominal pain       COMPARISON: CT chest 10/27/2008, CT abdomen and pelvis 6/6/2011. TECHNIQUE: Helical CT of the chest, abdomen, and pelvis following the uneventful   intravenous administration of 100 mL Isovue-370. Oral contrast was utilized. Coronal and sagittal reformats were generated. CT dose reduction was achieved   through use of a standardized protocol tailored for this examination and   automatic exposure control for dose modulation. FINDINGS:       THYROID: No nodule. MEDIASTINUM: No mass or lymphadenopathy. ES: No mass or lymphadenopathy. THORACIC AORTA: No aneurysm. Atherosclerosis. MAIN PULMONARY ARTERY: Normal in caliber. HEART: Cardiomegaly. Coronary artery calcifications. ESOPHAGUS: Patulous with fluid and debris. TRACHEA/BRONCHI: Patent. PLEURA: No effusion or pneumothorax. LUNGS: Emphysema. No acute airspace disease. 6 mm centrally calcified left upper   lobe nodule, not seen on prior exams. .       Liver: No mass or biliary dilatation. Biliary tree: The gallbladder is within normal limits. The CBD is not dilated. Spleen: Calcified granulomas. Pancreas: No inflammation, mass or ductal dilatation. Adrenals: Unremarkable. Kidneys: No mass or hydronephrosis. A 0.7 cm x 6.4 cm bilobed left renal cyst   with a few thin partially calcified septations (Bosniak 2). Several additional   bilateral renal cysts and subcentimeter hypodensities are too small to   accurately characterize; no dedicated follow-up recommended   Stomach: Unremarkable. Small bowel: No dilatation or wall thickening. Colon: No dilatation or wall thickening. Diverticulosis. Appendix: Normal   Peritoneum: No ascites or pneumoperitoneum. Retroperitoneum: No lymphadenopathy or aortic aneurysm. Atherosclerosis. Fat   stranding/edema/contusion in the left retroperitoneum. Suspected intramuscular   hematomas within the left iliopsoas and gluteal compartments. Reproductive organs: Prostatic brachytherapy seeds. Urinary bladder: No mass or calculus. Bones: Several chronic-appearing left-sided rib fracture deformities. Subacute-chronic appearing posterior right 11-12 rib fractures. Comminuted   intertrochanteric left femur fracture. Degenerative changes. Grade 1   anterolisthesis of L4 on L5. 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 Abdominal wall: 2.2 cm x 1.2 cm right posterior eccentric mass (501-120).    Additional comments: N/A                 Current Medications:    Current Facility-Administered Medications:     [COMPLETED] piperacillin-tazobactam (ZOSYN) 4.5 g in 0.9% sodium chloride (MBP/ADV) 100 mL MBP, 4.5 g, IntraVENous, ONCE, Last Rate: 200 mL/hr at 07/11/22 1202, 4.5 g at 07/11/22 1202 **FOLLOWED BY** piperacillin-tazobactam (ZOSYN) 3.375 g in 0.9% sodium chloride (MBP/ADV) 100 mL MBP, 3.375 g, IntraVENous, Q8H, Odin He, DO, Last Rate: 25 mL/hr at 07/12/22 1101, 3.375 g at 07/12/22 1101    sodium chloride (NS) flush 5-40 mL, 5-40 mL, IntraVENous, Q8H, Taisha Petersen MD, 10 mL at 07/11/22 1643    sodium chloride (NS) flush 5-40 mL, 5-40 mL, IntraVENous, PRN, Dany Petersen MD    acetaminophen (TYLENOL) tablet 650 mg, 650 mg, Oral, Q6H PRN **OR** acetaminophen (TYLENOL) suppository 650 mg, 650 mg, Rectal, Q6H PRN, Dany Petersen MD    polyethylene glycol [facility]) packet 17 g, 17 g, Oral, DAILY PRN, Dany Petersen MD    ondansetron (ZOFRAN ODT) tablet 4 mg, 4 mg, Oral, Q8H PRN **OR** ondansetron (ZOFRAN) injection 4 mg, 4 mg, IntraVENous, Q6H PRN, Dany Petersen MD    budesonide-formoteroL [facility]) 160-4.5 mcg/actuation HFA inhaler 2 Puff, 2 Puff, Inhalation, BID, Rola Barclay MD, 2 Puff at 07/12/22 7030    0.9% sodium chloride infusion, 100 mL/hr, IntraVENous, CONTINUOUS, Rola Barclay MD, Last Rate: 100 mL/hr at 07/11/22 6062, 100 mL/hr at 07/11/22 1825    albuterol-ipratropium (DUO-NEB) 2.5 MG-0.5 MG/3 ML, 3 mL, Nebulization, Q4H PRN, Linwood Fallon MD    celecoxib (CELEBREX) capsule 100 mg, 100 mg, Oral, BID, Chaz Gross PA-C, 100 mg at 07/12/22 0916    [START ON 7/13/2022] levoFLOXacin (LEVAQUIN) 750 mg in D5W IVPB, 750 mg, IntraVENous, Q48H, Dany Garnica MD       ASSESSMENT:  1) Ground level fall at home with left hip fracture and right 11-12 rib fractures.   Noted on cxr   Continue supportive care  Orthopedics on board for anticipated operative management   Zosyn and levofloxacin started during the ED course  Surgery today  Prn pain management     2) Cardiovascular issues including PAD, hypertension. Telemetry monitoring  Echocardiogram pending     3) Rhabdomyolysis in the setting of fall, further complicated by dehydration. Continue Supportive care  Gentle Intravenous fluids  Trend CK     4) COPD. Appears stable, no exacerbation  Bronchodilators as needed          Full Code  Dvt Prophylaxis none  GI Prophylaxis none  Discharge barriers:  Surgery today  Pt/ot once cleared by ortho  Prn pain management      Above treatment plan reviewed and discussed with patient in detail at bedside, all questions answered. Care Plan discussed with: Interdisciplinary team    Total time spent with patient: 35 minutes.     Gabriel Basilio NP

## 2022-07-12 NOTE — ANESTHESIA POSTPROCEDURE EVALUATION
Procedure(s): FEMUR GAMMA NAIL INSERTION.     spinal    Anesthesia Post Evaluation      Multimodal analgesia: multimodal analgesia used between 6 hours prior to anesthesia start to PACU discharge  Patient location during evaluation: PACU  Patient participation: complete - patient participated  Level of consciousness: awake  Pain score: 0  Pain management: adequate  Airway patency: patent  Anesthetic complications: no  Cardiovascular status: acceptable  Respiratory status: acceptable  Hydration status: acceptable  Post anesthesia nausea and vomiting:  controlled  Final Post Anesthesia Temperature Assessment:  Normothermia (36.0-37.5 degrees C)      INITIAL Post-op Vital signs:   Vitals Value Taken Time   /54 07/12/22 1750   Temp 36.9 °C (98.4 °F) 07/12/22 1738   Pulse 82 07/12/22 1750   Resp 17 07/12/22 1750   SpO2 95 % 07/12/22 1750

## 2022-07-12 NOTE — OP NOTES
Operative Note    Patient: Eliana Lucero  YOB: 1931  MRN: [id number]    Date of Procedure: 7/12/2022     Pre-Op Diagnosis: Displaced 3 part intertrochanteric fracture of left proximal femur    Post-Op Diagnosis: Same as preoperative diagnosis. Procedure(s):  Open reduction and internal fixation of left proximal femur intertrochanteric fracture, with Gamma Nail intramedullary device    Surgeon(s):  Cinthia Teague MD    Surgical Assistant: Surg Asst-1: Bebeto Velasquez    Anesthesia: General by Ron [place]    Estimated Blood Loss (mL):  less than 50     Complications: None    Specimens: * No specimens in log *     Implants: From [employer] gamma nail system  1) 125° x 11 mm x 180 mm short gamma nail  2) 100 mm sliding hip screw  3) setscrew placed in dynamic mode  4) 40 mm static distal locking screw  Implant Name Type Inv. Item Serial No.  Lot No. LRB No. Used Action   NAIL KT TROCH 125D [id number] TI -- STRL GAMMA 3 - S   NAIL KT TROCH 125D [id number] TI -- STRL GAMMA 3   [employer] [id number] Left 1 Implanted   SCR BNE LAG GAMMA 3 [id number] -- TI STRL - S   SCR BNE LAG GAMMA 3 [id number] -- TI STRL   [employer] [id number] Left 1 Implanted   SCR BNE LCK T2 FT [id number] TI -- STRL - S   SCR BNE LCK T2 FT [id number] TI -- STRL   [employer] [id number] Left 1 Implanted       Drains: * No LDAs found *     Preoperative note: The patient is a 40-year-old gentleman who presented with the above noted injury at the [facility] emergency room yesterday. He was admitted the medical service, and underwent medical evaluation and clearance. The patient and family were counseled about the nature of the injury, its natural history and treatment options.  They wished to proceed with open repair of the fracture with internal fixation, with the goal of facilitating mobility and early ambulation, and to mitigate the complications of prolonged recumbency and fracture instability. Findings: Stable near anatomic reduction obtained, with satisfactory hardware placement and fixation. Detailed Description of Procedure: The patient and operative site were identified in the preoperative holding area. After induction of anesthesia the patient was positioned supine on the fracture table. Intravenous cefazolin were administered. Fluoroscopy was positioned appropriately. Fracture reduction was obtained with traction, and manipulation with rotation. Satisfactory alignment was noted. Standard prepping and draping of the left hip was performed into a surgical field. After timeout was called, I used biplanar fluoroscopy to localize the tip of the greater trochanter, and made a short incision proximal and lateral to it. The deep fascia was divided in line with the incision. The greater trochanter was localized. A cannulated trochanteric awl was now introduced into the proximal femur with biplanar fluoroscopic assistance, after accurately optimizing the entry point. The guidewire was inserted into the femoral canal, and a one-step 15 mm reamer was passed to the lesser trochanter. The chosen intramedullary device was now passed over the guidewire and fine-tuned in position. The limb was slightly abducted, to restore and maintain an optimal neck-shaft angle. The guidewire was inserted into the femoral head, and accurate positioning was performed. Cannulated reaming was performed, followed by insertion of the 100 mm sliding screw. The traction was released and interfragmentary compression was obtained with the tensioning bolt. The setscrew was now inserted in the dynamic mode. This was followed by standard distal static locking of the implant with the aiming device. The insertion handle was disengaged and fluoroscopic images were obtained and saved, confirming near anatomic fracture reduction as well as satisfactory implant placement.   The wound was thoroughly irrigated and closure was performed in layers. Deep fascia, subcutaneous and skin closure was performed by first assistant Ivanna Christianson, along with infiltration of soft tissues with local anesthetic. Sterile dressings were applied. The patient tolerated the procedure well and was transferred to the recovery room in stable condition. Postoperatively, the patient will be allowed full weightbearing as tolerated, with a standard hip fracture rehabilitation protocol.     Electronically Signed by Karolyn Alonso MD on 7/12/2022 at 5:25 PM

## 2022-07-13 ENCOUNTER — APPOINTMENT (OUTPATIENT)
Dept: NON INVASIVE DIAGNOSTICS | Age: 87
DRG: 956 | End: 2022-07-13
Attending: ORTHOPAEDIC SURGERY
Payer: MEDICARE

## 2022-07-13 LAB
ANION GAP SERPL CALC-SCNC: 7 MMOL/L (ref 5–15)
BASOPHILS # BLD: 0 K/UL (ref 0–0.1)
BASOPHILS NFR BLD: 0 % (ref 0–1)
BUN SERPL-MCNC: 42 MG/DL (ref 6–20)
BUN/CREAT SERPL: 39 (ref 12–20)
CA-I BLD-MCNC: 8 MG/DL (ref 8.5–10.1)
CHLORIDE SERPL-SCNC: 114 MMOL/L (ref 97–108)
CK SERPL-CCNC: 442 U/L (ref 39–308)
CO2 SERPL-SCNC: 23 MMOL/L (ref 21–32)
CREAT SERPL-MCNC: 1.09 MG/DL (ref 0.7–1.3)
DIFFERENTIAL METHOD BLD: ABNORMAL
EOSINOPHIL # BLD: 0 K/UL (ref 0–0.4)
EOSINOPHIL NFR BLD: 0 % (ref 0–7)
ERYTHROCYTE [DISTWIDTH] IN BLOOD BY AUTOMATED COUNT: 15 % (ref 11.5–14.5)
GLUCOSE SERPL-MCNC: 109 MG/DL (ref 65–100)
HCT VFR BLD AUTO: 26.4 % (ref 36.6–50.3)
HGB BLD-MCNC: 8.3 G/DL (ref 12.1–17)
IMM GRANULOCYTES # BLD AUTO: 0.1 K/UL (ref 0–0.04)
IMM GRANULOCYTES NFR BLD AUTO: 1 % (ref 0–0.5)
LYMPHOCYTES # BLD: 1 K/UL (ref 0.8–3.5)
LYMPHOCYTES NFR BLD: 7 % (ref 12–49)
MCH RBC QN AUTO: 30.4 PG (ref 26–34)
MCHC RBC AUTO-ENTMCNC: 31.4 G/DL (ref 30–36.5)
MCV RBC AUTO: 96.7 FL (ref 80–99)
MONOCYTES # BLD: 1.7 K/UL (ref 0–1)
MONOCYTES NFR BLD: 13 % (ref 5–13)
NEUTS SEG # BLD: 10.5 K/UL (ref 1.8–8)
NEUTS SEG NFR BLD: 79 % (ref 32–75)
NRBC # BLD: 0 K/UL (ref 0–0.01)
NRBC BLD-RTO: 0 PER 100 WBC
PLATELET # BLD AUTO: 161 K/UL (ref 150–400)
PMV BLD AUTO: 11.6 FL (ref 8.9–12.9)
POTASSIUM SERPL-SCNC: 4.1 MMOL/L (ref 3.5–5.1)
RBC # BLD AUTO: 2.73 M/UL (ref 4.1–5.7)
SODIUM SERPL-SCNC: 144 MMOL/L (ref 136–145)
WBC # BLD AUTO: 13.2 K/UL (ref 4.1–11.1)

## 2022-07-13 PROCEDURE — 74011250636 HC RX REV CODE- 250/636: Performed by: ORTHOPAEDIC SURGERY

## 2022-07-13 PROCEDURE — 97162 PT EVAL MOD COMPLEX 30 MIN: CPT

## 2022-07-13 PROCEDURE — 80048 BASIC METABOLIC PNL TOTAL CA: CPT

## 2022-07-13 PROCEDURE — 97165 OT EVAL LOW COMPLEX 30 MIN: CPT

## 2022-07-13 PROCEDURE — 93306 TTE W/DOPPLER COMPLETE: CPT

## 2022-07-13 PROCEDURE — 82550 ASSAY OF CK (CPK): CPT

## 2022-07-13 PROCEDURE — 94640 AIRWAY INHALATION TREATMENT: CPT

## 2022-07-13 PROCEDURE — 36415 COLL VENOUS BLD VENIPUNCTURE: CPT

## 2022-07-13 PROCEDURE — 65270000029 HC RM PRIVATE

## 2022-07-13 PROCEDURE — 92526 ORAL FUNCTION THERAPY: CPT

## 2022-07-13 PROCEDURE — 74011250637 HC RX REV CODE- 250/637: Performed by: ORTHOPAEDIC SURGERY

## 2022-07-13 PROCEDURE — 97530 THERAPEUTIC ACTIVITIES: CPT

## 2022-07-13 PROCEDURE — 85025 COMPLETE CBC W/AUTO DIFF WBC: CPT

## 2022-07-13 PROCEDURE — 74011000258 HC RX REV CODE- 258: Performed by: ORTHOPAEDIC SURGERY

## 2022-07-13 PROCEDURE — 51798 US URINE CAPACITY MEASURE: CPT

## 2022-07-13 RX ADMIN — PIPERACILLIN AND TAZOBACTAM 3.38 G: 3; .375 INJECTION, POWDER, LYOPHILIZED, FOR SOLUTION INTRAVENOUS at 17:21

## 2022-07-13 RX ADMIN — PIPERACILLIN AND TAZOBACTAM 3.38 G: 3; .375 INJECTION, POWDER, LYOPHILIZED, FOR SOLUTION INTRAVENOUS at 03:12

## 2022-07-13 RX ADMIN — BUDESONIDE AND FORMOTEROL FUMARATE DIHYDRATE 2 PUFF: 160; 4.5 AEROSOL RESPIRATORY (INHALATION) at 21:00

## 2022-07-13 RX ADMIN — ACETAMINOPHEN 1000 MG: 500 TABLET ORAL at 12:23

## 2022-07-13 RX ADMIN — ASPIRIN 325 MG: 325 TABLET, COATED ORAL at 09:49

## 2022-07-13 RX ADMIN — ACETAMINOPHEN 1000 MG: 500 TABLET ORAL at 23:21

## 2022-07-13 RX ADMIN — ASPIRIN 325 MG: 325 TABLET, COATED ORAL at 20:49

## 2022-07-13 RX ADMIN — CELECOXIB 100 MG: 100 CAPSULE ORAL at 20:49

## 2022-07-13 RX ADMIN — LEVOFLOXACIN 750 MG: 5 INJECTION, SOLUTION INTRAVENOUS at 15:49

## 2022-07-13 RX ADMIN — BUDESONIDE AND FORMOTEROL FUMARATE DIHYDRATE 2 PUFF: 160; 4.5 AEROSOL RESPIRATORY (INHALATION) at 07:48

## 2022-07-13 RX ADMIN — PIPERACILLIN AND TAZOBACTAM 3.38 G: 3; .375 INJECTION, POWDER, LYOPHILIZED, FOR SOLUTION INTRAVENOUS at 09:49

## 2022-07-13 RX ADMIN — CELECOXIB 100 MG: 100 CAPSULE ORAL at 09:49

## 2022-07-13 RX ADMIN — ACETAMINOPHEN 1000 MG: 500 TABLET ORAL at 05:13

## 2022-07-13 NOTE — PROGRESS NOTES
SPEECH LANGUAGE PATHOLOGY DYSPHAGIA TREATMENT  Patient: Eliana Lucero (71 y.o. male)  Date: 7/13/2022  Diagnosis: Fracture, hip ([facility] [address] [S72.009A]  Rhabdomyolysis [M62.82] Fracture, hip (HCC)  Procedure(s) (LRB):  FEMUR GAMMA NAIL INSERTION (Left) 1 Day Post-Op  Precautions: aspiration     ASSESSMENT:  Patient seen at chairside in room. Patient s/p surgery Day 1. Patient placed on regular diet after surgery. Recommendations for soft and bite size diet on day of evaluation. Patient ingested thin liquids via straw. Swallow initiation timely, HLE and protraction adequate to digital palpation. Patient w/ x1 clinical indicator of penetration/aspiration c/b cough response after the swallow. No other clinical indicators of pen/asp w/ thins noted. Mastication of solid c/b reduced bolus awareness and formation w/ oral spreading. Patient talking w/ cracker in mouth. No pharyngeal difficulty noted. Educated patient to small, controlled sips w/ thin. Recommend to continue w/ evaluation diet of soft and bite size and thin liquids. PLAN:  Recommendations and Planned Interventions:  Soft and bite size, thin liquids. Aspiration precautions. Assist w/ intake. MBS as indicated. This may be patients LRD at this time. Patient continues to benefit from skilled intervention to address the above impairments. Continue treatment per established plan of care. Frequency/Duration: Patient will be followed by speech-language pathology 3 times a week to address goals. Discharge Recommendations:  Inpatient Rehab     SUBJECTIVE:   Patient alert and seen at chairside in room.      OBJECTIVE:     CXR Results  (Last 48 hours)      None          CT Results  (Last 48 hours)      None           Cognitive and Communication Status:  Neurologic State: Alert  Orientation Level: Oriented to person,Oriented to place  Cognition: Decreased attention/concentration        Safety/Judgement: Decreased awareness of need for safety      Pain:  Pain Scale 1: Numeric (0 - 10)  Pain Intensity 1: 0       After treatment:   Patient left in no apparent distress sitting up in chair, Call bell within reach, and Nursing notified    COMMUNICATION/EDUCATION:   Patient was educated regarding his deficit(s) of dysphagia, swallow safety precautions, diet recs and POC. He demonstrated Fair understanding as evidenced by impaired cognition. The patient's plan of care including recommendations, planned interventions, and recommended diet changes were discussed with: Registered nurse. FABIENNE Scott M.S. CCC-SLP  Time Calculation: 17 mins           Problem: Dysphagia (Adult)  Goal: *Acute Goals and Plan of Care (Insert Text)  Description: Speech Therapy Swallow Goals  Initiated 7/11/2022  -Patient will tolerate SBS diet with thin liquids without clinical indicators of aspiration given minimal cues within 5-7day(s). -Patient will tolerate PO trials without clinical indicators of aspiration given minimal cues within 5-7day(s). -Patient will participate in modified barium swallow study within 5-7 day(s). -Patient will demonstrate understanding of swallow safety precautions and aspiration precautions, diet recs with minimal cues within 5-7 day(s). -Patient/caregiver goal: \"to eat safely. \"          Outcome: Progressing Towards Goal

## 2022-07-13 NOTE — WOUND CARE
IP WOUND CONSULT    [address] RECORD NUMBER:  [id number]  AGE: 80 y.o. GENDER: male  : 1931  TODAY'S DATE:  2022    GENERAL     [] Follow-up   [x] New Consult          PAST MEDICAL HISTORY    Past Medical History:   Diagnosis Date    Arthritis     Asthma     Burning with urination     Cancer [facility])     Prostate Finished radiation 3/2012    COPD     Essential tremor     GERD (gastroesophageal reflux disease)     Hyperlipidemia     Hypertension     PAD (peripheral artery disease) (HCC)         PAST SURGICAL HISTORY    Past Surgical History:   Procedure Laterality Date    HC STNT SFA VIBN WLGO -H1  2005    LEFT Superficial Femoral Arter Stent    HX ENDOSCOPY      HX GI      Bleeding ulcers    HX HEART CATHETERIZATION      HX HEENT      L eye    HX HEENT      Cataract    HX ORTHOPAEDIC      R & L Carpal tunnel    HX OTHER SURGICAL      Barrets esophagus    HX UROLOGICAL  2011    Prostate Bx    HX UROLOGICAL      Brachytherapy    HX UROLOGICAL  2011    cysto, BRP       ALLERGIES    Allergies   Allergen Reactions    Nut - Unspecified Not Reported This Time       MEDICATIONS    No current facility-administered medications on file prior to encounter. Current Outpatient Medications on File Prior to Encounter   Medication Sig Dispense Refill    topiramate (TOPAMAX) 50 mg tablet Take  by mouth two (2) times a day.  ezetimibe (Zetia) 10 mg tablet Take  by mouth.  mometasone (NASONEX) 50 mcg/actuation nasal spray 2 Sprays daily.  candesartan (ATACAND) 8 mg tablet Take  by mouth daily.  psyllium (METAMUCIL SMOOTH TEXTURE) packet Take 1 Packet by mouth daily.  cetirizine (ZYRTEC) 10 mg tablet Take  by mouth daily.  gabapentin (NEURONTIN) 300 mg capsule Take 100 mg by mouth nightly.  omega-3 fatty acids-vitamin e (FISH OIL) 1,000 mg Cap Take 1 Cap by mouth.       fluticasone-salmeterol (ADVAIR DISKUS) 250-50 mcg/Dose diskus inhaler Take 1 Puff by inhalation every twelve (12) hours.  simvastatin (ZOCOR) 40 mg tablet Take  by mouth nightly.  alfuzosin (UROXATRAL) 10 mg SR tablet Take  by mouth daily.  montelukast (SINGULAIR) 10 mg tablet Take 10 mg by mouth daily.  esomeprazole (NEXIUM) 40 mg capsule Take  by mouth daily.  albuterol (PROVENTIL VENTOLIN) 2.5 mg /3 mL (0.083 %) nebulizer solution by Nebulization route once.  hydrocortisone (HYCORT) 1 % ointment Apply  to affected area two (2) times a day. use thin layer       aspirin delayed-release 81 mg tablet Take 81 mg by mouth daily.  Calcium Carbonate-Vit D3-Min (CALTRATE 600+D PLUS MINERALS) 600 mg (1,500 mg)-400 unit Chew Take  by mouth.  MV-Min-Folic Acid-Lutein (CENTRUM SILVER) 500-250 mcg Chew Take  by mouth. [unfilled]  Visit Vitals  /63 (BP 1 Location: Left upper arm, BP Patient Position: At rest;Supine)   Pulse 81   Temp 97.9 °F (36.6 °C)   Resp 20   Ht 5' 10\" (1.778 m)   Wt 74.7 kg (164 lb 10.9 oz)   SpO2 91%   BMI 23.63 kg/m²       ASSESSMENT   Moderate assists in repositioning; Patient repositioned in recliner. Continent. Trung 15  Blood Glucose: Albumin:  WBCs:    Nutritionist Consulted:   Nutrition Status:    Support Surface: Foam bed    Contributing Factors: anticoagulation therapy, decreased mobility and shear force    Wound Axilla Right skin tear noted with serous drainage present (Active)   Wound Etiology Skin Tear 07/13/22 1040   Dressing Status New dressing applied 07/13/22 1040   Cleansed Cleansed with saline 07/13/22 1040   Dressing/Treatment Xeroform; Foam 07/13/22 1040   Wound Assessment Pink/red 07/13/22 1040   Drainage Amount None 07/13/22 1040   Wound Odor None 07/13/22 1040   Nikole-Wound/Incision Assessment Fragile 07/13/22 1040   Edges Flat/open edges 07/13/22 1040   Number of days: 2       Wound Elbow Right;Posterior (Active)   Wound Etiology Skin Tear 07/13/22 1041   Dressing Status New dressing applied 07/13/22 1041   Dressing/Treatment Xeroform; Foam 07/13/22 1041   Wound Assessment Pink/red 07/13/22 1041   Drainage Amount Scant 07/13/22 1041   Drainage Description Serosanguinous 07/13/22 1041   Wound Odor None 07/13/22 1041   Nikole-Wound/Incision Assessment Fragile 07/13/22 1041   Edges Flat/open edges 07/13/22 1041   Number of days: 2       Wound Sacral/coccyx non-blancable area noted (Active)   Wound Etiology Pressure Stage 1 07/13/22 1042   Dressing Status New dressing applied 07/13/22 1042   Cleansed Cleansed with saline 07/13/22 1042   Dressing/Treatment Foam 07/13/22 1042   Wound Assessment Non-blanchable erythema 07/13/22 1042   Drainage Amount None 07/13/22 1042   Wound Odor None 07/13/22 1042   Nikole-Wound/Incision Assessment Non-Blanchable erythema 07/13/22 1042   Edges Attached edges 07/13/22 1042   Number of days: 2       Wound Knee Anterior;Right skin tear noted to right knee with no drainage noted; wound bed pink (Active)   Wound Etiology Skin Tear 07/13/22 1040   Dressing Status New dressing applied 07/13/22 1040   Cleansed Cleansed with saline 07/13/22 1040   Dressing/Treatment Xeroform; Foam 07/13/22 1040   Wound Assessment Pink/red 07/13/22 1040   Drainage Amount Scant 07/13/22 1040   Drainage Description Serosanguinous 07/13/22 1040   Wound Odor None 07/13/22 1040   Nikole-Wound/Incision Assessment Fragile 07/13/22 1040   Edges Flat/open edges 07/13/22 1040   Number of days: 2       Wound Knee Anterior; Left skin tear noted to left knee with redness noted to wound bed and no drainage present (Active)   Wound Etiology Skin Tear 07/13/22 1041   Dressing Status New dressing applied 07/13/22 1041   Cleansed Cleansed with saline 07/13/22 1041   Dressing/Treatment Xeroform; Foam 07/13/22 1041   Wound Assessment Pink/red 07/13/22 1041   Drainage Amount Scant 07/13/22 1041   Drainage Description Serosanguinous 07/13/22 1041   Wound Odor None 07/13/22 1041 Nikole-Wound/Incision Assessment Fragile 07/13/22 1041   Edges Flat/open edges 07/13/22 1041   Number of days: 2       Incision 07/12/22 Hip Left (Active)   Dressing Status New drainage noted; Reinforced dressing 07/13/22 0959   Dressing/Treatment Gauze dressing/dressing sponge;Tape/Soft cloth adhesive tape 07/13/22 0744   Drainage Amount Scant 07/13/22 0744   Drainage Description Sanguineous 07/13/22 0959   Wound Odor None 07/13/22 0744   Number of days: 1          PLAN     Skin Care & Pressure Relief Recommendations  Minimize layers of linen  Pads under patient to optimize support surface  Turn/reposition approximately every 2 hours  Pillow wedges  Manage incontinence   Offload heels pillows    Physician/Provider notified:   Recommendations: Patient has multiple skin tears from fall at home, all areas are pink/red with minimal drainage. Areas cleansed with NS and xeroform applied and covered with foam dressing. Dressings to be changed every other day and PRN for soiling. Patient has non-blanchable erythema noted to buttocks, there is an area to right buttocks that is darker but believe it may be due to bruising from recent fall. Sacral foam dressing applied for protection, dressing to be changed every other day and PRN for soiling. Nurse to peel back sacral dressing daily for skin assessment. Please re-consult [facility] for any changes in skin condition.      Discharge Wound Care Needs:    Teaching completed with:   [] Patient           [] Family member       [] Caregiver          [] Nursing  [] Other    Patient/Caregiver Teaching:  Level of patient/caregiver understanding able to:   [] Indicates understanding       [] Needs reinforcement  [] Unsuccessful      [] Verbal Understanding  [] Demonstrated understanding       [] No evidence of learning  [] Refused teaching         [] N/A       Adore Jama RN, BSN  [facility] [place]   07/13/22  10:44 AM

## 2022-07-13 NOTE — ROUTINE PROCESS
Bedside shift change report given to wilfredo rn (oncoming nurse) by Keely Santana rn(offgoing nurse). Report included the following information SBAR.

## 2022-07-13 NOTE — PROGRESS NOTES
Rachel Emerson with [facility] informed CM that they will accept patient when medically cleared and authorization is not needed. CM will follow for medical clearance.

## 2022-07-13 NOTE — PROGRESS NOTES
Bedside shift change report given to Leidy RAO (oncoming nurse) by Maya Carlton (offgoing nurse). Report included the following information SBAR.

## 2022-07-13 NOTE — PROGRESS NOTES
OCCUPATIONAL THERAPY EVALUATION  Patient: Becca Burch (52 y.o. male)  Date: 7/13/2022  Primary Diagnosis: Fracture, hip ([facility] [address] [S72.009A]  Rhabdomyolysis [M62.82]  Procedure(s) (LRB):  FEMUR GAMMA NAIL INSERTION (Left) 1 Day Post-Op   Precautions: ortho, Ax2, WBAT LLE       ASSESSMENT  Pt is a 79 y/o M with PMH of HLD, PAD, COPD, prostate cancer (finished radiation 03/2012), arthritis, asthma, HTN, GERD, essential tremor, dementia, and Parkinson's disease presenting to [facility] with c/o a fall, admitted 07/11/22 and being treated for rhabdomyolysis, L hip fracture, R 11-12 rib fractures, PAD, HTN, and COPD. Pt currently s/p ORIF of L proximal femur intertrochanteric fracture completed by Dr. Corrina Diaz on 07/12/22. Pt is WBAT LLE. Pt noted with swelling to the RUE with nsg reporting it is caused by his IV that infiltrated the day prior. Pt received semi-supine in bed upon OT arrival, AXO x4 using the board to orient to time, and agreeable to OT/PT evaluation at this time. Per pt report, pt lives alone in a one-story home with a caregiver visiting 5x/week from 12-5PM and pt's family checking in with him through a webcam, was mostly IND with ADLs and Mod I using RW for mobility at [facility]. Based on current observations, pt presents with deficits in generalized strength/AROM, balance, functional activity tolerance, cognition, and increased pain impacting overall performance of ADLs and functional transfers/mobility. Pt dons socks while supine with total A. Pt currently requires mod A with additional time for sup>sit EOB. Pt requires mod Ax2 for scooting toward EOB. Pt completes sit EOB>stand with RW. Pt completes stand pivot transfer using RW with mod Ax2 and assist to maneuver walker and maintain balance during transfer. Pt educated to stand with a wider base of support. Pt verbalized understanding. Pt completes stand with RW>sit in recliner chair. Pt educated to reach back for chair when completing transfer.  Pt demonstrated understanding. Once seated in chair with feet elevated, pt set up with breakfast tray and required total A to open containers and mod I for utensil management, food>mouth, and drink>mouth. Overall, pt tolerates session fair. Pt would benefit from continued skilled OT services to address current impairments and improve IND and safety with self cares and functional transfers/mobility. Recommend discharge to IRF once medically appropriate. Other factors to consider for discharge: home support, PLOF, severity of deficits     Patient will benefit from skilled therapy intervention to address the above noted impairments. PLAN :  Recommendations and Planned Interventions: self care training, functional mobility training, therapeutic exercise, balance training, therapeutic activities, endurance activities, patient education and home safety training    Frequency/Duration: Patient will be followed by occupational therapy:  3-5x/week to address goals. Recommendation for discharge: (in order for the patient to meet his/her long term goals)  [address] [facility]     This discharge recommendation:  Has been made in collaboration with the attending provider and/or case management    IF patient discharges home will need the following DME: TBD       SUBJECTIVE:   Patient stated I can try.     OBJECTIVE DATA SUMMARY:   HISTORY:   Past Medical History:   Diagnosis Date    Arthritis     Asthma     Burning with urination     Cancer [facility])     Prostate Finished radiation 3/2012    COPD     Essential tremor     GERD (gastroesophageal reflux disease)     Hyperlipidemia     Hypertension     PAD (peripheral artery disease) ([facility])      Past Surgical History:   Procedure Laterality Date    HC STNT SFA VIBN WLGO -H1  12/12/2005    LEFT Superficial Femoral Arter Stent    HX ENDOSCOPY      HX GI      Bleeding ulcers    HX HEART CATHETERIZATION      HX HEENT      L eye    HX HEENT      Cataract  HX ORTHOPAEDIC      R & L Carpal tunnel    HX OTHER SURGICAL      Barrets esophagus    HX UROLOGICAL  05/11/2011    Prostate Bx    HX UROLOGICAL      Brachytherapy    HX UROLOGICAL  05/11/2011    cysto, BRP       Expanded or extensive additional review of patient history:     Home Situation  Home Environment: Private residence  # Steps to Enter: 3  One/Two Story Residence: One story  Living Alone: Yes  Support Systems: Caregiver/Home Care Staff,Other Family Member(s) (Caregiver 5x/week from 12-5 PM, Family checks in w/ webcam)  Patient Expects to be Discharged to[de-identified] Rehab Unit Subacute  Current DME Used/Available at Home: Walker, rolling    Hand dominance: Right    EXAMINATION OF PERFORMANCE DEFICITS:  Cognitive/Behavioral Status:  Neurologic State: Alert  Orientation Level: Oriented to person;Oriented to place  Cognition: Decreased attention/concentration        Safety/Judgement: Decreased awareness of need for safety    Hearing: Auditory  Auditory Impairment: Hard of hearing, left side,Hard of hearing, right side,Hearing aid(s)  Hearing Aids/Status: Other (comment) (one hearing aid in belongings)    Range of Motion:  AROM: Generally decreased, functional    Strength:  Strength: Generally decreased, functional    Coordination:  Coordination: Generally decreased, functional  Fine Motor Skills-Upper: Left Intact; Right Intact    Gross Motor Skills-Upper: Left Intact; Right Intact    Tone & Sensation:  Tone: Normal    Balance:  Sitting: Impaired; Without support  Sitting - Static: Good (unsupported)  Sitting - Dynamic: Fair (occasional)  Standing: Impaired; With support  Standing - Static: Constant support;Poor  Standing - Dynamic : Constant support;Poor    Functional Mobility and Transfers for ADLs:  Bed Mobility:  Supine to Sit: Moderate assistance; Additional time  Scooting: Moderate assistance;Assist x2    Transfers:  Sit to Stand:  Moderate assistance;Assist x2  Stand to Sit: Moderate assistance;Assist x2  Bed to Chair: Moderate assistance;Assist x2    ADL Intervention and task modifications:  Feeding  Container Management: Total assistance (dependent)  Utensil Management: Modified independent  Food to Mouth: Modified independent  Drink to Mouth: Modified independent    Lower Body Dressing Assistance  Socks: Total assistance (dependent)  Position Performed: Supine    Cognitive Retraining  Safety/Judgement: Decreased awareness of need for safety    Therapeutic Exercise:  Pt would benefit from UE HEP initiated at next session in prep for ADL's and functional transfers/mobility. Functional Measure:    Carvel Else AM-PACTM \"6 Clicks\"                                                       Daily Activity Inpatient Short Form  How much help from another person does the patient currently need. .. Total; A Lot A Little None   1. Putting on and taking off regular lower body clothing? []  1 [x]  2 []  3 []  4   2. Bathing (including washing, rinsing, drying)? []  1 [x]  2 []  3 []  4   3. Toileting, which includes using toilet, bedpan or urinal? [] 1 [x]  2 []  3 []  4   4. Putting on and taking off regular upper body clothing? []  1 []  2 [x]  3 []  4   5. Taking care of personal grooming such as brushing teeth? []  1 []  2 [x]  3 []  4   6. Eating meals? []  1 []  2 [x]  3 []  4   © 2007, Trustees of [employer], under license to [employer]. All rights reserved     Score: 15/24     Interpretation of Tool:  Represents clinically-significant functional categories (i.e. Activities of daily living).   Percentage of Impairment CH    0%   CI    1-19% CJ    20-39% CK    40-59% CL    60-79% CM    80-99% CN     100%   [facility]  Score 6-24 24 23 20-22 15-19 10-14 7-9 6         Occupational Therapy Evaluation Charge Determination   History Examination Decision-Making   LOW Complexity : Brief history review  LOW Complexity : 1-3 performance deficits relating to physical, cognitive , or psychosocial skils that result in activity limitations and / or participation restrictions  LOW Complexity : No comorbidities that affect functional and no verbal or physical assistance needed to complete eval tasks       Based on the above components, the patient evaluation is determined to be of the following complexity level: LOW   Pain Rating:  Pt reported pain in L hip during stand pivot transfer, did not rate on 1-10 scale    Activity Tolerance:   Fair and requires rest breaks  Please refer to the flowsheet for vital signs taken during this treatment. After treatment patient left in no apparent distress:    Sitting in chair, Call bell within reach and eating breakfast with nsg present    COMMUNICATION/EDUCATION:   The patients plan of care was discussed with: Physical therapist and Registered nurse. Patient/family agree to work toward stated goals and plan of care. This patients plan of care is appropriate for delegation to [facility]. PT/OT sessions occurred together for increased safety of pt and clinician. Thank you for this referral.  Alpa Nguyễn OT  Time Calculation: 52 mins    Problem: Self Care Deficits Care Plan (Adult)  Goal: *Acute Goals and Plan of Care (Insert Text)  Description: Patient stated goal: Walk to the bathroom. 1. Pt will be mod I sup <> sit in prep for EOB ADLs  2. Pt will be mod I grooming sitting EOB  3. Pt will be mod I LE dressing sitting EOB/long sit  4. Pt will be mod I sit <>  prep for toileting LRAD  5. Pt will be mod I toileting/toilet transfer/cloth mgmt LRAD  6.  Pt will be IND following UE HEP in prep for self care tasks      Outcome: Not Met

## 2022-07-13 NOTE — CONSULTS
Rehab Consult    Patient: Melquiades Hall MRN: [id number]  SSN: [id number]    YOB: 1931  Age: 80 y.o. Sex: male      Consulting provider: [address]  Reason for consult: Evaluate for rehab needs     Admit date: 7/11/2022  LOS (days): 2    CC: Difficulty walking     Subjective: This is a 80 y.o. male With a past medical history including asthma, Parkinson's, arthritis, prostate cancer, COPD, GERD, hyperlipidemia, hypertension, peripheral artery disease, essential tremor. Patient initially presented [facility] after he was found down on the floor. In the emergency room patient was noted to have rhabdomyolysis with elevated CK. Imaging completed of his right scapula, left elbow with no acute findings. Imaging of left hip shows angulated intertrochanteric left femur fracture and orthopedics was consulted. On 7/12/2022 patient underwent ORIF of left proximal femur with IMN. Patient noted to be alert and slightly confused the conversation appropriate. Patient did to be redirected during conversation several times. Patient endorses pain is doing well at this time. Denies any chest pain, nausea, vomiting, or diarrhea. CK noted to be downtrending. Functional History -   Baseline: Awaiting therapy evaluation    Current:Awaiting therapy evaluation      Living situation: Lives alone with caregivers 5 hours a day, no family available to help.     Past Medical History:   Diagnosis Date    Arthritis     Asthma     Burning with urination     Cancer [facility])     Prostate Finished radiation 3/2012    COPD     Essential tremor     GERD (gastroesophageal reflux disease)     Hyperlipidemia     Hypertension     PAD (peripheral artery disease) ([facility])      Past Surgical History:   Procedure Laterality Date    HC STNT SFA VIBN WLGO -H1  12/12/2005    LEFT Superficial Femoral Arter Stent    HX ENDOSCOPY      HX GI      Bleeding ulcers    HX HEART CATHETERIZATION      HX HEENT L eye    HX HEENT      Cataract    HX ORTHOPAEDIC      R & L Carpal tunnel    HX OTHER SURGICAL      Barrets esophagus    HX UROLOGICAL  05/11/2011    Prostate Bx    HX UROLOGICAL      Brachytherapy    HX UROLOGICAL  05/11/2011    cysto, BRP      Family History   Problem Relation Age of Onset    Diabetes Mother     Diabetes Sister     Diabetes Brother      Social History     Tobacco Use    Smoking status: Former Smoker    Smokeless tobacco: Never Used   Substance Use Topics    Alcohol use: Not Currently     Comment: twice a month      Current Facility-Administered Medications   Medication    lidocaine (PF) (XYLOCAINE) 10 mg/mL (1 %) injection 0.1 mL    0.9% sodium chloride infusion    sodium chloride (NS) flush 5-40 mL    acetaminophen (TYLENOL) tablet 1,000 mg    traMADoL (ULTRAM) tablet 25 mg    oxyCODONE IR (ROXICODONE) tablet 2.5 mg    aspirin delayed-release tablet 325 mg    celecoxib (CELEBREX) capsule 100 mg    piperacillin-tazobactam (ZOSYN) 3.375 g in 0.9% sodium chloride (MBP/ADV) 100 mL MBP    polyethylene glycol (MIRALAX) packet 17 g    ondansetron (ZOFRAN ODT) tablet 4 mg    Or    ondansetron (ZOFRAN) injection 4 mg    budesonide-formoteroL (SYMBICORT) 160-4.5 mcg/actuation HFA inhaler 2 Puff    albuterol-ipratropium (DUO-NEB) 2.5 MG-0.5 MG/3 ML    levoFLOXacin (LEVAQUIN) 750 mg in D5W IVPB        Allergies   Allergen Reactions    Nut - Unspecified Not Reported This Time       Review of Systems:  Positive for fatigue. Otherwise a complete review of systems was negative except as noted above in HPI.      Objective:     Vitals:    07/13/22 0744 07/13/22 0800 07/13/22 0924 07/13/22 1123   BP:    (!) 109/52   Pulse:  81 81 89   Resp:    18   Temp:    97.4 °F (36.3 °C)   SpO2: 91%   93%   Weight:       Height:            Physical Exam:  General: NAD, pleasant and cooperative , Ste[place]  HEENT: anicteric, moist oral mucosa   CV: RRR, no murmurs, 2+ pulses   Respiratory: clear and aerating well, no increased WOB  Abdomen: nondistended, nontender  Extremities: no peripheral edema   Musculoskeletal: moving extremities at least antigravity, DROM LLE, dressing in place  Neuro: alert, normal speech, confused CN 2-12 intact, sensation intact to light touch     Labs:  Recent Results (from the past 24 hour(s))   CBC WITH AUTOMATED DIFF    Collection Time: 07/13/22  5:40 AM   Result Value Ref Range    WBC 13.2 (H) 4.1 - 11.1 K/uL    RBC 2.73 (L) 4.10 - 5.70 M/uL    HGB 8.3 (L) 12.1 - 17.0 g/dL    HCT 26.4 (L) 36.6 - 50.3 %    MCV 96.7 80.0 - 99.0 FL    MCH 30.4 26.0 - 34.0 PG    MCHC 31.4 30.0 - 36.5 g/dL    RDW 15.0 (H) 11.5 - 14.5 %    PLATELET 655 249 - 824 K/uL    MPV 11.6 8.9 - 12.9 FL    NRBC 0.0 0.0  WBC    ABSOLUTE NRBC 0.00 0.00 - 0.01 K/uL    NEUTROPHILS 79 (H) 32 - 75 %    LYMPHOCYTES 7 (L) 12 - 49 %    MONOCYTES 13 5 - 13 %    EOSINOPHILS 0 0 - 7 %    BASOPHILS 0 0 - 1 %    IMMATURE GRANULOCYTES 1 (H) 0 - 0.5 %    ABS. NEUTROPHILS 10.5 (H) 1.8 - 8.0 K/UL    ABS. LYMPHOCYTES 1.0 0.8 - 3.5 K/UL    ABS. MONOCYTES 1.7 (H) 0.0 - 1.0 K/UL    ABS. EOSINOPHILS 0.0 0.0 - 0.4 K/UL    ABS. BASOPHILS 0.0 0.0 - 0.1 K/UL    ABS. IMM. GRANS. 0.1 (H) 0.00 - 0.04 K/UL    DF AUTOMATED     METABOLIC PANEL, BASIC    Collection Time: 07/13/22  5:40 AM   Result Value Ref Range    Sodium 144 136 - 145 mmol/L    Potassium 4.1 3.5 - 5.1 mmol/L    Chloride 114 (H) 97 - 108 mmol/L    CO2 23 21 - 32 mmol/L    Anion gap 7 5 - 15 mmol/L    Glucose 109 (H) 65 - 100 mg/dL    BUN 42 (H) 6 - 20 mg/dL    Creatinine 1.09 0.70 - 1.30 mg/dL    BUN/Creatinine ratio 39 (H) 12 - 20      GFR est AA >60 >60 ml/min/1.73m2    GFR est non-AA >60 >60 ml/min/1.73m2    Calcium 8.0 (L) 8.5 - 10.1 mg/dL         Imaging:  XR PELV AP ONLY    Result Date: 7/12/2022  ORIF left hip fracture. XR SCAPULA RT    Result Date: 7/11/2022  No acute fracture.      XR SHOULDER RT AP/LAT MIN 2 V    Result Date: 7/11/2022  No acute abnormality. XR ELBOW LT MIN 3 V    Result Date: 7/11/2022  No acute fracture. Technical factors as above. XR HIP LT W OR WO PELV 2-3 VWS    Result Date: 7/11/2022  1. Comminuted intertrochanteric anterior left hip fracture     XR HIP RT W OR WO PELV 2-3 VWS    Result Date: 7/11/2022  1. Comminuted intertrochanteric left hip fracture     XR FEMUR LT 2 V    Result Date: 7/11/2022  Unchanged angulated intertrochanteric left femur fracture. XR FLUOROSCOPY UNDER 60 MINUTES    Result Date: 7/12/2022  Fluoroscopic imaging during procedure. REPORT PROVIDED FOR COMPLIANCE ONLY AT NO CHARGE. CT HEAD WO CONT    Result Date: 7/11/2022  No acute intracranial abnormality. There is a LEFT frontal approach  shunt catheter in place with the tip to the LEFT of midline. CT CHEST W CONT    Result Date: 7/11/2022  1. Comminuted intertrochanteric left femur fracture. Subacute-appearing right 11th and 12th rib fractures. Several Chronic-appearing left rib fractures. 2.  Fat stranding/edema/contusion in the left retroperitoneum. Suspected intramuscular hematomas within the left iliopsoas and gluteal compartments. 3.  Right bladder mass, concerning for neoplasm. Nonemergent cystoscopy recommended for further evaluation. 4.  Left upper lobe partially calcified nodule, not seen on prior exams. Attention on follow-up. 5.  Additional nonacute findings as above. CT SPINE CERV WO CONT    Result Date: 7/11/2022  1. No evidence of acute fracture or subluxation of the cervical spine. 2. Moderate to severe degenerative disc disease at C5-C6 and C6-C7. There is multilevel right-sided moderate to severe facet arthritis. CT ABD PELV W CONT    Result Date: 7/11/2022  1. Comminuted intertrochanteric left femur fracture. Subacute-appearing right 11th and 12th rib fractures. Several Chronic-appearing left rib fractures. 2.  Fat stranding/edema/contusion in the left retroperitoneum.  Suspected intramuscular hematomas within the left iliopsoas and gluteal compartments. 3.  Right bladder mass, concerning for neoplasm. Nonemergent cystoscopy recommended for further evaluation. 4.  Left upper lobe partially calcified nodule, not seen on prior exams. Attention on follow-up. 5.  Additional nonacute findings as above. XR CHEST PORT    Result Date: 7/11/2022  No acute process. Impression/Plan:     Diagnoses:     Left comminuted intertrochanteric femur fracture  Status post ORIF and IMN  Parkinson's disease  Essential tremor  COPD  GERD  Hyperlipidemia  Hypertension  Peripheral artery disease  History of prostate cancer  Speech following in hospital    This patient would benefit from participation in an intensive inpatient rehabilitation program.     This patient can likely tolerate 3 hours of therapy per day. Barriers to rehab: Therapy evaluation    Continue PT/OT in the acute setting. Will continue to follow.        Signed By: Gabriela Mabry NP     July 13, 2022    Physical Medicine and Rehabilitation

## 2022-07-13 NOTE — PROGRESS NOTES
PHYSICAL THERAPY EVALUATION  Patient: Madiha Vicente (87 y.o. male)  Date: 7/13/2022  Primary Diagnosis: Fracture, hip ([facility] [address] [S72.009A]  Rhabdomyolysis [M62.82]  Procedure(s) (LRB):  FEMUR GAMMA NAIL INSERTION (Left) 1 Day Post-Op   Precautions:     ASSESSMENT  Pt is a 79 yo male admitted on 7/11/2022 for fall and sustained left hip Fx ; patient is S/P ORIF left hip with gamma nailing and is WBAT LLE  . PMH:As per H&P. Pt A&O x 2. Per patient and chart review pt resides alone in a 1 story home with caregiver 5 x week 12-5, pt was indep with some assist for ADLS/IADLS, RW AD with mobility prior to admission. Family checks on him thru webcam.    Based on the objective data described below, the patient presents with generalized weakness, impaired functional mobility, impaired amb, impaired balance, and impaired safety. Pt  Semi supine in bed tryiong to start getting OOB with OT upon PT arrival, agreeable to evaluation. Pt required mod A for bed mobility,  supine <> sit, mod to max Ax 2 sit <> stand (bed height required raised up) transfers. Pt amb  4 feet with gt belt, Rw, and mod-max A; demonstrating shuffling gt pattern with requiring constant assist due to LOB noted. Pt did fair with session today with PT/OT. Pt will benefit from continued skilled PT to address above deficits and return to PLOF. Current PT DC recommendation IRf due to above deficits. Current Level of Function Impacting Discharge (mobility/balance): patient indep with some PLOF but now requiring a lot of assist,also confused.     Other factors to consider for discharge: IRF     PLAN :  Recommendations and Planned Interventions: bed mobility training, transfer training, gait training, therapeutic exercises, patient and family training/education and therapeutic activities      Recommend with staff: can use BSC,slow mobility due to parkinson's    Frequency/Duration: Patient will be followed by physical therapy:  3-5x/week to address goals. Recommendation for discharge: (in order for the patient to meet his/her long term goals)  [address]     This discharge recommendation:  Has been made in collaboration with the attending provider and/or case management    IF patient discharges home will need the following DME: bedside commode, gait belt, hospital bed, rolling walker and wheelchair         SUBJECTIVE:   Patient stated I am ok.     OBJECTIVE DATA SUMMARY:   HISTORY:    Past Medical History:   Diagnosis Date    Arthritis     Asthma     Burning with urination     Cancer ([facility] [address]     Prostate Finished radiation 3/2012    COPD     Essential tremor     GERD (gastroesophageal reflux disease)     Hyperlipidemia     Hypertension     PAD (peripheral artery disease) (HCC)      Past Surgical History:   Procedure Laterality Date    HC STNT SFA VIBN WLGO -H1  12/12/2005    LEFT Superficial Femoral Arter Stent    HX ENDOSCOPY      HX GI      Bleeding ulcers    HX HEART CATHETERIZATION      HX HEENT      L eye    HX HEENT      Cataract    HX ORTHOPAEDIC      R & L Carpal tunnel    HX OTHER SURGICAL      Barrets esophagus    HX UROLOGICAL  05/11/2011    Prostate Bx    HX UROLOGICAL      Brachytherapy    HX UROLOGICAL  05/11/2011    cysto, BRP       Home Situation  Home Environment: Private residence  # Steps to Enter: 3  One/Two Story Residence: One story  Living Alone: Yes  Support Systems: Caregiver/Home Care Staff,Other Family Member(s) (Caregiver 5x/week from 12-5 PM, Family checks in w/ webcam)  Patient Expects to be Discharged to[de-identified] Rehab Unit Subacute  Current DME Used/Available at Home: Walker, rolling    EXAMINATION/PRESENTATION/DECISION MAKING:   Critical Behavior:  Neurologic State: Alert  Orientation Level: Oriented to person,Oriented to place  Cognition: Decreased attention/concentration  Safety/Judgement: Decreased awareness of need for safety  Hearing:   Auditory  Auditory Impairment: Hard of hearing, left side,Hard of hearing, right side,Hearing aid(s)  Hearing Aids/Status: Other (comment) (one hearing aid in belongings)    Range Of Motion:  AROM: (P) Generally decreased, functional                       Strength:    Strength: (P) Generally decreased, functional                    Tone & Sensation:   Tone: (P) Normal                              Coordination:  Coordination: (P) Generally decreased, functional       Functional Mobility:  Bed Mobility:     Supine to Sit: (P) Moderate assistance; Additional time     Scooting: (P) Moderate assistance;Assist x2  Transfers:  Sit to Stand: (P) Moderate assistance;Assist x2  Stand to Sit: (P) Moderate assistance;Assist x2        Bed to Chair: (P) Moderate assistance;Assist x2              Balance:   Sitting: (P) Impaired; Without support  Sitting - Static: (P) Good (unsupported)  Sitting - Dynamic: (P) Fair (occasional)  Standing: (P) Impaired; With support  Standing - Static: (P) Constant support;Poor  Standing - Dynamic : (P) Constant support;Poor  Ambulation/Gait Training:                                                        Functional Measure:  [employer] AM-PAC 6 Clicks         Basic Mobility Inpatient Short Form  How much difficulty does the patient currently have. .. Unable A Lot A Little None   1. Turning over in bed (including adjusting bedclothes, sheets and blankets)? [] 1   [x] 2   [] 3   [] 4   2. Sitting down on and standing up from a chair with arms ( e.g., wheelchair, bedside commode, etc.)   [] 1   [x] 2   [] 3   [] 4   3. Moving from lying on back to sitting on the side of the bed? [] 1   [x] 2   [] 3   [] 4          How much help from another person does the patient currently need. .. Total A Lot A Little None   4. Moving to and from a bed to a chair (including a wheelchair)? [] 1   [x] 2   [] 3   [] 4   5. Need to walk in hospital room? [] 1   [x] 2   [] 3   [] 4   6. Climbing 3-5 steps with a railing?    [x] 1   [] 2   [] 3   [] 4   © , Trustees of [facility] [employer], under license to [employer]. All rights reserved     Score:  Initial:  Most Recent: X (Date: 2022 )   Interpretation of Tool:  Represents activities that are increasingly more difficult (i.e. Bed mobility, Transfers, Gait). Score 24 23 22-20 19-15 14-10 9-7 6   Modifier CH CI CJ CK CL CM CN         Physical Therapy Evaluation Charge Determination   History Examination Presentation Decision-Making   LOW Complexity : Zero comorbidities / personal factors that will impact the outcome / POC LOW Complexity : 1-2 Standardized tests and measures addressing body structure, function, activity limitation and / or participation in recreation  LOW Complexity : Stable, uncomplicated  Other outcome measures [facility] 6        Based on the above components, the patient evaluation is determined to be of the following complexity level: LOW     Pain Ratin/10 left hip faces    Activity Tolerance:   Fair    After treatment patient left in no apparent distress:   Sitting in chair and Call bell within reach and board updated. GOALS:    Problem: Mobility Impaired (Adult and Pediatric)  Goal: *Acute Goals and Plan of Care (Insert Text)  Description: Patient stated goal:get better  Patient will move from supine to sit and sit to supine , scoot up and down, and roll side to side in bed with minimal assistance/contact guard assist within 7 day(s). Patient will transfer from bed to chair and chair to bed with minimal assistance/contact guard assist using the least restrictive device within 7 day(s). Patient will improve static standing balance to minimal assistance within 1 week(s). Patient will ambulate 25 feet with minimal assistance with least restrictive device within 1 weeks. Outcome: Progressing Towards Goal       COMMUNICATION/EDUCATION:   The patients plan of care was discussed with: Occupational therapist, Registered nurse and Case management.      Fall prevention education was provided and the patient/caregiver indicated understanding., Patient/family have participated as able in goal setting and plan of care. and Patient/family agree to work toward stated goals and plan of care. Thank you for this referral.  Jayesh Rea, PT.    Time Calculation: 35 mins

## 2022-07-13 NOTE — PROGRESS NOTES
Hospitalist Progress Note         Rock Hodgkins, APRN, ADALP-C    Daily Progress Note: 7/13/2022    89M with a past medical history of Arthritis, Asthma, Burning with urination, prostate cancer, COPD, Essential tremor, GERD (gastroesophageal reflux disease), Hyperlipidemia, Hypertension, and PAD (peripheral artery disease). He lives alone and has some occasional caregivers.     7/11/22 presents to hospital after being found down on the floor. Reportedly last seen at baseline two days prior. Patient is a poor historian and history is limited due to dementia versus encephalopathy. Patient reports falling and being on the floor for at least a day. During the ED course he reported right shoulder pain on palpation. Additional areas include both hips. He had several skin tears over the knees and the right hip. He underwent extensive trauma imaging and noted to have a hip fracture. Labs notable for markedly elevated CK. I have been asked to admit him to hospital for further stabilization and workup of his condition. 7/12 patient underwent ORIF. Subjective:   Subjective   Patient alert and confused sitting in recliner. No concerns offered on examination. No overnight events reported. No acute distress noted on examination. Left hip dressing intact. Review of Systems:   Review of Systems   Unable to perform ROS: Dementia   Constitutional: Negative for chills and fever. Respiratory: Negative for cough. Cardiovascular: Negative for chest pain. Musculoskeletal: Positive for falls and joint pain.          Objective:   Objective      Vitals:  Patient Vitals for the past 12 hrs:   Temp Pulse Resp BP SpO2   07/13/22 1123 97.4 °F (36.3 °C) 89 18 (!) 109/52 93 %   07/13/22 0924 -- 81 -- -- --   07/13/22 0800 -- 81 -- -- --   07/13/22 0744 -- -- -- -- 91 %   07/13/22 0742 97.9 °F (36.6 °C) 80 20 121/63 92 %   07/13/22 0400 -- 78 -- -- --   07/13/22 0313 98.1 °F (36.7 °C) 79 18 123/63 95 %        Physical Exam:  Physical Exam  Vitals and nursing note reviewed. Eyes:      Extraocular Movements: Extraocular movements intact. Cardiovascular:      Rate and Rhythm: Normal rate. Pulmonary:      Breath sounds: Normal breath sounds. Abdominal:      General: Bowel sounds are normal.   Musculoskeletal:         General: Signs of injury present. Comments: Left hip injury, limited ROM   Skin:     Comments: Left hip surgical dressing   Neurological:      Mental Status: He is alert. He is disoriented. Psychiatric:      Comments: Confused, pleasant, follows commands          Lab Results:  Recent Results (from the past 24 hour(s))   CBC WITH AUTOMATED DIFF    Collection Time: 07/13/22  5:40 AM   Result Value Ref Range    WBC 13.2 (H) 4.1 - 11.1 K/uL    RBC 2.73 (L) 4.10 - 5.70 M/uL    HGB 8.3 (L) 12.1 - 17.0 g/dL    HCT 26.4 (L) 36.6 - 50.3 %    MCV 96.7 80.0 - 99.0 FL    MCH 30.4 26.0 - 34.0 PG    MCHC 31.4 30.0 - 36.5 g/dL    RDW 15.0 (H) 11.5 - 14.5 %    PLATELET 061 274 - 334 K/uL    MPV 11.6 8.9 - 12.9 FL    NRBC 0.0 0.0  WBC    ABSOLUTE NRBC 0.00 0.00 - 0.01 K/uL    NEUTROPHILS 79 (H) 32 - 75 %    LYMPHOCYTES 7 (L) 12 - 49 %    MONOCYTES 13 5 - 13 %    EOSINOPHILS 0 0 - 7 %    BASOPHILS 0 0 - 1 %    IMMATURE GRANULOCYTES 1 (H) 0 - 0.5 %    ABS. NEUTROPHILS 10.5 (H) 1.8 - 8.0 K/UL    ABS. LYMPHOCYTES 1.0 0.8 - 3.5 K/UL    ABS. MONOCYTES 1.7 (H) 0.0 - 1.0 K/UL    ABS. EOSINOPHILS 0.0 0.0 - 0.4 K/UL    ABS. BASOPHILS 0.0 0.0 - 0.1 K/UL    ABS. IMM.  GRANS. 0.1 (H) 0.00 - 0.04 K/UL    DF AUTOMATED     METABOLIC PANEL, BASIC    Collection Time: 07/13/22  5:40 AM   Result Value Ref Range    Sodium 144 136 - 145 mmol/L    Potassium 4.1 3.5 - 5.1 mmol/L    Chloride 114 (H) 97 - 108 mmol/L    CO2 23 21 - 32 mmol/L    Anion gap 7 5 - 15 mmol/L    Glucose 109 (H) 65 - 100 mg/dL    BUN 42 (H) 6 - 20 mg/dL    Creatinine 1.09 0.70 - 1.30 mg/dL    BUN/Creatinine ratio 39 (H) 12 - 20      GFR est AA >60 >60 ml/min/1.73m2    GFR est non-AA >60 >60 ml/min/1.73m2    Calcium 8.0 (L) 8.5 - 10.1 mg/dL          Diagnostic Images:  CT Results  (Last 48 hours)               07/11/22 1319  CT HEAD WO CONT Final result    Impression:  No acute intracranial abnormality. There is a LEFT frontal approach  shunt   catheter in place with the tip to the LEFT of midline. Narrative:  EXAM: CT HEAD WO CONT       INDICATION: Fall       COMPARISON: CT head without contrast July 19, 2017. CONTRAST: None. TECHNIQUE: Unenhanced CT of the head was performed using 5 mm images. Brain and   bone windows were generated. Coronal and sagittal reformats. CT dose reduction   was achieved through use of a standardized protocol tailored for this   examination and automatic exposure control for dose modulation. FINDINGS:   The ventricles and sulci are normal in size, shape and configuration. . There is   a LEFT frontal approach shunt catheter in place with the tip to the LEFT of   midline. There is no intracranial hemorrhage, extra-axial collection, or mass   effect. The basilar cisterns are open. No CT evidence of acute infarct. The bone windows demonstrate no abnormalities. The visualized portions of the   paranasal sinuses and mastoid air cells are clear.           07/11/22 1319  CT SPINE CERV WO CONT Final result    Impression:      1. No evidence of acute fracture or subluxation of the cervical spine. 2. Moderate to severe degenerative disc disease at C5-C6 and C6-C7. There is   multilevel right-sided moderate to severe facet arthritis. Narrative:  EXAM:  CT CERVICAL SPINE WITHOUT CONTRAST       INDICATION: Fall. COMPARISON: None. CONTRAST:  None. TECHNIQUE: Multislice helical CT of the cervical spine was performed without   intravenous contrast administration. Sagittal and coronal reformats were   generated.   CT dose reduction was achieved through use of a standardized   protocol tailored for this examination and automatic exposure control for dose   modulation. FINDINGS:       The alignment is within normal limits. There is no fracture or compression   deformity. The odontoid process is intact. The craniocervical junction is within   normal limits. Severe disc space narrowing and endplate degeneration are seen at   the C5-C6 and C6-C7 levels. There is bilateral, multilevel facet arthritis which   is worse on the RIGHT side. No bony canal narrowing is appreciated. The neck   soft tissues are grossly unremarkable. A left-sided  shunt catheter is noted. Emphysematous changes are seen at the lung apices. 07/11/22 1319  CT CHEST W CONT Final result    Impression:  1. Comminuted intertrochanteric left femur fracture. Subacute-appearing right   11th and 12th rib fractures. Several Chronic-appearing left rib fractures. 2.  Fat stranding/edema/contusion in the left retroperitoneum. Suspected   intramuscular hematomas within the left iliopsoas and gluteal compartments. 3.  Right bladder mass, concerning for neoplasm. Nonemergent cystoscopy   recommended for further evaluation. 4.  Left upper lobe partially calcified nodule, not seen on prior exams. Attention on follow-up. 5.  Additional nonacute findings as above. Narrative:  EXAM: CT CHEST W CONT, CT ABD PELV W CONT       INDICATION:    Chest: Ground-level fall; diffuse torso pain, poor historian; rule out traumatic   injury   Abdomen pelvis: Abdominal pain       COMPARISON: CT chest 10/27/2008, CT abdomen and pelvis 6/6/2011. TECHNIQUE: Helical CT of the chest, abdomen, and pelvis following the uneventful   intravenous administration of 100 mL Isovue-370. Oral contrast was utilized. Coronal and sagittal reformats were generated. CT dose reduction was achieved   through use of a standardized protocol tailored for this examination and   automatic exposure control for dose modulation. FINDINGS:       THYROID: No nodule. MEDIASTINUM: No mass or lymphadenopathy. ES: No mass or lymphadenopathy. THORACIC AORTA: No aneurysm. Atherosclerosis. MAIN PULMONARY ARTERY: Normal in caliber. HEART: Cardiomegaly. Coronary artery calcifications. ESOPHAGUS: Patulous with fluid and debris. TRACHEA/BRONCHI: Patent. PLEURA: No effusion or pneumothorax. LUNGS: Emphysema. No acute airspace disease. 6 mm centrally calcified left upper   lobe nodule, not seen on prior exams. .       Liver: No mass or biliary dilatation. Biliary tree: The gallbladder is within normal limits. The CBD is not dilated. Spleen: Calcified granulomas. Pancreas: No inflammation, mass or ductal dilatation. Adrenals: Unremarkable. Kidneys: No mass or hydronephrosis. A 0.7 cm x 6.4 cm bilobed left renal cyst   with a few thin partially calcified septations (Bosniak 2). Several additional   bilateral renal cysts and subcentimeter hypodensities are too small to   accurately characterize; no dedicated follow-up recommended   Stomach: Unremarkable. Small bowel: No dilatation or wall thickening. Colon: No dilatation or wall thickening. Diverticulosis. Appendix: Normal   Peritoneum: No ascites or pneumoperitoneum. Retroperitoneum: No lymphadenopathy or aortic aneurysm. Atherosclerosis. Fat   stranding/edema/contusion in the left retroperitoneum. Suspected intramuscular   hematomas within the left iliopsoas and gluteal compartments. Reproductive organs: Prostatic brachytherapy seeds. Urinary bladder: No mass or calculus. Bones: Several chronic-appearing left-sided rib fracture deformities. Subacute-chronic appearing posterior right 11-12 rib fractures. Comminuted   intertrochanteric left femur fracture. Degenerative changes. Grade 1   anterolisthesis of L4 on L5. 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 Abdominal wall: 2.2 cm x 1.2 cm right posterior eccentric mass (501-120).    Additional comments: N/A           07/11/22 9269  CT ABD PELV W CONT Final result    Impression:  1. Comminuted intertrochanteric left femur fracture. Subacute-appearing right   11th and 12th rib fractures. Several Chronic-appearing left rib fractures. 2.  Fat stranding/edema/contusion in the left retroperitoneum. Suspected   intramuscular hematomas within the left iliopsoas and gluteal compartments. 3.  Right bladder mass, concerning for neoplasm. Nonemergent cystoscopy   recommended for further evaluation. 4.  Left upper lobe partially calcified nodule, not seen on prior exams. Attention on follow-up. 5.  Additional nonacute findings as above. Narrative:  EXAM: CT CHEST W CONT, CT ABD PELV W CONT       INDICATION:    Chest: Ground-level fall; diffuse torso pain, poor historian; rule out traumatic   injury   Abdomen pelvis: Abdominal pain       COMPARISON: CT chest 10/27/2008, CT abdomen and pelvis 6/6/2011. TECHNIQUE: Helical CT of the chest, abdomen, and pelvis following the uneventful   intravenous administration of 100 mL Isovue-370. Oral contrast was utilized. Coronal and sagittal reformats were generated. CT dose reduction was achieved   through use of a standardized protocol tailored for this examination and   automatic exposure control for dose modulation. FINDINGS:       THYROID: No nodule. MEDIASTINUM: No mass or lymphadenopathy. ES: No mass or lymphadenopathy. THORACIC AORTA: No aneurysm. Atherosclerosis. MAIN PULMONARY ARTERY: Normal in caliber. HEART: Cardiomegaly. Coronary artery calcifications. ESOPHAGUS: Patulous with fluid and debris. TRACHEA/BRONCHI: Patent. PLEURA: No effusion or pneumothorax. LUNGS: Emphysema. No acute airspace disease. 6 mm centrally calcified left upper   lobe nodule, not seen on prior exams. .       Liver: No mass or biliary dilatation. Biliary tree: The gallbladder is within normal limits. The CBD is not dilated. Spleen: Calcified granulomas.    Pancreas: No inflammation, mass or ductal dilatation. Adrenals: Unremarkable. Kidneys: No mass or hydronephrosis. A 0.7 cm x 6.4 cm bilobed left renal cyst   with a few thin partially calcified septations (Bosniak 2). Several additional   bilateral renal cysts and subcentimeter hypodensities are too small to   accurately characterize; no dedicated follow-up recommended   Stomach: Unremarkable. Small bowel: No dilatation or wall thickening. Colon: No dilatation or wall thickening. Diverticulosis. Appendix: Normal   Peritoneum: No ascites or pneumoperitoneum. Retroperitoneum: No lymphadenopathy or aortic aneurysm. Atherosclerosis. Fat   stranding/edema/contusion in the left retroperitoneum. Suspected intramuscular   hematomas within the left iliopsoas and gluteal compartments. Reproductive organs: Prostatic brachytherapy seeds. Urinary bladder: No mass or calculus. Bones: Several chronic-appearing left-sided rib fracture deformities. Subacute-chronic appearing posterior right 11-12 rib fractures. Comminuted   intertrochanteric left femur fracture. Degenerative changes. Grade 1   anterolisthesis of L4 on L5. 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 Abdominal wall: 2.2 cm x 1.2 cm right posterior eccentric mass (501-120).    Additional comments: N/A                 Current Medications:    Current Facility-Administered Medications:     lidocaine (PF) (XYLOCAINE) 10 mg/mL (1 %) injection 0.1 mL, 0.1 mL, SubCUTAneous, PRN, Shmion Castro MD    0.9% sodium chloride infusion, 125 mL/hr, IntraVENous, CONTINUOUS, Shimon Castro MD, Last Rate: 125 mL/hr at 07/12/22 2225, 125 mL/hr at 07/12/22 2225    sodium chloride (NS) flush 5-40 mL, 5-40 mL, IntraVENous, PRN, Shimon Castro MD    acetaminophen (TYLENOL) tablet 1,000 mg, 1,000 mg, Oral, Q6H, Shimon Castro MD, 1,000 mg at 07/13/22 1223    traMADoL (ULTRAM) tablet 25 mg, 25 mg, Oral, Q6H PRN, MD Marsha Virk oxyCODONE IR (ROXICODONE) tablet 2.5 mg, 2.5 mg, Oral, Q4H PRN, Marguerite Gonzalez MD    aspirin delayed-release tablet 325 mg, 325 mg, Oral, BID, Marguerite Gonzalez MD, 325 mg at 07/13/22 0949    celecoxib (CELEBREX) capsule 100 mg, 100 mg, Oral, BID, Marguerite Gonzalez MD, 100 mg at 07/13/22 0949    [COMPLETED] piperacillin-tazobactam (ZOSYN) 4.5 g in 0.9% sodium chloride (MBP/ADV) 100 mL MBP, 4.5 g, IntraVENous, ONCE, Last Rate: 200 mL/hr at 07/11/22 1202, 4.5 g at 07/11/22 1202 **FOLLOWED BY** piperacillin-tazobactam (ZOSYN) 3.375 g in 0.9% sodium chloride (MBP/ADV) 100 mL MBP, 3.375 g, IntraVENous, Q8H, Marguerite Gonzalez MD, Last Rate: 25 mL/hr at 07/13/22 0949, 3.375 g at 07/13/22 0949    polyethylene glycol (MIRALAX) packet 17 g, 17 g, Oral, DAILY PRN, Marguerite Gonzalez MD    ondansetron (ZOFRAN ODT) tablet 4 mg, 4 mg, Oral, Q8H PRN **OR** ondansetron (ZOFRAN) injection 4 mg, 4 mg, IntraVENous, Q6H PRN, Marguerite Gonzalez MD    budesonide-formoteroL (SYMBICORT) 160-4.5 mcg/actuation HFA inhaler 2 Puff, 2 Puff, Inhalation, BID, Marguerite Gonzalez MD, 2 Puff at 07/13/22 0748    albuterol-ipratropium (DUO-NEB) 2.5 MG-0.5 MG/3 ML, 3 mL, Nebulization, Q4H PRN, Marguerite Gonzalez MD    levoFLOXacin (LEVAQUIN) 750 mg in D5W IVPB, 750 mg, IntraVENous, Q48H, Marguerite Gonzalez MD       ASSESSMENT:  1) Ground level fall at home with left hip fracture and right 11-12 rib fractures.   Noted on cxr   Continue supportive care  Orthopedics on board for anticipated operative management   Zosyn and levofloxacin started during the ED course  POD #1 left hip ORIF on 7/12/22  Prn pain management     2) Cardiovascular issues including PAD, hypertension. Telemetry monitoring  Echocardiogram pending     3) Rhabdomyolysis in the setting of fall, further complicated by dehydration.   Continue Supportive care  Gentle Intravenous fluids  Trend CK     4) COPD  Appears stable, no exacerbation  Bronchodilators as needed          Full Code  Dvt Prophylaxis none  GI Prophylaxis none  Discharge barriers:  PT/OT as tolerated  Pt/ot once cleared by ortho  Prn pain management  CM for dispo planning      Above treatment plan reviewed and discussed with patient in detail at bedside, all questions answered. Care Plan discussed with: Interdisciplinary team    Total time spent with patient: 35 minutes.     Winda Snellen, NP

## 2022-07-13 NOTE — CONSULTS
[address] [facility] SURGERY CONSULT          Chief Complaint: Fall with left proximal femur intertrochanteric fracture     History of Present Illness:    Mr. Emily Lopez is a 80y.o. year old * male presents to ER after a ground level fall on Saturday,  followed by loss of consciousness and was brought here on Monday. No fever or chills, nausea or vomiting. S/p open reduction and internal fixation of left proximal femur intertrochanteric fracture, with Gamma Nail intramedullary device. POD #1. Patient is progressing well. Sitting upright in chair. Will eventually progress to ambulatory trial. Currently being followed by ortho for post op care. Patient has been accepted to [facility] once medically cleared. Cognitive function has improved back to baseline. Denies any left side chest pain or shortness of breath. Past Medical History:   Past Medical History:   Diagnosis Date    Arthritis     Asthma     Burning with urination     Cancer [facility])     Prostate Finished radiation 3/2012    COPD     Essential tremor     GERD (gastroesophageal reflux disease)     Hyperlipidemia     Hypertension     PAD (peripheral artery disease) ([facility])        Past Surgical History:   Past Surgical History:   Procedure Laterality Date    HC STNT SFA VIBN WLGO -H1  12/12/2005    LEFT Superficial Femoral Arter Stent    HX ENDOSCOPY      HX GI      Bleeding ulcers    HX HEART CATHETERIZATION      HX HEENT      L eye    HX HEENT      Cataract    HX ORTHOPAEDIC      R & L Carpal tunnel    HX OTHER SURGICAL      Barrets esophagus    HX UROLOGICAL  05/11/2011    Prostate Bx    HX UROLOGICAL      Brachytherapy    HX UROLOGICAL  05/11/2011    cysto, BRP        Allergy:  Allergies   Allergen Reactions    Nut - Unspecified Not Reported This Time       Social History:  reports that he has quit smoking. He has never used smokeless tobacco. He reports previous alcohol use. He reports that he does not use drugs.      Family History:  Family History   Problem Relation Age of Onset    Diabetes Mother     Diabetes Sister     Diabetes Brother         Current Medications:  Current Facility-Administered Medications:     lidocaine (PF) (XYLOCAINE) 10 mg/mL (1 %) injection 0.1 mL, 0.1 mL, SubCUTAneous, PRN, Yessica Cueto MD    0.9% sodium chloride infusion, 125 mL/hr, IntraVENous, CONTINUOUS, Yessica Cueto MD, Last Rate: 125 mL/hr at 07/12/22 2225, 125 mL/hr at 07/12/22 2225    sodium chloride (NS) flush 5-40 mL, 5-40 mL, IntraVENous, PRN, Yessica Cueto MD    acetaminophen (TYLENOL) tablet 1,000 mg, 1,000 mg, Oral, Q6H, Yessica uCeto MD, 1,000 mg at 07/13/22 1223    traMADoL (ULTRAM) tablet 25 mg, 25 mg, Oral, Q6H PRN, Yessica Cueto MD    oxyCODONE IR (ROXICODONE) tablet 2.5 mg, 2.5 mg, Oral, Q4H PRN, Yessica Cueto MD    aspirin delayed-release tablet 325 mg, 325 mg, Oral, BID, Yessica Cueto MD, 325 mg at 07/13/22 0949    celecoxib (CELEBREX) capsule 100 mg, 100 mg, Oral, BID, Yessica Cueto MD, 100 mg at 07/13/22 0949    [COMPLETED] piperacillin-tazobactam (ZOSYN) 4.5 g in 0.9% sodium chloride (MBP/ADV) 100 mL MBP, 4.5 g, IntraVENous, ONCE, Last Rate: 200 mL/hr at 07/11/22 1202, 4.5 g at 07/11/22 1202 **FOLLOWED BY** piperacillin-tazobactam (ZOSYN) 3.375 g in 0.9% sodium chloride (MBP/ADV) 100 mL MBP, 3.375 g, IntraVENous, Q8H, Yessica Cueto MD, Last Rate: 25 mL/hr at 07/13/22 0949, 3.375 g at 07/13/22 0949    polyethylene glycol (MIRALAX) packet 17 g, 17 g, Oral, DAILY PRN, Yessica Cueto MD    ondansetron (ZOFRAN ODT) tablet 4 mg, 4 mg, Oral, Q8H PRN **OR** ondansetron (ZOFRAN) injection 4 mg, 4 mg, IntraVENous, Q6H PRN, Yessica Cueto MD    budesonide-formoteroL (SYMBICORT) 160-4.5 mcg/actuation HFA inhaler 2 Puff, 2 Puff, Inhalation, BID, Yessica Cueto MD, 2 Puff at 07/13/22 0748    albuterol-ipratropium (DUO-NEB) 2.5 MG-0.5 MG/3 ML, 3 mL, Nebulization, Q4H PRN, Kasia Valladares MD    levoFLOXacin (LEVAQUIN) 750 mg in D5W IVPB, 750 mg, IntraVENous, Q48H, Kasia Valladares MD     Immunization History:   Most Recent Immunizations   Administered Date(s) Administered    Influenza Vaccine 09/21/2015      Complete    Review of Systems: Unable  To complete    Physical Exam:     Vitals & Measurements: Wt Readings from Last 3 Encounters:   07/13/22 74.7 kg (164 lb 10.9 oz)   08/14/20 75.3 kg (166 lb)   09/26/19 75.3 kg (166 lb)     Temp Readings from Last 3 Encounters:   07/13/22 97.4 °F (36.3 °C)   08/14/20 98.1 °F (36.7 °C) (Oral)   07/16/20 97.7 °F (36.5 °C) (Oral)     BP Readings from Last 3 Encounters:   07/13/22 (!) 109/52   08/14/20 127/79   07/16/20 144/85     Pulse Readings from Last 3 Encounters:   07/13/22 89   06/06/12 80   12/07/11 78      Ht Readings from Last 3 Encounters:   07/11/22 5' 10\" (1.778 m)   08/14/20 5' 5\" (1.651 m)   07/16/20 5' 9\" (1.753 m)          General: well appearing, no acute distress, confused   Head: Normal  Face: Nornal  HEENT: atraumatic, PERRLA, moist mucosa, normal pharynx, normal tonsils and adenoids, normal tongue, no fluid in sinuses  Neck: Trachea midline, no carotid bruit, no masses  Chest: Normal.  Respiratory: Normal chest wall expansion, CTA B, no r/r/w, no rubs  Cardiovascular: RRR, no m/r/g, Normal S1 and S2  Abdomen: Soft, non tender, non-distended, normal bowel sounds in all quadrants, no hepatosplenomegaly, no tympany. Genitourinary: No inguinal hernia, normal external gentalia, Testis & scrotum normal, no renal angle tenderness  Rectal: deferred  Musculoskeletal: normal ROM in upper and lower extremities, No joint swelling. Surgical dressing over left hip incision.    Integumentary: Warm, dry, and pink, with no rash, purpura, or petechia  Heme/Lymph: No lymphadenopathy, no bruises  Neurological:Cranial Nerves II-XII grossly intact, no gross motor or sensory deficit  Psychiatric: alert but not oriented, confused but at patient's reported baseline       Laboratory Values:   Recent Results (from the past 24 hour(s))   CBC WITH AUTOMATED DIFF    Collection Time: 07/13/22  5:40 AM   Result Value Ref Range    WBC 13.2 (H) 4.1 - 11.1 K/uL    RBC 2.73 (L) 4.10 - 5.70 M/uL    HGB 8.3 (L) 12.1 - 17.0 g/dL    HCT 26.4 (L) 36.6 - 50.3 %    MCV 96.7 80.0 - 99.0 FL    MCH 30.4 26.0 - 34.0 PG    MCHC 31.4 30.0 - 36.5 g/dL    RDW 15.0 (H) 11.5 - 14.5 %    PLATELET 369 989 - 979 K/uL    MPV 11.6 8.9 - 12.9 FL    NRBC 0.0 0.0  WBC    ABSOLUTE NRBC 0.00 0.00 - 0.01 K/uL    NEUTROPHILS 79 (H) 32 - 75 %    LYMPHOCYTES 7 (L) 12 - 49 %    MONOCYTES 13 5 - 13 %    EOSINOPHILS 0 0 - 7 %    BASOPHILS 0 0 - 1 %    IMMATURE GRANULOCYTES 1 (H) 0 - 0.5 %    ABS. NEUTROPHILS 10.5 (H) 1.8 - 8.0 K/UL    ABS. LYMPHOCYTES 1.0 0.8 - 3.5 K/UL    ABS. MONOCYTES 1.7 (H) 0.0 - 1.0 K/UL    ABS. EOSINOPHILS 0.0 0.0 - 0.4 K/UL    ABS. BASOPHILS 0.0 0.0 - 0.1 K/UL    ABS. IMM. GRANS. 0.1 (H) 0.00 - 0.04 K/UL    DF AUTOMATED     METABOLIC PANEL, BASIC    Collection Time: 07/13/22  5:40 AM   Result Value Ref Range    Sodium 144 136 - 145 mmol/L    Potassium 4.1 3.5 - 5.1 mmol/L    Chloride 114 (H) 97 - 108 mmol/L    CO2 23 21 - 32 mmol/L    Anion gap 7 5 - 15 mmol/L    Glucose 109 (H) 65 - 100 mg/dL    BUN 42 (H) 6 - 20 mg/dL    Creatinine 1.09 0.70 - 1.30 mg/dL    BUN/Creatinine ratio 39 (H) 12 - 20      GFR est AA >60 >60 ml/min/1.73m2    GFR est non-AA >60 >60 ml/min/1.73m2    Calcium 8.0 (L) 8.5 - 10.1 mg/dL           XR PELV AP ONLY   Final Result   ORIF left hip fracture. XR FLUOROSCOPY UNDER 60 MINUTES   Final Result   Fluoroscopic imaging during procedure. REPORT PROVIDED FOR COMPLIANCE ONLY AT NO CHARGE. XR SCAPULA RT   Final Result   No acute fracture. XR ELBOW LT MIN 3 V   Final Result   No acute fracture. Technical factors as above.       XR FEMUR LT 2 V   Final Result   Unchanged angulated intertrochanteric left femur fracture. CT HEAD WO CONT   Final Result   No acute intracranial abnormality. There is a LEFT frontal approach  shunt   catheter in place with the tip to the LEFT of midline. CT SPINE CERV WO CONT   Final Result      1. No evidence of acute fracture or subluxation of the cervical spine. 2. Moderate to severe degenerative disc disease at C5-C6 and C6-C7. There is   multilevel right-sided moderate to severe facet arthritis. CT CHEST W CONT   Final Result   1. Comminuted intertrochanteric left femur fracture. Subacute-appearing right   11th and 12th rib fractures. Several Chronic-appearing left rib fractures. 2.  Fat stranding/edema/contusion in the left retroperitoneum. Suspected   intramuscular hematomas within the left iliopsoas and gluteal compartments. 3.  Right bladder mass, concerning for neoplasm. Nonemergent cystoscopy   recommended for further evaluation. 4.  Left upper lobe partially calcified nodule, not seen on prior exams. Attention on follow-up. 5.  Additional nonacute findings as above. CT ABD PELV W CONT   Final Result   1. Comminuted intertrochanteric left femur fracture. Subacute-appearing right   11th and 12th rib fractures. Several Chronic-appearing left rib fractures. 2.  Fat stranding/edema/contusion in the left retroperitoneum. Suspected   intramuscular hematomas within the left iliopsoas and gluteal compartments. 3.  Right bladder mass, concerning for neoplasm. Nonemergent cystoscopy   recommended for further evaluation. 4.  Left upper lobe partially calcified nodule, not seen on prior exams. Attention on follow-up. 5.  Additional nonacute findings as above. XR CHEST PORT   Final Result   No acute process. XR SHOULDER RT AP/LAT MIN 2 V   Final Result   No acute abnormality. XR HIP RT W OR WO PELV 2-3 VWS   Final Result   1.  Comminuted intertrochanteric left hip fracture XR HIP LT W OR WO PELV 2-3 VWS   Final Result   1. Comminuted intertrochanteric anterior left hip fracture           Assessment:  Problem List Items Addressed This Visit     None      Visit Diagnoses     Intertrochanteric fracture of left hip, closed, initial encounter ([facility] [address]    -  Primary    Traumatic rhabdomyolysis, initial encounter ([facility] [address]        Hematoma of left iliopsoas muscle, initial encounter        Closed fracture of multiple ribs of left side, initial encounter        Mass of urinary bladder               Plan:    1. Admission to ortho unit   2. Diet - regular   3. IV fluids  4. SCD  5. IS  6. Pain medications  7. Antibiotics  8. Nausea medication  9. Labs in am  10. No acute Trauma/General Surgery issues. 11. Will sign off on care of patient        Thank you for the consultation & allowing me to participate in the care of this patient.

## 2022-07-14 VITALS
BODY MASS INDEX: 19.76 KG/M2 | RESPIRATION RATE: 17 BRPM | HEIGHT: 70 IN | SYSTOLIC BLOOD PRESSURE: 111 MMHG | TEMPERATURE: 97.8 F | DIASTOLIC BLOOD PRESSURE: 61 MMHG | OXYGEN SATURATION: 93 % | HEART RATE: 80 BPM | WEIGHT: 138.01 LBS

## 2022-07-14 LAB
BACTERIA SPEC CULT: NORMAL
ECHO AO ROOT DIAM: 2.9 CM
ECHO AO ROOT INDEX: 1.51 CM/M2
ECHO AV AREA PEAK VELOCITY: 1.6 CM2
ECHO AV AREA VTI: 1.8 CM2
ECHO AV AREA/BSA PEAK VELOCITY: 0.8 CM2/M2
ECHO AV AREA/BSA VTI: 0.9 CM2/M2
ECHO AV MEAN GRADIENT: 6 MMHG
ECHO AV MEAN VELOCITY: 1.2 M/S
ECHO AV PEAK GRADIENT: 13 MMHG
ECHO AV PEAK VELOCITY: 1.8 M/S
ECHO AV VELOCITY RATIO: 0.5
ECHO AV VTI: 27.4 CM
ECHO EST RA PRESSURE: 3 MMHG
ECHO LA AREA 4C: 12.1 CM2
ECHO LA DIAMETER INDEX: 1.51 CM/M2
ECHO LA DIAMETER: 2.9 CM
ECHO LA MAJOR AXIS: 4.7 CM
ECHO LA TO AORTIC ROOT RATIO: 1
ECHO LA VOL 4C: 24 ML (ref 18–58)
ECHO LA VOLUME INDEX A4C: 13 ML/M2 (ref 16–34)
ECHO LV E' LATERAL VELOCITY: 11 CM/S
ECHO LV E' SEPTAL VELOCITY: 11 CM/S
ECHO LV EDV A2C: 12 ML
ECHO LV EDV A4C: 25 ML
ECHO LV EDV INDEX A4C: 13 ML/M2
ECHO LV EDV NDEX A2C: 6 ML/M2
ECHO LV EJECTION FRACTION A2C: 56 %
ECHO LV EJECTION FRACTION A4C: 46 %
ECHO LV ESV A2C: 5 ML
ECHO LV ESV A4C: 14 ML
ECHO LV ESV INDEX A2C: 3 ML/M2
ECHO LV ESV INDEX A4C: 7 ML/M2
ECHO LV FRACTIONAL SHORTENING: 13 % (ref 28–44)
ECHO LV INTERNAL DIMENSION DIASTOLE INDEX: 2.03 CM/M2
ECHO LV INTERNAL DIMENSION DIASTOLIC: 3.9 CM (ref 4.2–5.9)
ECHO LV INTERNAL DIMENSION SYSTOLIC INDEX: 1.77 CM/M2
ECHO LV INTERNAL DIMENSION SYSTOLIC: 3.4 CM
ECHO LV IVSD: 0.7 CM (ref 0.6–1)
ECHO LV MASS 2D: 89.7 G (ref 88–224)
ECHO LV MASS INDEX 2D: 46.7 G/M2 (ref 49–115)
ECHO LV POSTERIOR WALL DIASTOLIC: 0.9 CM (ref 0.6–1)
ECHO LV RELATIVE WALL THICKNESS RATIO: 0.46
ECHO LVOT AREA: 3.1 CM2
ECHO LVOT AV VTI INDEX: 0.57
ECHO LVOT DIAM: 2 CM
ECHO LVOT MEAN GRADIENT: 2 MMHG
ECHO LVOT PEAK GRADIENT: 3 MMHG
ECHO LVOT PEAK VELOCITY: 0.9 M/S
ECHO LVOT STROKE VOLUME INDEX: 25.7 ML/M2
ECHO LVOT SV: 49.3 ML
ECHO LVOT VTI: 15.7 CM
ECHO MV A VELOCITY: 0.5 M/S
ECHO MV AREA VTI: 1.6 CM2
ECHO MV E DECELERATION TIME (DT): 216 MS
ECHO MV E VELOCITY: 0.99 M/S
ECHO MV E/A RATIO: 1.98
ECHO MV E/E' LATERAL: 9
ECHO MV E/E' RATIO (AVERAGED): 9
ECHO MV E/E' SEPTAL: 9
ECHO MV LVOT VTI INDEX: 2.02
ECHO MV MAX VELOCITY: 1 M/S
ECHO MV MEAN GRADIENT: 2 MMHG
ECHO MV MEAN VELOCITY: 0.6 M/S
ECHO MV PEAK GRADIENT: 4 MMHG
ECHO MV REGURGITANT PEAK GRADIENT: 46 MMHG
ECHO MV REGURGITANT PEAK VELOCITY: 3.4 M/S
ECHO MV REGURGITANT VTIA: 105 CM
ECHO MV VTI: 31.7 CM
ECHO PV MAX VELOCITY: 0.9 M/S
ECHO PV PEAK GRADIENT: 3 MMHG
ECHO RIGHT VENTRICULAR SYSTOLIC PRESSURE (RVSP): 41 MMHG
ECHO RV TAPSE: 2 CM (ref 1.7–?)
ECHO TV REGURGITANT MAX VELOCITY: 3.08 M/S
ECHO TV REGURGITANT PEAK GRADIENT: 38 MMHG
GLUCOSE BLD STRIP.AUTO-MCNC: 96 MG/DL (ref 65–117)
PERFORMED BY, TECHID: NORMAL
SPECIAL REQUESTS,SREQ: NORMAL

## 2022-07-14 PROCEDURE — 74011000258 HC RX REV CODE- 258: Performed by: ORTHOPAEDIC SURGERY

## 2022-07-14 PROCEDURE — 94640 AIRWAY INHALATION TREATMENT: CPT

## 2022-07-14 PROCEDURE — 92526 ORAL FUNCTION THERAPY: CPT

## 2022-07-14 PROCEDURE — 74011250636 HC RX REV CODE- 250/636: Performed by: ORTHOPAEDIC SURGERY

## 2022-07-14 PROCEDURE — 97530 THERAPEUTIC ACTIVITIES: CPT

## 2022-07-14 PROCEDURE — 82962 GLUCOSE BLOOD TEST: CPT

## 2022-07-14 PROCEDURE — 74011250637 HC RX REV CODE- 250/637: Performed by: ORTHOPAEDIC SURGERY

## 2022-07-14 RX ORDER — CELECOXIB 100 MG/1
100 CAPSULE ORAL 2 TIMES DAILY
Qty: 20 CAPSULE | Refills: 0 | Status: SHIPPED
Start: 2022-07-14 | End: 2022-07-24

## 2022-07-14 RX ORDER — ASPIRIN 325 MG
325 TABLET, DELAYED RELEASE (ENTERIC COATED) ORAL DAILY
Qty: 30 TABLET | Refills: 0 | Status: SHIPPED
Start: 2022-07-14 | End: 2022-08-13

## 2022-07-14 RX ORDER — OXYCODONE HYDROCHLORIDE 5 MG/1
2.5 TABLET ORAL
Qty: 15 TABLET | Refills: 0 | Status: SHIPPED
Start: 2022-07-14 | End: 2022-07-19

## 2022-07-14 RX ADMIN — BUDESONIDE AND FORMOTEROL FUMARATE DIHYDRATE 2 PUFF: 160; 4.5 AEROSOL RESPIRATORY (INHALATION) at 08:27

## 2022-07-14 RX ADMIN — PIPERACILLIN AND TAZOBACTAM 3.38 G: 3; .375 INJECTION, POWDER, LYOPHILIZED, FOR SOLUTION INTRAVENOUS at 10:31

## 2022-07-14 RX ADMIN — CELECOXIB 100 MG: 100 CAPSULE ORAL at 08:09

## 2022-07-14 RX ADMIN — PIPERACILLIN AND TAZOBACTAM 3.38 G: 3; .375 INJECTION, POWDER, LYOPHILIZED, FOR SOLUTION INTRAVENOUS at 02:10

## 2022-07-14 RX ADMIN — ACETAMINOPHEN 1000 MG: 500 TABLET ORAL at 12:32

## 2022-07-14 RX ADMIN — ASPIRIN 325 MG: 325 TABLET, COATED ORAL at 08:09

## 2022-07-14 RX ADMIN — ACETAMINOPHEN 1000 MG: 500 TABLET ORAL at 17:35

## 2022-07-14 NOTE — PROGRESS NOTES
[address] [facility] SURGERY PROGRESS NOTE          Chief Complaint: Fall with left proximal femur intertrochanteric fracture     Objective:    Mr. Pablo Neely is a 80y.o. year old * male presents to ER after a ground level fall on Saturday,  followed by loss of consciousness and was brought here on Monday. No fever or chills, nausea or vomiting. S/p open reduction and internal fixation of left proximal femur intertrochanteric fracture, with Gamma Nail intramedullary device. POD #1. Patient is progressing well. Sitting upright in chair. Will eventually progress to ambulatory trial. Currently being followed by ortho for post op care. Patient has been accepted to [facility] once medically cleared. Cognitive function has improved back to baseline. Denies any left side chest pain or shortness of breath. Past Medical History:   Past Medical History:   Diagnosis Date    Arthritis     Asthma     Burning with urination     Cancer [facility])     Prostate Finished radiation 3/2012    COPD     Essential tremor     GERD (gastroesophageal reflux disease)     Hyperlipidemia     Hypertension     [facility]peripheral [facility]) ([facility])        Past Surgical History:   Past Surgical History:   Procedure Laterality Date    HC STNT SFA VIBN WLGO -H1  12/12/2005    LEFT Superficial Femoral Arter Stent    HX ENDOSCOPY      HX GI      Bleeding ulcers    HX HEART CATHETERIZATION      HX HEENT      L eye    HX HEENT      Cataract    HX ORTHOPAEDIC      R & L Carpal tunnel    HX OTHER SURGICAL      Barrets esophagus    HX UROLOGICAL  05/11/2011    Prostate Bx    HX UROLOGICAL      Brachytherapy    HX UROLOGICAL  05/11/2011    cysto, BRP        Allergy:  Allergies   Allergen Reactions    Nut - Unspecified Not Reported This Time       Social History:  reports that he has quit smoking. He has never used smokeless tobacco. He reports previous alcohol use. He reports that he does not use drugs.      Family History:  Family History   Problem Relation Age of Onset    Diabetes Mother     Diabetes Sister     Diabetes Brother         Current Medications:  Current Facility-Administered Medications:     lidocaine (PF) (XYLOCAINE) 10 mg/mL (1 %) injection 0.1 mL, 0.1 mL, SubCUTAneous, PRN, Danny Fairchild MD    sodium chloride (NS) flush 5-40 mL, 5-40 mL, IntraVENous, PRN, Danny Fairchild MD    acetaminophen (TYLENOL) tablet 1,000 mg, 1,000 mg, Oral, Q6H, Danny Fairchild MD, 1,000 mg at 07/13/22 1223    traMADoL (ULTRAM) tablet 25 mg, 25 mg, Oral, Q6H PRN, Danny Fairchild MD    oxyCODONE IR (ROXICODONE) tablet 2.5 mg, 2.5 mg, Oral, Q4H PRN, Danny Fairchild MD    aspirin delayed-release tablet 325 mg, 325 mg, Oral, BID, Danny Fairchild MD, 325 mg at 07/13/22 2049    celecoxib (CELEBREX) capsule 100 mg, 100 mg, Oral, BID, Danny Fairchild MD, 100 mg at 07/13/22 2049    [COMPLETED] piperacillin-tazobactam (ZOSYN) 4.5 g in 0.9% sodium chloride (MBP/ADV) 100 mL MBP, 4.5 g, IntraVENous, ONCE, Last Rate: 200 mL/hr at 07/11/22 1202, 4.5 g at 07/11/22 1202 **FOLLOWED BY** piperacillin-tazobactam (ZOSYN) 3.375 g in 0.9% sodium chloride (MBP/ADV) 100 mL MBP, 3.375 g, IntraVENous, Q8H, Danny Fairchild MD, Last Rate: 25 mL/hr at 07/13/22 1721, 3.375 g at 07/13/22 1721    polyethylene glycol (MIRALAX) packet 17 g, 17 g, Oral, DAILY PRN, Danny Fairchild MD    ondansetron (ZOFRAN ODT) tablet 4 mg, 4 mg, Oral, Q8H PRN **OR** ondansetron (ZOFRAN) injection 4 mg, 4 mg, IntraVENous, Q6H PRN, Danny Fairchild MD    budesonide-formoteroL (SYMBICORT) 160-4.5 mcg/actuation HFA inhaler 2 Puff, 2 Puff, Inhalation, BID, Danny Fairchild MD, 2 Puff at 07/13/22 0748    albuterol-ipratropium (DUO-NEB) 2.5 MG-0.5 MG/3 ML, 3 mL, Nebulization, Q4H PRN, Danny Fairchild MD    levoFLOXacin (LEVAQUIN) 750 mg in D5W IVPB, 750 mg, IntraVENous, Q48H, Danny Fairchild MD, Last Rate: 100 mL/hr at 07/13/22 1549, 750 mg at 07/13/22 1549     Immunization History:   Most Recent Immunizations   Administered Date(s) Administered    Influenza Vaccine 09/21/2015      Complete    Review of Systems: Unable  To complete    Physical Exam:     Vitals & Measurements: Wt Readings from Last 3 Encounters:   07/13/22 74.7 kg (164 lb 10.9 oz)   08/14/20 75.3 kg (166 lb)   09/26/19 75.3 kg (166 lb)     Temp Readings from Last 3 Encounters:   07/13/22 98.3 °F (36.8 °C)   08/14/20 98.1 °F (36.7 °C) (Oral)   07/16/20 97.7 °F (36.5 °C) (Oral)     BP Readings from Last 3 Encounters:   07/13/22 102/62   08/14/20 127/79   07/16/20 144/85     Pulse Readings from Last 3 Encounters:   07/13/22 85   06/06/12 80   12/07/11 78      Ht Readings from Last 3 Encounters:   07/11/22 5' 10\" (1.778 m)   08/14/20 5' 5\" (1.651 m)   07/16/20 5' 9\" (1.753 m)          General: well appearing, no acute distress, confused   Head: Normal  Face: Nornal  HEENT: atraumatic, PERRLA, moist mucosa, normal pharynx, normal tonsils and adenoids, normal tongue, no fluid in sinuses  Neck: Trachea midline, no carotid bruit, no masses  Chest: Normal.  Respiratory: Normal chest wall expansion, CTA B, no r/r/w, no rubs  Cardiovascular: RRR, no m/r/g, Normal S1 and S2  Abdomen: Soft, non tender, non-distended, normal bowel sounds in all quadrants, no hepatosplenomegaly, no tympany. Genitourinary: No inguinal hernia, normal external gentalia, Testis & scrotum normal, no renal angle tenderness  Rectal: deferred  Musculoskeletal: normal ROM in upper and lower extremities, No joint swelling. Surgical dressing over left hip incision.    Integumentary: Warm, dry, and pink, with no rash, purpura, or petechia  Heme/Lymph: No lymphadenopathy, no bruises  Neurological:Cranial Nerves II-XII grossly intact, no gross motor or sensory deficit  Psychiatric: alert but not oriented, confused but at patient's reported baseline       Laboratory Values:   Recent Results (from the past 24 hour(s))   CBC WITH AUTOMATED DIFF    Collection Time: 07/13/22  5:40 AM   Result Value Ref Range    WBC 13.2 (H) 4.1 - 11.1 K/uL    RBC 2.73 (L) 4.10 - 5.70 M/uL    HGB 8.3 (L) 12.1 - 17.0 g/dL    HCT 26.4 (L) 36.6 - 50.3 %    MCV 96.7 80.0 - 99.0 FL    MCH 30.4 26.0 - 34.0 PG    MCHC 31.4 30.0 - 36.5 g/dL    RDW 15.0 (H) 11.5 - 14.5 %    PLATELET 662 447 - 309 K/uL    MPV 11.6 8.9 - 12.9 FL    NRBC 0.0 0.0  WBC    ABSOLUTE NRBC 0.00 0.00 - 0.01 K/uL    NEUTROPHILS 79 (H) 32 - 75 %    LYMPHOCYTES 7 (L) 12 - 49 %    MONOCYTES 13 5 - 13 %    EOSINOPHILS 0 0 - 7 %    BASOPHILS 0 0 - 1 %    IMMATURE GRANULOCYTES 1 (H) 0 - 0.5 %    ABS. NEUTROPHILS 10.5 (H) 1.8 - 8.0 K/UL    ABS. LYMPHOCYTES 1.0 0.8 - 3.5 K/UL    ABS. MONOCYTES 1.7 (H) 0.0 - 1.0 K/UL    ABS. EOSINOPHILS 0.0 0.0 - 0.4 K/UL    ABS. BASOPHILS 0.0 0.0 - 0.1 K/UL    ABS. IMM.  GRANS. 0.1 (H) 0.00 - 0.04 K/UL    DF AUTOMATED     METABOLIC PANEL, BASIC    Collection Time: 07/13/22  5:40 AM   Result Value Ref Range    Sodium 144 136 - 145 mmol/L    Potassium 4.1 3.5 - 5.1 mmol/L    Chloride 114 (H) 97 - 108 mmol/L    CO2 23 21 - 32 mmol/L    Anion gap 7 5 - 15 mmol/L    Glucose 109 (H) 65 - 100 mg/dL    BUN 42 (H) 6 - 20 mg/dL    Creatinine 1.09 0.70 - 1.30 mg/dL    BUN/Creatinine ratio 39 (H) 12 - 20      GFR est AA >60 >60 ml/min/1.73m2    GFR est non-AA >60 >60 ml/min/1.73m2    Calcium 8.0 (L) 8.5 - 10.1 mg/dL   CK    Collection Time: 07/13/22  5:40 AM   Result Value Ref Range     (H) 39 - 308 U/L   ECHO ADULT COMPLETE    Collection Time: 07/13/22  2:40 PM   Result Value Ref Range    LV EDV A2C 12 mL    LV EDV A4C 25 mL    LV ESV A2C 5 mL    LV ESV A4C 14 mL    IVSd 0.7 0.6 - 1.0 cm    LVIDd 3.9 (A) 4.2 - 5.9 cm    LVIDs 3.4 cm    LVOT Diameter 2.0 cm    LVOT Mean Gradient 2 mmHg    LVOT VTI 15.7 cm    LVOT Peak Velocity 0.9 m/s    LVOT Peak Gradient 3 mmHg    LVPWd 0.9 0.6 - 1.0 cm    LV E' Lateral Velocity 11 cm/s    LV E' Septal Velocity 11 cm/s    LV Ejection Fraction A2C 56 %    LV Ejection Fraction A4C 46 %    LVOT Area 3.1 cm2    LVOT SV 49.3 ml    LA Major Axis 4.7 cm    LA Area 4C 12.1 cm2    LA Diameter 2.9 cm    AV Mean Gradient 6 mmHg    AV VTI 27.4 cm    AV Mean Velocity 1.2 m/s    AV Peak Velocity 1.8 m/s    AV Peak Gradient 13 mmHg    AV Area by VTI 1.8 cm2    AV Area by Peak Velocity 1.6 cm2    Aortic Root 2.9 cm    MR .0 cm    MV Mean Gradient 2 mmHg    MV VTI 31.7 cm    MV Mean Velocity 0.6 m/s    MR Peak Velocity 3.4 m/s    MR Peak Gradient 46 mmHg    MV Max Velocity 1.0 m/s    MV Peak Gradient 4 mmHg    MV E Wave Deceleration Time 216.0 ms    MV A Velocity 0.50 m/s    MV E Velocity 0.99 m/s    MV Area by VTI 1.6 cm2    PV Max Velocity 0.9 m/s    PV Peak Gradient 3 mmHg    TAPSE 2.0 1.7 cm    TR Max Velocity 3.08 m/s    TR Peak Gradient 38 mmHg    Fractional Shortening 2D 13 28 - 44 %    LV ESV Index A4C 7 mL/m2    LV EDV Index A4C 13 mL/m2    LV ESV Index A2C 3 mL/m2    LV EDV Index A2C 6 mL/m2    LVIDd Index 2.03 cm/m2    LVIDs Index 1.77 cm/m2    LV RWT Ratio 0.46     LV Mass 2D 89.7 88 - 224 g    LV Mass 2D Index 46.7 (A) 49 - 115 g/m2    MV E/A 1.98     E/E' Ratio (Averaged) 9.00     E/E' Lateral 9.00     E/E' Septal 9.00     LVOT Stroke Volume Index 25.7 mL/m2    LA Size Index 1.51 cm/m2    LA/AO Root Ratio 1.00     Ao Root Index 1.51 cm/m2    AV Velocity Ratio 0.50     LVOT:AV VTI Index 0.57     LOKI/BSA VTI 0.9 cm2/m2    LOKI/BSA Peak Velocity 0.8 cm2/m2    MV:LVOT VTI Index 2.02     LA Volume 4C 24 18 - 58 mL    LA Volume Index 4C 13 16 - 34 mL/m2    Est. RA Pressure 3 mmHg    RVSP 41 mmHg           XR PELV AP ONLY   Final Result   ORIF left hip fracture. XR FLUOROSCOPY UNDER 60 MINUTES   Final Result   Fluoroscopic imaging during procedure. REPORT PROVIDED FOR COMPLIANCE ONLY AT NO CHARGE. XR SCAPULA RT   Final Result   No acute fracture. XR ELBOW LT MIN 3 V   Final Result   No acute fracture. Technical factors as above. XR FEMUR LT 2 V   Final Result   Unchanged angulated intertrochanteric left femur fracture. CT HEAD WO CONT   Final Result   No acute intracranial abnormality. There is a LEFT frontal approach  shunt   catheter in place with the tip to the LEFT of midline. CT SPINE CERV WO CONT   Final Result      1. No evidence of acute fracture or subluxation of the cervical spine. 2. Moderate to severe degenerative disc disease at C5-C6 and C6-C7. There is   multilevel right-sided moderate to severe facet arthritis. CT CHEST W CONT   Final Result   1. Comminuted intertrochanteric left femur fracture. Subacute-appearing right   11th and 12th rib fractures. Several Chronic-appearing left rib fractures. 2.  Fat stranding/edema/contusion in the left retroperitoneum. Suspected   intramuscular hematomas within the left iliopsoas and gluteal compartments. 3.  Right bladder mass, concerning for neoplasm. Nonemergent cystoscopy   recommended for further evaluation. 4.  Left upper lobe partially calcified nodule, not seen on prior exams. Attention on follow-up. 5.  Additional nonacute findings as above. CT ABD PELV W CONT   Final Result   1. Comminuted intertrochanteric left femur fracture. Subacute-appearing right   11th and 12th rib fractures. Several Chronic-appearing left rib fractures. 2.  Fat stranding/edema/contusion in the left retroperitoneum. Suspected   intramuscular hematomas within the left iliopsoas and gluteal compartments. 3.  Right bladder mass, concerning for neoplasm. Nonemergent cystoscopy   recommended for further evaluation. 4.  Left upper lobe partially calcified nodule, not seen on prior exams. Attention on follow-up. 5.  Additional nonacute findings as above. XR CHEST PORT   Final Result   No acute process. XR SHOULDER RT AP/LAT MIN 2 V   Final Result   No acute abnormality.       XR HIP RT W OR WO PELV 2-3 VWS   Final Result   1. Comminuted intertrochanteric left hip fracture         XR HIP LT W OR WO PELV 2-3 VWS   Final Result   1. Comminuted intertrochanteric anterior left hip fracture           Assessment:  Problem List Items Addressed This Visit     None      Visit Diagnoses     Intertrochanteric fracture of left hip, closed, initial encounter ([facility] [address]    -  Primary    Traumatic rhabdomyolysis, initial encounter ([facility] [address]        Hematoma of left iliopsoas muscle, initial encounter        Closed fracture of multiple ribs of left side, initial encounter        Mass of urinary bladder               Plan:    1. Admission to ortho unit   2. Diet - regular   3. IV fluids  4. SCD  5. IS  6. Pain medications  7. Antibiotics  8. Nausea medication  9. Labs in am  10. No acute Trauma/General Surgery issues. 11. Will sign off on care of patient        Thank you for the consultation & allowing me to participate in the care of this patient.

## 2022-07-14 NOTE — DISCHARGE INSTRUCTIONS
Patient Education        Surgery to Repair a Hip Fracture: What to Expect at Home  Your Recovery     Surgery for a hip fracture repairs a broken hip bone. When you leave the hospital after surgery, you will probably be walking with crutches or a walker. You may be able to climb a few stairs and get in and out of bed and chairs. But you will need someone to help you at home for the next few weeks or until you have more energy and can move around better. If there is no one to help you at home, you may go to a rehabilitation center or long-term care center. You will go home with a bandage and stitches or staples. You can remove the bandage when your doctor tells you to. Your doctor will remove your stitches or staples 10 days to 3 weeks after your surgery. You may still have some mild pain, and the area may be swollen for 3 to 4 months after surgery. Your doctor will give you medicine for the pain. You will continue the rehabilitation program (rehab) you started in the hospital. The better you do with your rehab exercises, the quicker you will get your strength and movement back. Most people are able to return to work 4 weeks to 4 months after surgery. But it may take 6 months to 1 year for you to fully recover. Some people, especially older people, are never able to move quite as well as they used to. You heal best when you take good care of yourself. Eat a variety of healthy foods, and don't smoke. This care sheet gives you a general idea about how long it will take for you to recover. But each person recovers at a different pace. Follow the steps below to get better as quickly as possible. How can you care for yourself at home? Activity    · Rest when you feel tired. You may take a nap, but don't stay in bed all day.     · Work with your physical therapist to learn the best way to exercise. You may be able to take frequent, short walks using crutches or a walker.  You will probably have to use crutches or a walker for at least 4 to 6 weeks. After that, you may need to use a cane to help you walk.     · Do not sit for longer than 30 to 45 minutes at a time. When you sit, use chairs with arms, and don't sit in low chairs.     · Sleep on your back with your legs slightly apart or on your side with a pillow between your knees for about 6 weeks or as your doctor tells you. Don't sleep on your stomach or affected hip.     · You may need to take sponge baths until your stitches or staples have been removed. You will probably be able to shower 24 hours after they are removed. Ask your doctor when it is okay to bathe or shower.     · Ask your doctor when you can drive again.     · Most people are able to return to work 4 weeks to 4 months after surgery.     · Ask your doctor when it is okay for you to have sex.     · Do not lift anything that would make you strain. This may include heavy grocery bags and milk containers, a heavy briefcase or backpack, cat litter or dog food bags, a vacuum , or a child. Diet    · By the time you leave the hospital, you will probably be eating your normal diet. If your stomach is upset, try bland, low-fat foods like plain rice, broiled chicken, toast, and yogurt. Your doctor may recommend that you take iron and vitamin supplements.     · Continue to drink plenty of fluids.     · Eat healthy foods, and watch your portion sizes. Try to stay at your ideal weight. Too much weight puts more stress on your hip joint.     · You may notice that your bowel movements are not regular right after your surgery. This is common. Try to avoid constipation and straining with bowel movements. You may want to take a fiber supplement every day. If you have not had a bowel movement after a couple of days, ask your doctor about taking a mild laxative.     · Your doctor may want you to take calcium supplements and eat foods high in calcium, such as milk, cheese, ice cream, and salmon with bones.  These help stop bone loss. Orange juice and soy milk with added calcium are also good choices. Medicines    · Your doctor will tell you if and when you can restart your medicines. You will also be given instructions about taking any new medicines.     · If you take aspirin or some other blood thinner, ask your doctor if and when to start taking it again. Make sure that you understand exactly what your doctor wants you to do.     · Your doctor may give you a blood-thinning medicine to prevent blood clots. If you take a blood thinner, be sure you get instructions about how to take your medicine safely. Blood thinners can cause serious bleeding problems. This medicine could be in pill form or as a shot (injection). If a shot is necessary, your doctor will tell you how to do this.     · Be safe with medicines. Take pain medicines exactly as directed. ? If the doctor gave you a prescription medicine for pain, take it as prescribed. ? If you are not taking a prescription pain medicine, ask your doctor if you can take an over-the-counter medicine.     · If you think your pain medicine is making you sick to your stomach:  ? Take your medicine after meals (unless your doctor has told you not to). ? Ask your doctor for a different pain medicine.     · If your doctor prescribed antibiotics, take them as directed. Do not stop taking them just because you feel better. You need to take the full course of antibiotics.     · Your doctor may also prescribe medicines or calcium supplements to make your bones stronger. Incision care    · You will have a bandage over the cut (incision) and staples or stitches. If there is no drainage, most doctors will let you take the bandage off in a few days.     · Your doctor will remove the staples or stitches 10 days to 3 weeks after the surgery and replace them with strips of tape. Leave the tape on for a week or until it falls off.    Exercise    · Your rehab program will include a number of exercises to do. Always do them as your therapist tells you.     · Do not do any vigorous exercise for 12 weeks or until your doctor tells you it is okay. Ice and elevation    · For pain, put ice or a cold pack on the area for 10 to 20 minutes at a time. Put a thin cloth between the ice and your skin.     · Your ankle may swell for about 3 months. Prop up your ankle when you ice it or anytime you sit or lie down. Try to keep it above the level of your heart. This will help reduce swelling. Other instructions    · Continue to wear your support stockings as your doctor says. These help to prevent blood clots. The length of time that you will have to wear them depends on your activity level and the amount of swelling you have. Most people wear these stockings for 4 to 6 weeks after surgery.     · Follow these tips to prevent falls:  ? Arrange furniture so that you won't trip on it. ? Get rid of throw rugs, and move electrical cords out of the way. ? Walk only in areas with plenty of light. ? Put grab bars in showers and bathtubs. ? Avoid icy or snowy sidewalks. ? Wear shoes with sturdy, flat soles. Follow-up care is a key part of your treatment and safety. Be sure to make and go to all appointments, and call your doctor if you are having problems. It's also a good idea to know your test results and keep a list of the medicines you take. When should you call for help? Call 911 anytime you think you may need emergency care. For example, call if:    · You passed out (lost consciousness).     · You have severe trouble breathing.     · You have sudden chest pain and shortness of breath, or you cough up blood. Call your doctor now or seek immediate medical care if:    · Your leg or foot is cool or pale or changes color.     · You cannot feel or move your leg.     · You have signs of a blood clot, such as:  ? Pain in your calf, back of the knee, thigh, or groin. ? Redness and swelling in your leg or groin.   · Your incision comes open and begins to bleed, or the bleeding increases.     · You feel like your heart is racing or beating irregularly.     · You have signs of infection, such as:  ? Increased pain, swelling, warmth, or redness. ? Red streaks leading from the incision. ? Pus draining from the incision. ? A fever. Watch closely for any changes in your health, and be sure to contact your doctor if:    · You do not have a bowel movement after taking a laxative.     · You do not get better as expected. Where can you learn more? Go to http://www.gray.com/  Enter X851 in the search box to learn more about \"Surgery to Repair a Hip Fracture: What to Expect at Home. \"  Current as of: July 1, 2021               Content Version: 13.2  © 7938-6786 [employer]. Care instructions adapted under license by [employer] (which disclaims liability or warranty for this information). If you have questions about a medical condition or this instruction, always ask your healthcare professional. [place] [address] any warranty or liability for your use of this information.

## 2022-07-14 NOTE — PROGRESS NOTES
Orthopedic progress note    Date:2022       Room:[facility] [place]  Patient Name:Jean Claude Leonard     Date of Birth:[birth year]     Age:89 y.o. Subjective    80year-old male status post ORIF left hip. Postop day #2. Patient doing well. He appears comfortable in bed. He is awake alert and oriented x3. He has no complaints of pain at this time. Slow progress with therapy. No other complaints at this time.     Objective           Vitals Last 24 Hours:  TEMPERATURE:  Temp  Av.2 °F (36.8 °C)  Min: 97.4 °F (36.3 °C)  Max: 99.2 °F (37.3 °C)  RESPIRATIONS RANGE: Resp  Av.8  Min: 16  Max: 20  PULSE OXIMETRY RANGE: SpO2  Av %  Min: 92 %  Max: 99 %  PULSE RANGE: Pulse  Av.7  Min: 70  Max: 89  BLOOD PRESSURE RANGE: Systolic (78BAD), YOY:238 , Min:102 , NQI:178   ; Diastolic (66GRN), UJA:66, Min:52, Max:62    Current Facility-Administered Medications   Medication Dose Route Frequency    lidocaine (PF) (XYLOCAINE) 10 mg/mL (1 %) injection 0.1 mL  0.1 mL SubCUTAneous PRN    sodium chloride (NS) flush 5-40 mL  5-40 mL IntraVENous PRN    acetaminophen (TYLENOL) tablet 1,000 mg  1,000 mg Oral Q6H    traMADoL (ULTRAM) tablet 25 mg  25 mg Oral Q6H PRN    oxyCODONE IR (ROXICODONE) tablet 2.5 mg  2.5 mg Oral Q4H PRN    aspirin delayed-release tablet 325 mg  325 mg Oral BID    celecoxib (CELEBREX) capsule 100 mg  100 mg Oral BID    piperacillin-tazobactam (ZOSYN) 3.375 g in 0.9% sodium chloride (MBP/ADV) 100 mL MBP  3.375 g IntraVENous Q8H    polyethylene glycol (MIRALAX) packet 17 g  17 g Oral DAILY PRN    ondansetron (ZOFRAN ODT) tablet 4 mg  4 mg Oral Q8H PRN    Or    ondansetron (ZOFRAN) injection 4 mg  4 mg IntraVENous Q6H PRN    budesonide-formoteroL (SYMBICORT) 160-4.5 mcg/actuation HFA inhaler 2 Puff  2 Puff Inhalation BID    albuterol-ipratropium (DUO-NEB) 2.5 MG-0.5 MG/3 ML  3 mL Nebulization Q4H PRN    levoFLOXacin (LEVAQUIN) 750 mg in D5W IVPB  750 mg IntraVENous Q48H      Review of Systems Constitutional: Negative for malaise/fatigue. Respiratory: Negative for cough, shortness of breath and wheezing. Cardiovascular: Negative for chest pain and palpitations. Gastrointestinal: Negative for abdominal pain, heartburn, nausea and vomiting. Neurological: Negative for headaches. Musculoskeletal: Denies any numbness/tingling of operative extremity    I/O (24Hr): Intake/Output Summary (Last 24 hours) at 7/14/2022 0824  Last data filed at 7/14/2022 0308  Gross per 24 hour   Intake 720 ml   Output 701 ml   Net 19 ml     Objective  Labs/Imaging/Diagnostics    Labs:  CBC:  Recent Labs     07/13/22  0540 07/12/22  0624 07/11/22  1030   WBC 13.2* 12.7* 20.6*   RBC 2.73* 2.91* 3.94*   HGB 8.3* 8.9* 11.9*   HCT 26.4* 27.7* 37.0   MCV 96.7 95.2 93.9   RDW 15.0* 15.0* 14.8*    144* 175     CHEMISTRIES:  Recent Labs     07/13/22  0540 07/12/22  0624 07/11/22  1100 07/11/22  1030    144  --  139   K 4.1 4.0  --  5.1   * 113*  --  104   CO2 23 23  --  28   BUN 42* 44*  --  43*   CREA 1.09 1.07  --  1.10   CA 8.0* 8.0*  --  9.2   MG  --  2.2 2.6*  --    PT/INR:  Recent Labs     07/11/22  1115   INR 1.6*     APTT:No results for input(s): APTT in the last 72 hours. LIVER PROFILE:  Recent Labs     07/12/22  0624 07/11/22  1030   AST 58* 104*   ALT 24 35     Lab Results   Component Value Date/Time    ALT (SGPT) 24 07/12/2022 06:24 AM    AST (SGOT) 58 (H) 07/12/2022 06:24 AM    Alk. phosphatase 48 07/12/2022 06:24 AM    Bilirubin, total 0.6 07/12/2022 06:24 AM       Physical Exam:  Left hip: Dressing was removed by me. Incision sites are healing well. Staples remain in place. Mild swelling seen left thigh. No tenderness palpation throughout left thigh. No calf pain to palpation. EHL/DF/PF 3 out of 5. DP/PT pulse are palpable. Negative Homans. Left lower extremity neurovascular intact.     Assessment//Plan           Patient Active Problem List    Diagnosis Date Noted    Rhabdomyolysis 07/11/2022    Fracture, hip ([facility] 07/11/2022    Benign essential hypertension 08/04/2021    Dyspnea 08/04/2021    Hyperlipidemia 08/04/2021    Tremor 08/04/2021    Enlarged prostate with urinary obstruction 07/27/2020    Malignant neoplasm of prostate ([facility] 07/27/2020    Renal cyst 07/27/2020    Chronic prostatitis 07/27/2020    PAD (peripheral artery disease) ([facility] 06/08/2011    Pure hypercholesterolemia 06/08/2011     Status post ORIF left hip. Postop day #2. New Aquacel Ag dressing placed today. Continue with therapy as tolerated. Acute blood loss anemia. Asymptomatic. Continue to monitor. Continue with aspirin for DVT prophylaxis. Okay to discharge from orthopedics when cleared by medicine and placement has been made. Patient to follow-up with Dr. Jasbir Larson as outpatient in approximately 2 weeks.       Electronically signed by Rusty Prakash PA-C on 7/14/2022 at 8:24 AM

## 2022-07-14 NOTE — PROGRESS NOTES
[facility] here to [facility]. All lines and drains removed and discontinued. IV sites removed. All personal belongings removed by family member Mishel Romero. Patient left with both upper and lower dentures, eye glasses and personal clothing. Patient thanked staff for care given.  Report called to [facility] to

## 2022-07-14 NOTE — PROGRESS NOTES
Patient accepted to go to [facility] today and they can accept at 4pm. CM will notify patient and patients family.

## 2022-07-14 NOTE — PROGRESS NOTES
CM called patients primary decision maker, Ronny Dash (nijessica) to notify of patients DC today to [facility]. No answer, CONRAD left VM. CM informed by patients nurse that nijessica is on a plane right now coming from [place]. She states that the niece should be here at 3pm.     CM arranged transport for [employer] Notice for 5pm. CM will follow-up with niece closer to 4pm to notify her of patients DC to [facility]. Nurse can call report to [phone] and room number will be given out during report. Discharge plan of care/case management plan validated with provider discharge order. --------------------------------    [id number]- CM received a call back from Dolly Sheldon. CM notified niece of patients DC to [facility] and transportation to be provided by [facility] at [address] [facility]. Niece verbalized understanding.

## 2022-07-14 NOTE — ROUTINE PROCESS
{[facility] BEDSIDE_VERBAL_ shift change report given to Elizabeth Moeller RN (oncoming nurse) by Nuzhat Troncoso RN (offgoing nurse).  Report included the information from [facility]

## 2022-07-14 NOTE — PROGRESS NOTES
SPEECH LANGUAGE PATHOLOGY DYSPHAGIA TREATMENT  Patient: Madiha Vicente (89 y.o. male)  Date: 7/14/2022  Diagnosis: Fracture, hip ([facility] [S72.009A]  Rhabdomyolysis [M62.82] Fracture, hip (HCC)  Procedure(s) (LRB):  FEMUR GAMMA NAIL INSERTION (Left) 2 Days Post-Op  Precautions:      ASSESSMENT :  Patient is A&Ox2 sitting in chair eating breakfast upon arrival. Patient w/ tangential speech w/ talking while mastication despite verbal prompts not to speak while eating. Patient noted w/ b/l UE tremulous movement and difficulty w/ self feeding. OT following. PO trials of thin via straw and soft solids. Oral phase c/b reduced mastication and organization of bolus w/ solids s/t talking while eating. Pharyngeal phase appears largely wfl. Would recommend not to engage in conversation during PO intake. No overt s/s of pen/asp observed. 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 PLAN :  Recommendations and Planned Interventions:  Rec cont soft and bite sized diet w/ thin liquids and strict asp/GERD precautions. Cont slow rate of itnake, small bites/sips, liquid wash, and remain upright 30 mins after PO intake. Frequency/Duration: Patient will be followed by speech-language pathology 3 times a week to address goals. Discharge Recommendations: To Be Determined     SUBJECTIVE:   Patient perseverating on where he placed the efferdent.      OBJECTIVE:     Past Medical History:   Diagnosis Date    Arthritis     Asthma     Burning with urination     Cancer [facility])     Prostate Finished radiation 3/2012    COPD     Essential tremor     GERD (gastroesophageal reflux disease)     Hyperlipidemia     Hypertension     PAD (peripheral artery disease) ([facility])        Current Diet:  DIET ADULT     Cognitive and Communication Status:  Neurologic State: Alert,Confused  Orientation Level: Oriented to person,Oriented to place  Cognition: Follows commands  Safety/Judgement: Decreased awareness of need for safety                 Pain:  Pain Scale 1: FACES  Pain Intensity 1: 0       After treatment:   Patient left in no apparent distress sitting up in chair, Call bell within reach, Nursing notified, and Bed / chair alarm activated    COMMUNICATION/EDUCATION:   Patient was educated regarding diet recs, s/s aspiration , aspiration precautions and swallow strategies. He demonstrated Fair understanding as evidenced by confusion. Problem: Dysphagia (Adult)  Goal: *Acute Goals and Plan of Care (Insert Text)  Description: Speech Therapy Swallow Goals  Initiated 7/11/2022  -Patient will tolerate SBS diet with thin liquids without clinical indicators of aspiration given minimal cues within 5-7day(s). -Patient will tolerate PO trials without clinical indicators of aspiration given minimal cues within 5-7day(s). -Patient will participate in modified barium swallow study within 5-7 day(s). -Patient will demonstrate understanding of swallow safety precautions and aspiration precautions, diet recs with minimal cues within 5-7 day(s). -Patient/caregiver goal: \"to eat safely. \"          Outcome: Progressing Towards Goal       Thank you for this referral.  Gennaro Lagunas M.S., M.Ed., CCC-SLP  Time Calculation: 12 mins

## 2022-07-14 NOTE — PROGRESS NOTES
PHYSICAL THERAPY TREATMENT  Patient: Denis Wick (1 y.o. male)  Date: 7/14/2022  Diagnosis: Fracture, hip ([facility] [S72.009A]  Rhabdomyolysis [M62.82] Fracture, hip (HCC)  Procedure(s) (LRB):  FEMUR GAMMA NAIL INSERTION (Left) 2 Days Post-Op  Precautions:    Chart, physical therapy assessment, plan of care and goals were reviewed. ASSESSMENT  Patient continues with skilled PT services and is progressing towards goals. Patient progressing well overall but req some cues for redirection to tasks. Patient was mod Ax2 for bed mobility, mod A x2 for sit to stand from bed x2 reps. Patient needed to sit back down on bed pan as BSC not available. Patient demos good seated EOB balance and then performed static standing while therapists assisted with pericare. Patient then amb approx 3 ft to the recliner mod A x2 with difficulty advancing LLE and demos very flexed posture, with unsteady gait req recliner be brought up closer behind pt. Patient progressing today req less assistance overall but still slow progress with ambulation. Will cont to progress towards goals, rec d/c to IRF. Current Level of Function Impacting Discharge (mobility/balance): weakness, unsteady     Other factors to consider for discharge: safety, assist at home, falls         PLAN :  Patient continues to benefit from skilled intervention to address the above impairments. Continue treatment per established plan of care. to address goals. Recommendation for discharge: (in order for the patient to meet his/her long term goals)  [address] [facility]     This discharge recommendation:  Has been made in collaboration with the attending provider and/or case management    IF patient discharges home will need the following DME: to be determined (TBD)       SUBJECTIVE:   Patient stated I want to eat breakfast before it gets cold.     OBJECTIVE DATA SUMMARY:   Critical Behavior:  Neurologic State: Alert,Appropriate for age  Orientation Level: Appropriate for age,Oriented to person,Oriented to place  Cognition: Appropriate decision making,Follows commands  Safety/Judgement: Decreased awareness of need for safety  Functional Mobility Training:  Bed Mobility:  Rolling: Moderate assistance;Assist x2  Supine to Sit: Moderate assistance;Assist x2  Scooting: Moderate assistance;Assist x2    Transfers:  Sit to Stand: Moderate assistance;Assist x2  Stand to Sit: Moderate assistance;Assist x2  Bed to Chair: Moderate assistance; Additional time;Assist x2    Balance:  Sitting: Impaired; With support  Sitting - Static: Good (unsupported)  Sitting - Dynamic: Fair (occasional)  Standing: Impaired;Pull to stand; With support  Standing - Static: Constant support; Fair  Standing - Dynamic : Constant support;Poor    Ambulation/Gait Training:  Distance (ft): 3 Feet (ft)  Assistive Device: Gait belt;Walker, rolling  Ambulation - Level of Assistance: Moderate assistance;Assist x2  Speed/Ina: Shuffled; Slow  Step Length: Right shortened;Left shortened      Pain Rating:  FACES 5/10 with activity, reports 0/10 rest    Activity Tolerance:   Fair and requires rest breaks  Please refer to the flowsheet for vital signs taken during this treatment. After treatment patient left in no apparent distress:   Sitting in chair and Call bell within reach    COMMUNICATION/COLLABORATION:   The patients plan of care was discussed with: Occupational therapy assistant and Registered nurse. Cotx with BEJARANO for increased safety. Elier Bishop   Time Calculation: 32 mins         Problem: Mobility Impaired (Adult and Pediatric)  Goal: *Acute Goals and Plan of Care (Insert Text)  Description: Patient stated goal:get better  Patient will move from supine to sit and sit to supine , scoot up and down, and roll side to side in bed with minimal assistance/contact guard assist within 7 day(s).     Patient will transfer from bed to chair and chair to bed with minimal assistance/contact guard assist using the least restrictive device within 7 day(s). Patient will improve static standing balance to minimal assistance within 1 week(s). Patient will ambulate 25 feet with minimal assistance with least restrictive device within 1 weeks.      Outcome: Progressing Towards Goal

## 2022-07-14 NOTE — DISCHARGE SUMMARY
Admit date: 7/11/2022   Admitting Provider: Franklyn Wilkinson MD    Discharge date: 7/14/2022  Discharging Provider: Merrill Bingham NP      * Admission Diagnoses: Fracture, hip [facility]) [S72.009A]  Rhabdomyolysis [M62.82]    * Discharge Diagnoses:    Hospital Problems as of 7/14/2022 Date Reviewed: 8/14/2020          Codes Class Noted - Resolved POA    Rhabdomyolysis ICD-10-CM: M62.82  ICD-9-CM: 728.88  7/11/2022 - Present Unknown        * (Principal) Fracture, hip [facility]) ICD-10-CM: K37.753J  ICD-9-CM: 820.8  7/11/2022 - Present Unknown              * Hospital Course: 7/11/22 presents to hospital after being found down on the floor. Reportedly last seen at baseline two days prior. Patient is a poor historian and history is limited due to dementia versus encephalopathy. Patient reports falling and being on the floor for at least a day. During the ED course he reported right shoulder pain on palpation. Additional areas include both hips. He had several skin tears over the knees and the right hip. He underwent extensive trauma imaging and noted to have a hip fracture. Labs notable for markedly elevated CK. I have been asked to admit him to hospital for further stabilization and workup of his condition. 7/12 patient underwent ORIF. Patient stable for discharge. * Procedures:   Procedure(s): FEMUR GAMMA NAIL INSERTION      Consults: General Surgery and Orthopedic Surgery    Significant Diagnostic Studies:     CT Results  (Last 48 hours)                 07/11/22 1319   CT HEAD WO CONT Final result     Impression:   No acute intracranial abnormality. There is a LEFT frontal approach  shunt   catheter in place with the tip to the LEFT of midline.                Narrative:   EXAM: CT HEAD WO CONT       INDICATION: Fall       COMPARISON: CT head without contrast July 19, 2017. CONTRAST: None. TECHNIQUE: Unenhanced CT of the head was performed using 5 mm images. Brain and   bone windows were generated.  Coronal and sagittal reformats. CT dose reduction   was achieved through use of a standardized protocol tailored for this   examination and automatic exposure control for dose modulation. FINDINGS:   The ventricles and sulci are normal in size, shape and configuration. . There is   a LEFT frontal approach shunt catheter in place with the tip to the LEFT of   midline. There is no intracranial hemorrhage, extra-axial collection, or mass   effect. The basilar cisterns are open. No CT evidence of acute infarct. The bone windows demonstrate no abnormalities. The visualized portions of the   paranasal sinuses and mastoid air cells are clear.            07/11/22 1319   CT SPINE CERV WO CONT Final result     Impression:       1. No evidence of acute fracture or subluxation of the cervical spine. 2. Moderate to severe degenerative disc disease at C5-C6 and C6-C7. There is   multilevel right-sided moderate to severe facet arthritis.        Narrative:   EXAM:  CT CERVICAL SPINE WITHOUT CONTRAST       INDICATION: Fall. COMPARISON: None. CONTRAST:  None. TECHNIQUE: Multislice helical CT of the cervical spine was performed without   intravenous contrast administration. Sagittal and coronal reformats were   generated. CT dose reduction was achieved through use of a standardized   protocol tailored for this examination and automatic exposure control for dose   modulation. FINDINGS:       The alignment is within normal limits. There is no fracture or compression   deformity. The odontoid process is intact. The craniocervical junction is within   normal limits. Severe disc space narrowing and endplate degeneration are seen at   the C5-C6 and C6-C7 levels. There is bilateral, multilevel facet arthritis which   is worse on the RIGHT side. No bony canal narrowing is appreciated. The neck   soft tissues are grossly unremarkable. A left-sided  shunt catheter is noted.    Emphysematous changes are seen at the lung apices.            07/11/22 1319   CT CHEST W CONT Final result     Impression:   1. Comminuted intertrochanteric left femur fracture. Subacute-appearing right   11th and 12th rib fractures. Several Chronic-appearing left rib fractures. 2.  Fat stranding/edema/contusion in the left retroperitoneum. Suspected   intramuscular hematomas within the left iliopsoas and gluteal compartments. 3.  Right bladder mass, concerning for neoplasm. Nonemergent cystoscopy   recommended for further evaluation. 4.  Left upper lobe partially calcified nodule, not seen on prior exams. Attention on follow-up. 5.  Additional nonacute findings as above.                Narrative:   EXAM: CT CHEST W CONT, CT ABD PELV W CONT       INDICATION:    Chest: Ground-level fall; diffuse torso pain, poor historian; rule out traumatic   injury   Abdomen pelvis: Abdominal pain       COMPARISON: CT chest 10/27/2008, CT abdomen and pelvis 6/6/2011. TECHNIQUE: Helical CT of the chest, abdomen, and pelvis following the uneventful   intravenous administration of 100 mL Isovue-370. Oral contrast was utilized. Coronal and sagittal reformats were generated. CT dose reduction was achieved   through use of a standardized protocol tailored for this examination and   automatic exposure control for dose modulation. FINDINGS:       THYROID: No nodule. MEDIASTINUM: No mass or lymphadenopathy. ES: No mass or lymphadenopathy. THORACIC AORTA: No aneurysm. Atherosclerosis. MAIN PULMONARY ARTERY: Normal in caliber. HEART: Cardiomegaly. Coronary artery calcifications. ESOPHAGUS: Patulous with fluid and debris. TRACHEA/BRONCHI: Patent. PLEURA: No effusion or pneumothorax. LUNGS: Emphysema. No acute airspace disease. 6 mm centrally calcified left upper   lobe nodule, not seen on prior exams. .       Liver: No mass or biliary dilatation. Biliary tree: The gallbladder is within normal limits. The CBD is not dilated. Spleen: Calcified granulomas. Pancreas: No inflammation, mass or ductal dilatation. Adrenals: Unremarkable. Kidneys: No mass or hydronephrosis. A 0.7 cm x 6.4 cm bilobed left renal cyst   with a few thin partially calcified septations (Bosniak 2). Several additional   bilateral renal cysts and subcentimeter hypodensities are too small to   accurately characterize; no dedicated follow-up recommended   Stomach: Unremarkable. Small bowel: No dilatation or wall thickening. Colon: No dilatation or wall thickening. Diverticulosis. Appendix: Normal   Peritoneum: No ascites or pneumoperitoneum. Retroperitoneum: No lymphadenopathy or aortic aneurysm. Atherosclerosis. Fat   stranding/edema/contusion in the left retroperitoneum. Suspected intramuscular   hematomas within the left iliopsoas and gluteal compartments. Reproductive organs: Prostatic brachytherapy seeds. Urinary bladder: No mass or calculus. Bones: Several chronic-appearing left-sided rib fracture deformities. Subacute-chronic appearing posterior right 11-12 rib fractures. Comminuted   intertrochanteric left femur fracture. Degenerative changes. Grade 1   anterolisthesis of L4 on L5. 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 Abdominal wall: 2.2 cm x 1.2 cm right posterior eccentric mass (501-120). Additional comments: N/A            07/11/22 1319   CT ABD PELV W CONT Final result     Impression:   1. Comminuted intertrochanteric left femur fracture. Subacute-appearing right   11th and 12th rib fractures. Several Chronic-appearing left rib fractures. 2.  Fat stranding/edema/contusion in the left retroperitoneum. Suspected   intramuscular hematomas within the left iliopsoas and gluteal compartments. 3.  Right bladder mass, concerning for neoplasm. Nonemergent cystoscopy   recommended for further evaluation. 4.  Left upper lobe partially calcified nodule, not seen on prior exams. Attention on follow-up.    5.  Additional nonacute findings as above.                Narrative:   EXAM: CT CHEST W CONT, CT ABD PELV W CONT       INDICATION:    Chest: Ground-level fall; diffuse torso pain, poor historian; rule out traumatic   injury   Abdomen pelvis: Abdominal pain       COMPARISON: CT chest 10/27/2008, CT abdomen and pelvis 6/6/2011. TECHNIQUE: Helical CT of the chest, abdomen, and pelvis following the uneventful   intravenous administration of 100 mL Isovue-370. Oral contrast was utilized. Coronal and sagittal reformats were generated. CT dose reduction was achieved   through use of a standardized protocol tailored for this examination and   automatic exposure control for dose modulation. FINDINGS:       THYROID: No nodule. MEDIASTINUM: No mass or lymphadenopathy. ES: No mass or lymphadenopathy. THORACIC AORTA: No aneurysm. Atherosclerosis. MAIN PULMONARY ARTERY: Normal in caliber. HEART: Cardiomegaly. Coronary artery calcifications. ESOPHAGUS: Patulous with fluid and debris. TRACHEA/BRONCHI: Patent. PLEURA: No effusion or pneumothorax. LUNGS: Emphysema. No acute airspace disease. 6 mm centrally calcified left upper   lobe nodule, not seen on prior exams. .       Liver: No mass or biliary dilatation. Biliary tree: The gallbladder is within normal limits. The CBD is not dilated. Spleen: Calcified granulomas. Pancreas: No inflammation, mass or ductal dilatation. Adrenals: Unremarkable. Kidneys: No mass or hydronephrosis. A 0.7 cm x 6.4 cm bilobed left renal cyst   with a few thin partially calcified septations (Bosniak 2). Several additional   bilateral renal cysts and subcentimeter hypodensities are too small to   accurately characterize; no dedicated follow-up recommended   Stomach: Unremarkable. Small bowel: No dilatation or wall thickening. Colon: No dilatation or wall thickening. Diverticulosis. Appendix: Normal   Peritoneum: No ascites or pneumoperitoneum.    Retroperitoneum: No lymphadenopathy or aortic aneurysm. Atherosclerosis. Fat   stranding/edema/contusion in the left retroperitoneum. Suspected intramuscular   hematomas within the left iliopsoas and gluteal compartments. Reproductive organs: Prostatic brachytherapy seeds. Urinary bladder: No mass or calculus. Bones: Several chronic-appearing left-sided rib fracture deformities. Subacute-chronic appearing posterior right 11-12 rib fractures. Comminuted   intertrochanteric left femur fracture. Degenerative changes. Grade 1   anterolisthesis of L4 on L5. 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 Abdominal wall: 2.2 cm x 1.2 cm right posterior eccentric mass (501-120).    Additional comments: N/A          Recent Results (from the past 24 hour(s))   ECHO ADULT COMPLETE    Collection Time: 07/13/22  2:40 PM   Result Value Ref Range    LV EDV A2C 12 mL    LV EDV A4C 25 mL    LV ESV A2C 5 mL    LV ESV A4C 14 mL    IVSd 0.7 0.6 - 1.0 cm    LVIDd 3.9 (A) 4.2 - 5.9 cm    LVIDs 3.4 cm    LVOT Diameter 2.0 cm    LVOT Mean Gradient 2 mmHg    LVOT VTI 15.7 cm    LVOT Peak Velocity 0.9 m/s    LVOT Peak Gradient 3 mmHg    LVPWd 0.9 0.6 - 1.0 cm    LV E' Lateral Velocity 11 cm/s    LV E' Septal Velocity 11 cm/s    LV Ejection Fraction A2C 56 %    LV Ejection Fraction A4C 46 %    LVOT Area 3.1 cm2    LVOT SV 49.3 ml    LA Major Axis 4.7 cm    LA Area 4C 12.1 cm2    LA Diameter 2.9 cm    AV Mean Gradient 6 mmHg    AV VTI 27.4 cm    AV Mean Velocity 1.2 m/s    AV Peak Velocity 1.8 m/s    AV Peak Gradient 13 mmHg    AV Area by VTI 1.8 cm2    AV Area by Peak Velocity 1.6 cm2    Aortic Root 2.9 cm    MR .0 cm    MV Mean Gradient 2 mmHg    MV VTI 31.7 cm    MV Mean Velocity 0.6 m/s    MR Peak Velocity 3.4 m/s    MR Peak Gradient 46 mmHg    MV Max Velocity 1.0 m/s    MV Peak Gradient 4 mmHg    MV E Wave Deceleration Time 216.0 ms    MV A Velocity 0.50 m/s    MV E Velocity 0.99 m/s    MV Area by VTI 1.6 cm2    PV Max Velocity 0.9 m/s PV Peak Gradient 3 mmHg    TAPSE 2.0 1.7 cm    TR Max Velocity 3.08 m/s    TR Peak Gradient 38 mmHg    Fractional Shortening 2D 13 28 - 44 %    LV ESV Index A4C 7 mL/m2    LV EDV Index A4C 13 mL/m2    LV ESV Index A2C 3 mL/m2    LV EDV Index A2C 6 mL/m2    LVIDd Index 2.03 cm/m2    LVIDs Index 1.77 cm/m2    LV RWT Ratio 0.46     LV Mass 2D 89.7 88 - 224 g    LV Mass 2D Index 46.7 (A) 49 - 115 g/m2    MV E/A 1.98     E/E' Ratio (Averaged) 9.00     E/E' Lateral 9.00     E/E' Septal 9.00     LVOT Stroke Volume Index 25.7 mL/m2    LA Size Index 1.51 cm/m2    LA/AO Root Ratio 1.00     Ao Root Index 1.51 cm/m2    AV Velocity Ratio 0.50     LVOT:AV VTI Index 0.57     LOKI/BSA VTI 0.9 cm2/m2    LOKI/BSA Peak Velocity 0.8 cm2/m2    MV:LVOT VTI Index 2.02     LA Volume 4C 24 18 - 58 mL    LA Volume Index 4C 13 16 - 34 mL/m2    Est. RA Pressure 3 mmHg    RVSP 41 mmHg   GLUCOSE, POC    Collection Time: 07/14/22  6:08 AM   Result Value Ref Range    Glucose (POC) 96 65 - 117 mg/dL    Performed by Ludy Green        Discharge Exam:  Vitals and nursing note reviewed. Eyes:      Extraocular Movements: Extraocular movements intact. Cardiovascular:      Rate and Rhythm: Normal rate. Pulmonary:      Breath sounds: Normal breath sounds. Abdominal:      General: Bowel sounds are normal.   Musculoskeletal:         General: Signs of injury present. Comments: Left hip injury, limited ROM   Skin:     Comments: Left hip surgical dressing   Neurological:      Mental Status: He is alert. He is disoriented. Psychiatric:      Comments: Confused, pleasant, follows commands     * Discharge Condition: improved  * Disposition: [facility] ([facility])    Discharge Medications:  Current Discharge Medication List      START taking these medications    Details   celecoxib (CELEBREX) 100 mg capsule Take 1 Capsule by mouth two (2) times a day for 10 days.   Qty: 20 Capsule, Refills: 0  Start date: 7/14/2022, End date: 7/24/2022 oxyCODONE IR (ROXICODONE) 5 mg immediate release tablet Take 0.5 Tablets by mouth every four (4) hours as needed for Pain for up to 5 days. Max Daily Amount: 15 mg.  Qty: 15 Tablet, Refills: 0  Start date: 7/14/2022, End date: 7/19/2022    Associated Diagnoses: Intertrochanteric fracture of left hip, closed, initial encounter ([facility] [address]         CONTINUE these medications which have CHANGED    Details   aspirin delayed-release 325 mg tablet Take 1 Tablet by mouth daily for 30 days. Qty: 30 Tablet, Refills: 0  Start date: 7/14/2022, End date: 8/13/2022         CONTINUE these medications which have NOT CHANGED    Details   topiramate (TOPAMAX) 50 mg tablet Take  by mouth two (2) times a day.      ezetimibe (Zetia) 10 mg tablet Take  by mouth.      mometasone (NASONEX) 50 mcg/actuation nasal spray 2 Sprays daily. candesartan (ATACAND) 8 mg tablet Take  by mouth daily. psyllium (METAMUCIL SMOOTH TEXTURE) packet Take 1 Packet by mouth daily. cetirizine (ZYRTEC) 10 mg tablet Take  by mouth daily. gabapentin (NEURONTIN) 300 mg capsule Take 100 mg by mouth nightly. omega-3 fatty acids-vitamin e (FISH OIL) 1,000 mg Cap Take 1 Cap by mouth. fluticasone-salmeterol (ADVAIR DISKUS) 250-50 mcg/Dose diskus inhaler Take 1 Puff by inhalation every twelve (12) hours. simvastatin (ZOCOR) 40 mg tablet Take  by mouth nightly. alfuzosin (UROXATRAL) 10 mg SR tablet Take  by mouth daily. montelukast (SINGULAIR) 10 mg tablet Take 10 mg by mouth daily. esomeprazole (NEXIUM) 40 mg capsule Take  by mouth daily. albuterol (PROVENTIL VENTOLIN) 2.5 mg /3 mL (0.083 %) nebulizer solution by Nebulization route once. hydrocortisone (HYCORT) 1 % ointment Apply  to affected area two (2) times a day. use thin layer       Calcium Carbonate-Vit D3-Min (CALTRATE 600+D PLUS MINERALS) 600 mg (1,500 mg)-400 unit Chew Take  by mouth.       MV-Min-Folic Acid-Lutein (CENTRUM SILVER) 500-250 mcg Chew Take  by mouth. * Follow-up Care/Patient Instructions:   Activity: PT/OT Eval and Treat  Diet: Regular Diet  Wound Care: Keep wound clean and dry    Follow-up Information     Follow up With Specialties Details Why Contact Info    Jo Madera MD Orthopedic Surgery In 2 weeks  [address]  [phone]      Elfego Mazariegos MD Internal Medicine Physician   [address] [place] [address]      Jo Madera MD Orthopedic Surgery In 2 weeks  [address] [place] [address]  [phone]          Time Spent: > 35 minutes    Signed:  Shawanda English NP  7/14/2022  9:57 AM

## 2022-07-14 NOTE — PROGRESS NOTES
OCCUPATIONAL THERAPY TREATMENT  Patient: Melvin Aparicio (9 y.o. male)  Date: 7/14/2022  Diagnosis: Fracture, hip ([facility] [address] [S72.009A]  Rhabdomyolysis [M62.82] Fracture, hip (HCC)  Procedure(s) (LRB):  FEMUR GAMMA NAIL INSERTION (Left) 2 Days Post-Op  Precautions:    Chart, occupational therapy assessment, plan of care, and goals were reviewed. ASSESSMENT  Patient continues with skilled OT services and is progressing towards goals. Pt. Received semi-supine in bed and agreeable to tx session. Pt. Required cues throughout tx session for re-direction to task. Pt.  Performed bed mobility with Mod A x2 , sit> stand Mod A x2 with RW on 2 attempts. Pt. Stating need to use the bed pan, bed pan placed with total A. Pt. Required total A for posterior saadia-care while standing at RW. Pt. Then completed 3ft of functional ambulation with MOD A x2  With difficulty with advancing LLE and flexed posture noted. Pt. Unsteady and fatigues quickly and required therapist to place recliner behind pt. Pt. Sat in recliner. Pt set up with breakfast and with needs met. Pt. Required assistance with bringing cup to mouth d/t increased tremors noted. REC. IRF when medically appropriate for discharge. Current Level of Function Impacting Discharge (ADLs): assistance with OOB mobility and transfers, set-up for all ADL's    Other factors to consider for discharge: PLOF, time since on set         PLAN :  Patient continues to benefit from skilled intervention to address the above impairments. Continue treatment per established plan of care. to address goals.   Recommendation for discharge: (in order for the patient to meet his/her long term goals)  Therapy 3 hours per day 5-7 days per week    This discharge recommendation:  Has been made in collaboration with the attending provider and/or case management    IF patient discharges home will need the following DME: TBD       SUBJECTIVE:   Patient stated  I need help drinking out of my cup because my hands shake.     OBJECTIVE DATA SUMMARY:   Cognitive/Behavioral Status:  Neurologic State: Alert; Appropriate for age  Orientation Level: Appropriate for age;Oriented to person;Oriented to place  Cognition: Appropriate decision making; Follows commands  Functional Mobility and Transfers for ADLs:  Bed Mobility:  Rolling: Moderate assistance;Assist x2  Supine to Sit: Moderate assistance;Assist x2  Scooting: Moderate assistance;Assist x2    Transfers:  Sit to Stand: Moderate assistance;Assist x2  Functional Transfers  Toilet Transfer : Total assistance (bed pan placement)  Bed to Chair: Moderate assistance; Additional time;Assist x2    Balance:  Sitting: Impaired; With support  Sitting - Static: Good (unsupported)  Sitting - Dynamic: Fair (occasional)  Standing: Impaired;Pull to stand; With support  Standing - Static: Constant support; Fair  Standing - Dynamic : Constant support;Poor    ADL Intervention:  Feeding  Feeding Assistance: Set-up; Moderate assistance  Drink to Mouth: Set-up; Moderate assistance    Toileting  Toileting Assistance: Stand-by assistance  Bladder Hygiene: Total assistance (dependent)  Bowel Hygiene: Total assistance (dependent)    Pain:  0/ 10 pain at REST  Face scale 5/10 pain with mobility    Activity Tolerance:   Fair and requires rest breaks  Please refer to the flowsheet for vital signs taken during this treatment. After treatment patient left in no apparent distress:   Sitting in chair, Heels elevated for pressure relief, and Call bell within reach    COMMUNICATION/COLLABORATION:   The patients plan of care was discussed with: Physical therapy assistant and Registered nurse. Mireya Gale  Time Calculation: 32 mins    Problem: Self Care Deficits Care Plan (Adult)  Goal: *Acute Goals and Plan of Care (Insert Text)  Description: Patient stated goal: Walk to the bathroom. 1. Pt will be mod I sup <> sit in prep for EOB ADLs  2. Pt will be mod I grooming sitting EOB  3.  Pt will be mod I LE dressing sitting EOB/long sit  4. Pt will be mod I sit <>  prep for toileting LRAD  5. Pt will be mod I toileting/toilet transfer/cloth mgmt LRAD  6.  Pt will be IND following UE HEP in prep for self care tasks      Outcome: Progressing Towards Goal

## 2022-07-17 LAB
BACTERIA SPEC CULT: NORMAL
SPECIAL REQUESTS,SREQ: NORMAL

## 2022-07-19 ENCOUNTER — HOSPITAL ENCOUNTER (OUTPATIENT)
Dept: LAB | Age: 87
Discharge: HOME | End: 2022-07-19

## 2022-07-19 LAB
ANION GAP SERPL CALC-SCNC: 4 MMOL/L (ref 5–15)
BASOPHILS # BLD: 0 K/UL (ref 0–0.1)
BASOPHILS NFR BLD: 0 % (ref 0–1)
BUN SERPL-MCNC: 18 MG/DL (ref 6–20)
BUN/CREAT SERPL: 27 (ref 12–20)
CA-I BLD-MCNC: 8.6 MG/DL (ref 8.5–10.1)
CHLORIDE SERPL-SCNC: 111 MMOL/L (ref 97–108)
CO2 SERPL-SCNC: 28 MMOL/L (ref 21–32)
CREAT SERPL-MCNC: 0.66 MG/DL (ref 0.7–1.3)
DIFFERENTIAL METHOD BLD: ABNORMAL
EOSINOPHIL # BLD: 0.7 K/UL (ref 0–0.4)
EOSINOPHIL NFR BLD: 7 % (ref 0–7)
ERYTHROCYTE [DISTWIDTH] IN BLOOD BY AUTOMATED COUNT: 16.4 % (ref 11.5–14.5)
GLUCOSE SERPL-MCNC: 95 MG/DL (ref 65–100)
HCT VFR BLD AUTO: 25.8 % (ref 36.6–50.3)
HGB BLD-MCNC: 7.9 G/DL (ref 12.1–17)
IMM GRANULOCYTES # BLD AUTO: 0 K/UL
IMM GRANULOCYTES NFR BLD AUTO: 0 %
LYMPHOCYTES # BLD: 0.6 K/UL (ref 0.8–3.5)
LYMPHOCYTES NFR BLD: 6 % (ref 12–49)
MCH RBC QN AUTO: 30.3 PG (ref 26–34)
MCHC RBC AUTO-ENTMCNC: 30.6 G/DL (ref 30–36.5)
MCV RBC AUTO: 98.9 FL (ref 80–99)
MONOCYTES # BLD: 0.6 K/UL (ref 0–1)
MONOCYTES NFR BLD: 6 % (ref 5–13)
MYELOCYTES NFR BLD MANUAL: 3 %
NEUTS BAND NFR BLD MANUAL: 1 % (ref 0–6)
NEUTS SEG # BLD: 8 K/UL (ref 1.8–8)
NEUTS SEG NFR BLD: 77 % (ref 32–75)
NRBC # BLD: 0 K/UL (ref 0–0.01)
NRBC BLD-RTO: 0 PER 100 WBC
PLATELET # BLD AUTO: 317 K/UL (ref 150–400)
PMV BLD AUTO: 10.6 FL (ref 8.9–12.9)
POTASSIUM SERPL-SCNC: 4 MMOL/L (ref 3.5–5.1)
RBC # BLD AUTO: 2.61 M/UL (ref 4.1–5.7)
RBC MORPH BLD: ABNORMAL
SODIUM SERPL-SCNC: 143 MMOL/L (ref 136–145)
WBC # BLD AUTO: 10.3 K/UL (ref 4.1–11.1)

## 2022-07-19 PROCEDURE — 80048 BASIC METABOLIC PNL TOTAL CA: CPT

## 2022-07-19 PROCEDURE — 85025 COMPLETE CBC W/AUTO DIFF WBC: CPT

## 2022-08-01 ENCOUNTER — HOSPITAL ENCOUNTER (OUTPATIENT)
Dept: LAB | Age: 87
Discharge: HOME | End: 2022-08-01

## 2022-08-01 LAB — HGB BLD-MCNC: 9.5 G/DL (ref 12.1–17)

## 2022-08-01 PROCEDURE — 85018 HEMOGLOBIN: CPT

## 2022-11-04 ENCOUNTER — HOSPICE ADMISSION (OUTPATIENT)
Dept: HOSPICE | Facility: HOSPICE | Age: 87
End: 2022-11-04
Payer: OTHER MISCELLANEOUS

## 2022-11-18 PROCEDURE — 0655 HSPC INPATIENT RESPITE

## 2022-11-19 PROCEDURE — 0655 HSPC INPATIENT RESPITE

## 2022-11-20 PROCEDURE — 0655 HSPC INPATIENT RESPITE

## 2022-11-21 ENCOUNTER — HOSPITAL ENCOUNTER (INPATIENT)
Age: 87
LOS: 5 days | Discharge: HOME | End: 2022-11-26
Attending: FAMILY MEDICINE | Admitting: FAMILY MEDICINE

## 2022-11-21 PROCEDURE — 74011000250 HC RX REV CODE- 250: Performed by: FAMILY MEDICINE

## 2022-11-21 PROCEDURE — G0299 HHS/HOSPICE OF RN EA 15 MIN: HCPCS

## 2022-11-21 PROCEDURE — 74011250637 HC RX REV CODE- 250/637: Performed by: FAMILY MEDICINE

## 2022-11-21 PROCEDURE — 0655 HSPC INPATIENT RESPITE

## 2022-11-21 RX ORDER — MIDODRINE HYDROCHLORIDE 5 MG/1
2.5 TABLET ORAL
Status: DISCONTINUED | OUTPATIENT
Start: 2022-11-21 | End: 2022-11-26 | Stop reason: HOSPADM

## 2022-11-21 RX ORDER — ACETAMINOPHEN 325 MG/1
650 TABLET ORAL EVERY MORNING
Status: DISCONTINUED | OUTPATIENT
Start: 2022-11-22 | End: 2022-11-26 | Stop reason: HOSPADM

## 2022-11-21 RX ORDER — ACETAMINOPHEN 650 MG/1
650 SUPPOSITORY RECTAL
Status: DISCONTINUED | OUTPATIENT
Start: 2022-11-21 | End: 2022-11-26 | Stop reason: HOSPADM

## 2022-11-21 RX ORDER — LANOLIN ALCOHOL/MO/W.PET/CERES
4.5 CREAM (GRAM) TOPICAL
Status: DISCONTINUED | OUTPATIENT
Start: 2022-11-21 | End: 2022-11-26 | Stop reason: HOSPADM

## 2022-11-21 RX ORDER — QUETIAPINE FUMARATE 25 MG/1
12.5 TABLET, FILM COATED ORAL DAILY
Status: DISCONTINUED | OUTPATIENT
Start: 2022-11-22 | End: 2022-11-26 | Stop reason: HOSPADM

## 2022-11-21 RX ORDER — HYOSCYAMINE SULFATE 0.12 MG/1
0.12 TABLET SUBLINGUAL
Status: DISCONTINUED | OUTPATIENT
Start: 2022-11-21 | End: 2022-11-26 | Stop reason: HOSPADM

## 2022-11-21 RX ORDER — AMOXICILLIN AND CLAVULANATE POTASSIUM 875; 125 MG/1; MG/1
1 TABLET, FILM COATED ORAL EVERY 12 HOURS
Status: DISCONTINUED | OUTPATIENT
Start: 2022-11-21 | End: 2022-11-25

## 2022-11-21 RX ORDER — CETIRIZINE HYDROCHLORIDE 10 MG/1
10 TABLET ORAL DAILY
Status: DISCONTINUED | OUTPATIENT
Start: 2022-11-22 | End: 2022-11-26 | Stop reason: HOSPADM

## 2022-11-21 RX ORDER — BIOTIN 1 MG
1 TABLET ORAL EVERY MORNING
Status: DISCONTINUED | OUTPATIENT
Start: 2022-11-22 | End: 2022-11-26 | Stop reason: HOSPADM

## 2022-11-21 RX ORDER — PANTOPRAZOLE SODIUM 40 MG/1
40 TABLET, DELAYED RELEASE ORAL
Status: DISCONTINUED | OUTPATIENT
Start: 2022-11-21 | End: 2022-11-26 | Stop reason: HOSPADM

## 2022-11-21 RX ORDER — GABAPENTIN 100 MG/1
100 CAPSULE ORAL
Status: DISCONTINUED | OUTPATIENT
Start: 2022-11-21 | End: 2022-11-26 | Stop reason: HOSPADM

## 2022-11-21 RX ORDER — QUETIAPINE FUMARATE 25 MG/1
25 TABLET, FILM COATED ORAL
Status: DISCONTINUED | OUTPATIENT
Start: 2022-11-21 | End: 2022-11-26 | Stop reason: HOSPADM

## 2022-11-21 RX ORDER — SENNOSIDES 8.8 MG/5ML
5 LIQUID ORAL
Status: DISCONTINUED | OUTPATIENT
Start: 2022-11-21 | End: 2022-11-26 | Stop reason: HOSPADM

## 2022-11-21 RX ORDER — HALOPERIDOL 1 MG/1
1 TABLET ORAL
Status: DISCONTINUED | OUTPATIENT
Start: 2022-11-21 | End: 2022-11-26 | Stop reason: HOSPADM

## 2022-11-21 RX ORDER — FINASTERIDE 5 MG/1
5 TABLET, FILM COATED ORAL DAILY
Status: DISCONTINUED | OUTPATIENT
Start: 2022-11-22 | End: 2022-11-26 | Stop reason: HOSPADM

## 2022-11-21 RX ORDER — MORPHINE SULFATE 20 MG/ML
5 SOLUTION ORAL
Status: DISCONTINUED | OUTPATIENT
Start: 2022-11-21 | End: 2022-11-26 | Stop reason: HOSPADM

## 2022-11-21 RX ORDER — AMOXICILLIN 250 MG
2 CAPSULE ORAL
Status: DISCONTINUED | OUTPATIENT
Start: 2022-11-21 | End: 2022-11-26 | Stop reason: HOSPADM

## 2022-11-21 RX ORDER — PROMETHAZINE HYDROCHLORIDE 25 MG/1
25 TABLET ORAL
Status: DISCONTINUED | OUTPATIENT
Start: 2022-11-21 | End: 2022-11-26 | Stop reason: HOSPADM

## 2022-11-21 RX ORDER — LORAZEPAM 1 MG/1
1 TABLET ORAL
Status: DISCONTINUED | OUTPATIENT
Start: 2022-11-21 | End: 2022-11-21 | Stop reason: SDUPTHER

## 2022-11-21 RX ORDER — LORAZEPAM 1 MG/1
1 TABLET ORAL
Status: DISCONTINUED | OUTPATIENT
Start: 2022-11-21 | End: 2022-11-26 | Stop reason: HOSPADM

## 2022-11-21 RX ORDER — FACIAL-BODY WIPES
10 EACH TOPICAL DAILY PRN
Status: DISCONTINUED | OUTPATIENT
Start: 2022-11-21 | End: 2022-11-26 | Stop reason: HOSPADM

## 2022-11-21 RX ORDER — SERTRALINE HYDROCHLORIDE 50 MG/1
100 TABLET, FILM COATED ORAL DAILY
Status: DISCONTINUED | OUTPATIENT
Start: 2022-11-22 | End: 2022-11-26 | Stop reason: HOSPADM

## 2022-11-21 RX ADMIN — QUETIAPINE FUMARATE 25 MG: 25 TABLET ORAL at 20:49

## 2022-11-21 RX ADMIN — MIDODRINE HYDROCHLORIDE 2.5 MG: 5 TABLET ORAL at 18:00

## 2022-11-21 RX ADMIN — PANTOPRAZOLE SODIUM 40 MG: 40 TABLET, DELAYED RELEASE ORAL at 18:01

## 2022-11-21 RX ADMIN — AMOXICILLIN AND CLAVULANATE POTASSIUM 1 TABLET: 875; 125 TABLET, FILM COATED ORAL at 20:48

## 2022-11-21 RX ADMIN — Medication 4.5 MG: at 20:48

## 2022-11-21 RX ADMIN — GABAPENTIN 100 MG: 100 CAPSULE ORAL at 20:49

## 2022-11-21 RX ADMIN — Medication 100 MG: at 18:00

## 2022-11-21 RX ADMIN — Medication 600 MG: at 17:59

## 2022-11-21 NOTE — H&P
[facility]  [phone]    Patient Name: Nelli Garza  YOB: 1931    Date of Provider Hospice Visit: 11/21/22    Level of Care:   [] General Inpatient (GIP)    [] Routine   [x] Respite    Current Location of Care:  [] [facility] [] [facility] [] [facility] [] [address] [x] Hospice House [facility]      Principle Hospice Diagnosis: Parkinson's       HOSPICE SUMMARY     Chart Reviewed for patient's medical history and hospice care plan. Hospice Physician Certification/Recertification Narrative per Breann Huggins / marjorie GOODMAN:     Patient sent to the [facility] hospice house for respite level care secondary caregiver exhaustion. Patient currently being served by [facility]. In reviewing their CTI, patient with Parkinson's and has a history of a deep brain stimulator. He had suffered a hip fracture earlier this summer and did attempt rehabilitation. Ultimately discharged home with ongoing hospice care. He also has a history of GERD, COPD, peripheral vascular disease. Patient without any symptoms at this time and is eating his dinner without difficulty. Patient being admitted for Respite care x 5 days for   [x]  Caregiver exhaustion and needing break  []  Caregiver unavailable       PLAN   Patient's home medications were reviewed and reconciled. Continue with current home medications and plan of care as outlined in chart. Plan of care dictated by [facility] hospice. No acute needs needed at this time     and SW to support family needs. Disposition: Home with hospice once respite stay is completed.

## 2022-11-21 NOTE — PROGRESS NOTES
Problem: Falls - Risk of  Goal: *Absence of Falls  Description: Document Eduardo Boyce Fall Risk and appropriate interventions in the flowsheet. Outcome: Progressing Towards Goal  Note: Fall Risk Interventions:  Mobility Interventions: Bed/chair exit alarm              Elimination Interventions: Bed/chair exit alarm, Call light in reach              Problem: Patient Education: Go to Patient Education Activity  Goal: Patient/Family Education  Outcome: Progressing Towards Goal     Problem: Hospice Orientation  Goal: Demonstrate understanding of hospice philosophy, plan of care, and home hospice program  Description: The patient/family/caregiver will demonstrate understanding of hospice philosophy, plan of care and the home hospice program as evidenced by participation in meeting the patient's psychosocial, spiritual, medical, and physical needs inclusive of medical supplies/equipment focusing on symptoms. Outcome: Progressing Towards Goal     Problem: Potential for Alteration in Skin Integrity  Goal: Monitor skin for areas of alteration in skin integrity  Description: Patient/family/caregiver will demonstrate ability to care for patient's skin, monitor for areas of breakdown, and demonstrate methods to prevent breakdown during hospice care. Outcome: Progressing Towards Goal     Problem: Alteration in Mobility  Goal: Remain as independent as possible and remain safe in environment  Description: Patient will remain as independent as possible and remain safe in their environment.   Outcome: Progressing Towards Goal     Problem: Pain  Goal: Assess satisfaction of level of comfort and symptom control  Outcome: Progressing Towards Goal  Goal: *Control of acute pain  Outcome: Progressing Towards Goal     Problem: Anxiety/Agitation  Goal: Verbalize or staff assess the ability to manage anxiety  Description: The patient/family/caregiver will verbalize and demonstrate ability to manage the patient's anxiety throughout hospice care.  Outcome: Progressing Towards Goal     Problem: Pressure Injury - Risk of  Goal: *Prevention of pressure injury  Outcome: Progressing Towards Goal  Note: Pressure Injury Interventions:

## 2022-11-22 PROCEDURE — 0655 HSPC INPATIENT RESPITE

## 2022-11-22 PROCEDURE — 74011250637 HC RX REV CODE- 250/637: Performed by: FAMILY MEDICINE

## 2022-11-22 PROCEDURE — 74011000250 HC RX REV CODE- 250: Performed by: FAMILY MEDICINE

## 2022-11-22 PROCEDURE — G0299 HHS/HOSPICE OF RN EA 15 MIN: HCPCS

## 2022-11-22 RX ADMIN — MIDODRINE HYDROCHLORIDE 2.5 MG: 5 TABLET ORAL at 10:51

## 2022-11-22 RX ADMIN — Medication 600 MG: at 17:47

## 2022-11-22 RX ADMIN — CETIRIZINE HYDROCHLORIDE 10 MG: 10 TABLET, FILM COATED ORAL at 10:50

## 2022-11-22 RX ADMIN — Medication 1200 MG: at 10:50

## 2022-11-22 RX ADMIN — Medication 100 MG: at 17:47

## 2022-11-22 RX ADMIN — ACETAMINOPHEN 650 MG: 325 TABLET ORAL at 03:34

## 2022-11-22 RX ADMIN — SERTRALINE 100 MG: 50 TABLET, FILM COATED ORAL at 10:51

## 2022-11-22 RX ADMIN — Medication 5 MG: at 05:16

## 2022-11-22 RX ADMIN — MIDODRINE HYDROCHLORIDE 2.5 MG: 5 TABLET ORAL at 12:33

## 2022-11-22 RX ADMIN — Medication 5 MG: at 12:40

## 2022-11-22 RX ADMIN — MIDODRINE HYDROCHLORIDE 2.5 MG: 5 TABLET ORAL at 17:47

## 2022-11-22 RX ADMIN — QUETIAPINE FUMARATE 25 MG: 25 TABLET ORAL at 22:08

## 2022-11-22 RX ADMIN — GABAPENTIN 100 MG: 100 CAPSULE ORAL at 22:10

## 2022-11-22 RX ADMIN — FINASTERIDE 5 MG: 5 TABLET, FILM COATED ORAL at 10:51

## 2022-11-22 RX ADMIN — Medication 100 MG: at 10:50

## 2022-11-22 RX ADMIN — AMOXICILLIN AND CLAVULANATE POTASSIUM 1 TABLET: 875; 125 TABLET, FILM COATED ORAL at 22:09

## 2022-11-22 RX ADMIN — Medication 600 MG: at 10:49

## 2022-11-22 RX ADMIN — Medication 1 CAPSULE: at 10:50

## 2022-11-22 RX ADMIN — ACETAMINOPHEN 650 MG: 325 TABLET ORAL at 10:51

## 2022-11-22 RX ADMIN — QUETIAPINE FUMARATE 12.5 MG: 25 TABLET ORAL at 10:50

## 2022-11-22 RX ADMIN — AMOXICILLIN AND CLAVULANATE POTASSIUM 1 TABLET: 875; 125 TABLET, FILM COATED ORAL at 10:50

## 2022-11-22 RX ADMIN — Medication 1 TABLET: at 10:51

## 2022-11-22 RX ADMIN — PANTOPRAZOLE SODIUM 40 MG: 40 TABLET, DELAYED RELEASE ORAL at 12:33

## 2022-11-22 RX ADMIN — Medication 4.5 MG: at 22:09

## 2022-11-22 NOTE — PROGRESS NOTES
Problem: Alteration in Mobility  Goal: Remain as independent as possible and remain safe in environment  Description: Patient will remain as independent as possible and remain safe in their environment.   Outcome: Progressing Towards Goal

## 2022-11-22 NOTE — PROGRESS NOTES
1900 Report received from [place], [facility]. Pt is Respite level of care for caregiver breakdown. Dx Parkinson's Disease. 2000 Sitting in bedside recliner. Ready to go to bed. Incontinent of urine, pants are wet. States he was unable to use urinal sitting in chair. Pt has on pants with a belt and suspenders. Assisted to undress and use urinal. Skin care given. Depends and pajama bottoms put on. Pt stands with 1 person assist. Stance is unsteady. He should not stand alone. Assisted to bed. Hearing aids and dentures removed and placed in containers. HS medications taken. Very pleasant and talkative. Remains awake, reading. 2200 Awake, assisted by CNA to use urinal.  0000 Appears to be sleeping.  0200 Resting quietly, appears to be sleeping. 0330 Assisted to stand at bedside to void. Complains of foot pain. Request medication. Tylenol Arthritis 1 tab given po.  0500 Awake, assisted to stand to void. States foot still hurts a lot. 0515 Morphine 5 mg given SL.for left foot pain. 0600 Resting quietly with eyes closed, appears to be sleeping. No facial grimace. 0700 Report to oncoming nurse.     NAME OF PATIENT:  Jamaal Louis    LEVEL OF CARE:  Respite    REASON FOR GIP:   na    *PATIENT REMAINS ELIGIBLE FOR [facility] LEVEL OF CARE AS EVIDENCED BY: na      REASON FOR RESPITE:  Caregiver breakdown    O2 SAFETY:  na    FALL INTERVENTIONS PROVIDED:   Implemented/recommended use of non-skid footwear, Implemented/recommended use of fall risk identification flag to all team members, Implemented/recommended assistive devices and encouraged their use, Implemented/recommended resources for alarm system (personal alarm, bed alarm, call bell, etc.) , Implemented/recommended environmental changes (remove hazards, lower bed, improve lighting, etc.), and Implemented/recommended increased supervision/assistance    INTERDISPLINARY COMMUNICATION/COLLABORATION:  Physician, MSW, Mag[place], and RN, CNA    NEW MEDICATION INITIATION DOCUMENTATION:  na    Reason medication is being initiated:  na    MD / Provider name consulted re: change in status / initiation of new medication:  na    New Symptom(s):  na    New Order(s):  na    Name of the person notified of the changes:  na    Name of person being taught:  na    Instructions given:  na    Side Effects taught:  na    Response to teaching:  na      COMFORTABLE DYING MEASURE:  Is Patient/family satisfied with symptom level?  yes    DISCHARGE PLAN:  Return home when Respite stay is complete.

## 2022-11-22 NOTE — PROGRESS NOTES
Problem: Falls - Risk of  Goal: *Absence of Falls  Description: Document Eduardo Boyce Fall Risk and appropriate interventions in the flowsheet.   Outcome: Progressing Towards Goal  Note: Fall Risk Interventions:  Mobility Interventions: Bed/chair exit alarm         Medication Interventions: Bed/chair exit alarm, Patient to call before getting OOB    Elimination Interventions: Bed/chair exit alarm, Call light in reach              Problem: Pressure Injury - Risk of  Goal: *Prevention of pressure injury  Outcome: Progressing Towards Goal  Note: Pressure Injury Interventions:  Sensory Interventions: Minimize linen layers    Moisture Interventions: Absorbent underpads    Activity Interventions: Pressure redistribution bed/mattress(bed type)    Mobility Interventions: Pressure redistribution bed/mattress (bed type)    Nutrition Interventions: Document food/fluid/supplement intake    Friction and Shear Interventions: Apply protective barrier, creams and emollients

## 2022-11-22 NOTE — PROGRESS NOTES
0700 report received from Jenin Acevedo 20 patient sleeping in bed, respirations even and unlabored, neutral facial expression     0925 patient sleeping     1050 Nurys ACOSTA at bedside, morning medications given, breakfast heated up for patient; patient up to side of bed with assistance to use urinal     1240 patient complaining of pain in left foot, PRN morphine given     1420 patient sleeping    1600 patient sleeping in bed, no signs of pain noted     1745 patient used urinal, dinner tray set up     1900 report given to oncoming RN     NAME OF PATIENT:  Love 1006:  Respite    REASON FOR GIP:   N/A    *PATIENT REMAINS ELIGIBLE FOR GIP LEVEL OF CARE AS EVIDENCED BY: (MUST BE ADDRESSED OF PATIENT GIP)  N/A    REASON FOR RESPITE:  Caregiver breakdown    O2 SAFETY:  N/A    FALL INTERVENTIONS PROVIDED:   Implemented/recommended use of non-skid footwear and Implemented/recommended resources for alarm system (personal alarm, bed alarm, call bell, etc.)     INTERDISPLINARY COMMUNICATION/COLLABORATION:  Physician, ADRIA, Jarvis Kanner, and RN, CNA    NEW MEDICATION INITIATION DOCUMENTATION:  N/A    Reason medication is being initiated:  N/A    MD / Provider name consulted re: change in status / initiation of new medication:  N/A    New Symptom(s):  N/A    New Order(s):  N/A    Name of the person notified of the changes:  N/A    Name of person being taught:  N/A    Instructions given:  N/A    Side Effects taught:  N/A    Response to teaching:  N/A      COMFORTABLE DYING MEASURE:  Is Patient/family satisfied with symptom level?  yes    DISCHARGE PLAN:  home with hospice after respite stay

## 2022-11-23 PROCEDURE — 0655 HSPC INPATIENT RESPITE

## 2022-11-23 PROCEDURE — 74011250637 HC RX REV CODE- 250/637: Performed by: FAMILY MEDICINE

## 2022-11-23 PROCEDURE — 74011000250 HC RX REV CODE- 250: Performed by: FAMILY MEDICINE

## 2022-11-23 PROCEDURE — G0299 HHS/HOSPICE OF RN EA 15 MIN: HCPCS

## 2022-11-23 RX ADMIN — AMOXICILLIN AND CLAVULANATE POTASSIUM 1 TABLET: 875; 125 TABLET, FILM COATED ORAL at 10:01

## 2022-11-23 RX ADMIN — Medication 600 MG: at 09:59

## 2022-11-23 RX ADMIN — QUETIAPINE FUMARATE 25 MG: 25 TABLET ORAL at 21:40

## 2022-11-23 RX ADMIN — QUETIAPINE FUMARATE 12.5 MG: 25 TABLET ORAL at 10:03

## 2022-11-23 RX ADMIN — MIDODRINE HYDROCHLORIDE 2.5 MG: 5 TABLET ORAL at 18:04

## 2022-11-23 RX ADMIN — AMOXICILLIN AND CLAVULANATE POTASSIUM 1 TABLET: 875; 125 TABLET, FILM COATED ORAL at 21:40

## 2022-11-23 RX ADMIN — SERTRALINE 100 MG: 50 TABLET, FILM COATED ORAL at 10:01

## 2022-11-23 RX ADMIN — Medication 100 MG: at 10:02

## 2022-11-23 RX ADMIN — Medication 100 MG: at 18:04

## 2022-11-23 RX ADMIN — ACETAMINOPHEN 650 MG: 325 TABLET ORAL at 09:59

## 2022-11-23 RX ADMIN — Medication 4.5 MG: at 21:40

## 2022-11-23 RX ADMIN — Medication 1 CAPSULE: at 10:00

## 2022-11-23 RX ADMIN — Medication 1200 MG: at 09:57

## 2022-11-23 RX ADMIN — GABAPENTIN 100 MG: 100 CAPSULE ORAL at 21:41

## 2022-11-23 RX ADMIN — Medication 1 TABLET: at 09:59

## 2022-11-23 RX ADMIN — Medication 600 MG: at 18:04

## 2022-11-23 RX ADMIN — FINASTERIDE 5 MG: 5 TABLET, FILM COATED ORAL at 10:01

## 2022-11-23 RX ADMIN — CETIRIZINE HYDROCHLORIDE 10 MG: 10 TABLET, FILM COATED ORAL at 10:00

## 2022-11-23 RX ADMIN — MIDODRINE HYDROCHLORIDE 2.5 MG: 5 TABLET ORAL at 10:02

## 2022-11-23 NOTE — PROGRESS NOTES
0700  Report received from Marylou Leader, [address]  Pt resting quietly in bed with eyes closed, neutral facial expression and unlabored respirations. Appears to be sleeping comfortably. 0930  Awakened patient for breakfast and medication. Assisted pt to stand and use urinal, repositioned in bed with breakfast tray. Pt alert, oriented, calm and cooperative with care. 1000  Scheduled medications administered. 1200  Pt sleeping. Neutral facial expression, unlabored respirations noted. 1400  Pt continues to sleep. No indication of discomfort noted. Scheduled medications held. 1600  Assisted pt to stand with walker to use urinal.  Pt weak, unsteady. Repositioned pt back in bed.  1800  Scheduled medications administered. 1900  Report given to oncoming nurse.       NAME OF PATIENT:  Maritza Lyn    LEVEL OF CARE:  [facility]pite[facility]    REASON FOR GIP:   NA    *PATIENT REMAINS ELIGIBLE FOR GIP LEVEL OF CARE AS EVIDENCED BY: (MUST BE ADDRESSED OF PATIENT GIP)  NA    REASON FOR RESPITE:  Caregiver Exhaustion    O2 SAFETY:  NA/Room Air    FALL INTERVENTIONS PROVIDED:   Implemented/recommended use of non-skid footwear, Implemented/recommended use of fall risk identification flag to all team members, Implemented/recommended assistive devices and encouraged their use, Implemented/recommended resources for alarm system (personal alarm, bed alarm, call bell, etc.) , Implemented/recommended environmental changes (remove hazards, lower bed, improve lighting, etc.), and Implemented/recommended increased supervision/assistance    INTERDISPLINARY COMMUNICATION/COLLABORATION:  Physician, ADRIA, Loi Gr, and RN, LITZY    NEW MEDICATION INITIATION DOCUMENTATION:  NA    Reason medication is being initiated:  NA    MD / Provider name consulted re: change in status / initiation of new medication:  NA    New Symptom(s):  NA    New Order(s):  NA    Name of the person notified of the changes:  NA    Name of person being taught: NA    Instructions given:  NA    Side Effects taught:  NA    Response to teaching:  NA    COMFORTABLE DYING MEASURE:  Is Patient/family satisfied with symptom level?  yes    DISCHARGE PLAN:  Return home with [facility] following Respite stay.

## 2022-11-23 NOTE — PROGRESS NOTES
Problem: Falls - Risk of  Goal: *Absence of Falls  Description: Document Bethany Embs Fall Risk and appropriate interventions in the flowsheet. Outcome: Progressing Towards Goal  Note: Fall Risk Interventions:  Mobility Interventions: Bed/chair exit alarm, Patient to call before getting OOB         Medication Interventions: Bed/chair exit alarm, Patient to call before getting OOB    Elimination Interventions: Patient to call for help with toileting needs, Bed/chair exit alarm, Call light in reach    History of Falls Interventions: Bed/chair exit alarm, Door open when patient unattended         Problem: Alteration in Mobility  Goal: Remain as independent as possible and remain safe in environment  Description: Patient will remain as independent as possible and remain safe in their environment. Outcome: Progressing Towards Goal     Problem: Pain  Goal: Assess satisfaction of level of comfort and symptom control  Outcome: Progressing Towards Goal     Problem: Anxiety/Agitation  Goal: Verbalize or staff assess the ability to manage anxiety  Description: The patient/family/caregiver will verbalize and demonstrate ability to manage the patient's anxiety throughout hospice care.   Outcome: Progressing Towards Goal     Problem: Pressure Injury - Risk of  Goal: *Prevention of pressure injury  Outcome: Progressing Towards Goal  Note: Pressure Injury Interventions:  Sensory Interventions: Minimize linen layers, Assess changes in LOC    Moisture Interventions: Absorbent underpads    Activity Interventions: Pressure redistribution bed/mattress(bed type)    Mobility Interventions: Pressure redistribution bed/mattress (bed type)    Nutrition Interventions: Document food/fluid/supplement intake, Offer support with meals,snacks and hydration    Friction and Shear Interventions: Apply protective barrier, creams and emollients, Lift sheet

## 2022-11-23 NOTE — PROGRESS NOTES
1900 Report from [address] [place] [address] Patient assessment completed. Patient alert and oriented. Patient denies any pain at this time. Patient is sitting up in bed with relaxed facial expression. Bed alarm on.     2015 Patient asleep in bed with neutral facial expression. 2120 Patient asleep in bed with unlabored respirations. 2210 Patient given scheduled oral medication, pt tolerated well. Patient denies any needs at this time. Patient resting in bed with no facial grimacing. 2335 Patient asleep in bed with neutral facial expression. 0130 Patient asleep with relaxed facial expression. 4227 Patient asleep with unlabored respirations. 0425 Patient asleep with no facial grimacing. 9816 Patient awake and voided in urinal. Patient also had incontinent episode, brief changed. Patient also bathed. Patient denies any other needs at this time and is resting with relaxed facial expression. 7893 Patient asleep with neutral facial expression. 0700 Report to oncoming nurse.        NAME OF PATIENT:  Christofer Ghosh    LEVEL OF CARE:  Respite    REASON FOR GIP:   N/a    *PATIENT REMAINS ELIGIBLE FOR GIP LEVEL OF CARE AS EVIDENCED BY: n/a      REASON FOR RESPITE:  Caregiver breakdown    O2 SAFETY:  N/a    FALL INTERVENTIONS PROVIDED:   Implemented/recommended use of non-skid footwear, Implemented/recommended use of fall risk identification flag to all team members, Implemented/recommended assistive devices and encouraged their use, Implemented/recommended resources for alarm system (personal alarm, bed alarm, call bell, etc.) , Implemented/recommended environmental changes (remove hazards, lower bed, improve lighting, etc.), and Implemented/recommended increased supervision/assistance    INTERDISPLINARY COMMUNICATION/COLLABORATION:  Physician, ADRIA, Dwayne Sahu, and RN, CNA    NEW MEDICATION INITIATION DOCUMENTATION:  N/a    Reason medication is being initiated:  n/a    MD / Provider name consulted re: change in status / initiation of new medication:  n/a    New Symptom(s):  n/a    New Order(s):  n/a    Name of the person notified of the changes:  n/a    Name of person being taught:  n/a    Instructions given:  n/a    Side Effects taught:  n/a    Response to teaching:  n/a      COMFORTABLE DYING MEASURE:  Is Patient/family satisfied with symptom level?  yes    DISCHARGE PLAN:  home at end of respite stay

## 2022-11-23 NOTE — PROGRESS NOTES
Problem: Falls - Risk of  Goal: *Absence of Falls  Description: Document Sofia Fothergill Fall Risk and appropriate interventions in the flowsheet. Outcome: Progressing Towards Goal  Note: Fall Risk Interventions:  Mobility Interventions: Bed/chair exit alarm         Medication Interventions: Bed/chair exit alarm    Elimination Interventions: Bed/chair exit alarm              Problem: Patient Education: Go to Patient Education Activity  Goal: Patient/Family Education  Outcome: Progressing Towards Goal     Problem: Hospice Orientation  Goal: Demonstrate understanding of hospice philosophy, plan of care, and home hospice program  Description: The patient/family/caregiver will demonstrate understanding of hospice philosophy, plan of care and the home hospice program as evidenced by participation in meeting the patient's psychosocial, spiritual, medical, and physical needs inclusive of medical supplies/equipment focusing on symptoms. Outcome: Progressing Towards Goal     Problem: Potential for Alteration in Skin Integrity  Goal: Monitor skin for areas of alteration in skin integrity  Description: Patient/family/caregiver will demonstrate ability to care for patient's skin, monitor for areas of breakdown, and demonstrate methods to prevent breakdown during hospice care. Outcome: Progressing Towards Goal     Problem: Pain  Goal: Assess satisfaction of level of comfort and symptom control  Outcome: Progressing Towards Goal  Goal: *Control of acute pain  Outcome: Progressing Towards Goal     Problem: Alteration in Mobility  Goal: Remain as independent as possible and remain safe in environment  Description: Patient will remain as independent as possible and remain safe in their environment.   Outcome: Progressing Towards Goal     Problem: Anxiety/Agitation  Goal: Verbalize or staff assess the ability to manage anxiety  Description: The patient/family/caregiver will verbalize and demonstrate ability to manage the patient's anxiety throughout hospice care. Outcome: Progressing Towards Goal     Problem: Pressure Injury - Risk of  Goal: *Prevention of pressure injury  Outcome: Progressing Towards Goal  Note: Pressure Injury Interventions:  Sensory Interventions: Minimize linen layers    Moisture Interventions: Absorbent underpads    Activity Interventions: Pressure redistribution bed/mattress(bed type)    Mobility Interventions: Pressure redistribution bed/mattress (bed type)    Nutrition Interventions: Document food/fluid/supplement intake    Friction and Shear Interventions: Apply protective barrier, creams and emollients                Problem: Pressure Injury - Risk of  Goal: *Prevention of pressure injury  Description: Document Trung Scale and appropriate interventions in the flowsheet.   Outcome: Progressing Towards Goal  Note: Pressure Injury Interventions:  Sensory Interventions: Minimize linen layers    Moisture Interventions: Absorbent underpads    Activity Interventions: Pressure redistribution bed/mattress(bed type)    Mobility Interventions: Pressure redistribution bed/mattress (bed type)    Nutrition Interventions: Document food/fluid/supplement intake    Friction and Shear Interventions: Apply protective barrier, creams and emollients                Problem: Patient Education: Go to Patient Education Activity  Goal: Patient/Family Education  Outcome: Progressing Towards Goal

## 2022-11-24 PROCEDURE — 0655 HSPC INPATIENT RESPITE

## 2022-11-24 PROCEDURE — 74011000250 HC RX REV CODE- 250: Performed by: FAMILY MEDICINE

## 2022-11-24 PROCEDURE — G0299 HHS/HOSPICE OF RN EA 15 MIN: HCPCS

## 2022-11-24 PROCEDURE — 74011250637 HC RX REV CODE- 250/637: Performed by: FAMILY MEDICINE

## 2022-11-24 RX ADMIN — Medication 100 MG: at 17:29

## 2022-11-24 RX ADMIN — Medication 1 CAPSULE: at 11:39

## 2022-11-24 RX ADMIN — ACETAMINOPHEN 650 MG: 325 TABLET ORAL at 11:38

## 2022-11-24 RX ADMIN — SERTRALINE 100 MG: 50 TABLET, FILM COATED ORAL at 11:39

## 2022-11-24 RX ADMIN — FINASTERIDE 5 MG: 5 TABLET, FILM COATED ORAL at 11:28

## 2022-11-24 RX ADMIN — QUETIAPINE FUMARATE 25 MG: 25 TABLET ORAL at 21:25

## 2022-11-24 RX ADMIN — Medication 1200 MG: at 07:30

## 2022-11-24 RX ADMIN — AMOXICILLIN AND CLAVULANATE POTASSIUM 1 TABLET: 875; 125 TABLET, FILM COATED ORAL at 21:26

## 2022-11-24 RX ADMIN — Medication 5 MG: at 04:24

## 2022-11-24 RX ADMIN — PANTOPRAZOLE SODIUM 40 MG: 40 TABLET, DELAYED RELEASE ORAL at 13:05

## 2022-11-24 RX ADMIN — Medication 600 MG: at 11:39

## 2022-11-24 RX ADMIN — Medication 100 MG: at 11:41

## 2022-11-24 RX ADMIN — AMOXICILLIN AND CLAVULANATE POTASSIUM 1 TABLET: 875; 125 TABLET, FILM COATED ORAL at 11:37

## 2022-11-24 RX ADMIN — Medication 600 MG: at 17:29

## 2022-11-24 RX ADMIN — MIDODRINE HYDROCHLORIDE 2.5 MG: 5 TABLET ORAL at 17:29

## 2022-11-24 RX ADMIN — Medication 1 TABLET: at 11:30

## 2022-11-24 RX ADMIN — Medication 4.5 MG: at 21:25

## 2022-11-24 RX ADMIN — GABAPENTIN 100 MG: 100 CAPSULE ORAL at 21:25

## 2022-11-24 RX ADMIN — QUETIAPINE FUMARATE 12.5 MG: 25 TABLET ORAL at 11:40

## 2022-11-24 RX ADMIN — CETIRIZINE HYDROCHLORIDE 10 MG: 10 TABLET, FILM COATED ORAL at 11:40

## 2022-11-24 RX ADMIN — MIDODRINE HYDROCHLORIDE 2.5 MG: 5 TABLET ORAL at 11:42

## 2022-11-24 NOTE — PROGRESS NOTES
Problem: Falls - Risk of  Goal: *Absence of Falls  Description: Document Edilma Nap Fall Risk and appropriate interventions in the flowsheet. Outcome: Progressing Towards Goal  Note: Fall Risk Interventions:  Mobility Interventions: Bed/chair exit alarm         Medication Interventions: Bed/chair exit alarm    Elimination Interventions: Patient to call for help with toileting needs, Bed/chair exit alarm, Call light in reach    History of Falls Interventions: Bed/chair exit alarm         Problem: Patient Education: Go to Patient Education Activity  Goal: Patient/Family Education  Outcome: Progressing Towards Goal     Problem: Hospice Orientation  Goal: Demonstrate understanding of hospice philosophy, plan of care, and home hospice program  Description: The patient/family/caregiver will demonstrate understanding of hospice philosophy, plan of care and the home hospice program as evidenced by participation in meeting the patient's psychosocial, spiritual, medical, and physical needs inclusive of medical supplies/equipment focusing on symptoms. Outcome: Progressing Towards Goal     Problem: Potential for Alteration in Skin Integrity  Goal: Monitor skin for areas of alteration in skin integrity  Description: Patient/family/caregiver will demonstrate ability to care for patient's skin, monitor for areas of breakdown, and demonstrate methods to prevent breakdown during hospice care. Outcome: Progressing Towards Goal     Problem: Alteration in Mobility  Goal: Remain as independent as possible and remain safe in environment  Description: Patient will remain as independent as possible and remain safe in their environment.   Outcome: Progressing Towards Goal     Problem: Pain  Goal: Assess satisfaction of level of comfort and symptom control  Outcome: Progressing Towards Goal  Goal: *Control of acute pain  Outcome: Progressing Towards Goal     Problem: Anxiety/Agitation  Goal: Verbalize or staff assess the ability to manage anxiety  Description: The patient/family/caregiver will verbalize and demonstrate ability to manage the patient's anxiety throughout hospice care. Outcome: Progressing Towards Goal     Problem: Pressure Injury - Risk of  Goal: *Prevention of pressure injury  Outcome: Progressing Towards Goal  Note: Pressure Injury Interventions:  Sensory Interventions: Keep linens dry and wrinkle-free, Float heels, Minimize linen layers    Moisture Interventions: Absorbent underpads    Activity Interventions: Pressure redistribution bed/mattress(bed type)    Mobility Interventions: Pressure redistribution bed/mattress (bed type)    Nutrition Interventions: Document food/fluid/supplement intake, Offer support with meals,snacks and hydration    Friction and Shear Interventions: Apply protective barrier, creams and emollients, HOB 30 degrees or less, Minimize layers                Problem: Pressure Injury - Risk of  Goal: *Prevention of pressure injury  Description: Document Trung Scale and appropriate interventions in the flowsheet.   Outcome: Progressing Towards Goal  Note: Pressure Injury Interventions:  Sensory Interventions: Keep linens dry and wrinkle-free, Float heels, Minimize linen layers    Moisture Interventions: Absorbent underpads    Activity Interventions: Pressure redistribution bed/mattress(bed type)    Mobility Interventions: Pressure redistribution bed/mattress (bed type)    Nutrition Interventions: Document food/fluid/supplement intake, Offer support with meals,snacks and hydration    Friction and Shear Interventions: Apply protective barrier, creams and emollients, HOB 30 degrees or less, Minimize layers                Problem: Patient Education: Go to Patient Education Activity  Goal: Patient/Family Education  Outcome: Progressing Towards Goal

## 2022-11-24 NOTE — HOSPICE
0700 Received report from [facility]. 4541 Patient is sleeping with relaxed face. 0830 Patient is sleeping with relaxed face. 0930 Patient is snoring, appears comfortable. 1030 Patient sleeping with respirations of 14, and relaxed face. 1130 Patient woke up assisted patient in toileting, and gave scheduled morning medications see MAR.  1330 Patient is resting in bed watching TV.  1545 Patient is sleeping with relaxed face. 36 Assisted patient with toileting and repositioned in bed. 1730 Patient is sitting up in bed eating   dinner. Scheduled medications given see MAR.     NAME OF PATIENT:  Diana Sender    LEVEL OF CARE:  Respite    REASON FOR GIP:   NA    *PATIENT REMAINS ELIGIBLE FOR GIP LEVEL OF CARE AS EVIDENCED BY: (MUST BE ADDRESSED OF PATIENT GIP)  NA    REASON FOR RESPITE:  Caregiver breakdown    O2 SAFETY:  NA    FALL INTERVENTIONS PROVIDED:   Implemented/recommended use of non-skid footwear, Implemented/recommended use of fall risk identification flag to all team members, Implemented/recommended assistive devices and encouraged their use, Implemented/recommended resources for alarm system (personal alarm, bed alarm, call bell, etc.) , Implemented/recommended environmental changes (remove hazards, lower bed, improve lighting, etc.), and Implemented/recommended increased supervision/assistance    INTERDISPLINARY COMMUNICATION/COLLABORATION:  Physician and RN, CNA    NEW MEDICATION INITIATION DOCUMENTATION:  NA    Reason medication is being initiated:  NAVA    MD / Provider name consulted re: change in status / initiation of new medication:  NA    New Symptom(s):  NA    New Order(s):  NA    Name of the person notified of the changes:  NA    Name of person being taught:  NA    Instructions given:  NA    Side Effects taught:  NA    Response to teaching:  NA      COMFORTABLE DYING MEASURE:  Is Patient/family satisfied with symptom level?  yes    DISCHARGE PLAN:  Home after respite stay is complete.

## 2022-11-24 NOTE — PROGRESS NOTES
Problem: Falls - Risk of  Goal: *Absence of Falls  Description: Document Viv White Fall Risk and appropriate interventions in the flowsheet. Outcome: Progressing Towards Goal  Note: Fall Risk Interventions:  Mobility Interventions: Bed/chair exit alarm         Medication Interventions: Bed/chair exit alarm    Elimination Interventions: Patient to call for help with toileting needs    History of Falls Interventions: Bed/chair exit alarm         Problem: Patient Education: Go to Patient Education Activity  Goal: Patient/Family Education  Outcome: Progressing Towards Goal     Problem: Hospice Orientation  Goal: Demonstrate understanding of hospice philosophy, plan of care, and home hospice program  Description: The patient/family/caregiver will demonstrate understanding of hospice philosophy, plan of care and the home hospice program as evidenced by participation in meeting the patient's psychosocial, spiritual, medical, and physical needs inclusive of medical supplies/equipment focusing on symptoms. Outcome: Progressing Towards Goal     Problem: Potential for Alteration in Skin Integrity  Goal: Monitor skin for areas of alteration in skin integrity  Description: Patient/family/caregiver will demonstrate ability to care for patient's skin, monitor for areas of breakdown, and demonstrate methods to prevent breakdown during hospice care. Outcome: Progressing Towards Goal     Problem: Alteration in Mobility  Goal: Remain as independent as possible and remain safe in environment  Description: Patient will remain as independent as possible and remain safe in their environment. Outcome: Progressing Towards Goal     Problem: Pain  Goal: Assess satisfaction of level of comfort and symptom control  Outcome: Progressing Towards Goal  Goal: *Control of acute pain  Outcome: Progressing Towards Goal     Problem: Anxiety/Agitation  Goal: Verbalize or staff assess the ability to manage anxiety  Description:  The patient/family/caregiver will verbalize and demonstrate ability to manage the patient's anxiety throughout hospice care. Outcome: Progressing Towards Goal     Problem: Pressure Injury - Risk of  Goal: *Prevention of pressure injury  Outcome: Progressing Towards Goal  Note: Pressure Injury Interventions:  Sensory Interventions: Keep linens dry and wrinkle-free    Moisture Interventions: Absorbent underpads    Activity Interventions: Pressure redistribution bed/mattress(bed type)    Mobility Interventions: Pressure redistribution bed/mattress (bed type)    Nutrition Interventions: Document food/fluid/supplement intake    Friction and Shear Interventions: Apply protective barrier, creams and emollients                Problem: Pressure Injury - Risk of  Goal: *Prevention of pressure injury  Description: Document Trung Scale and appropriate interventions in the flowsheet.   Outcome: Progressing Towards Goal  Note: Pressure Injury Interventions:  Sensory Interventions: Keep linens dry and wrinkle-free    Moisture Interventions: Absorbent underpads    Activity Interventions: Pressure redistribution bed/mattress(bed type)    Mobility Interventions: Pressure redistribution bed/mattress (bed type)    Nutrition Interventions: Document food/fluid/supplement intake    Friction and Shear Interventions: Apply protective barrier, creams and emollients                Problem: Patient Education: Go to Patient Education Activity  Goal: Patient/Family Education  Outcome: Progressing Towards Goal

## 2022-11-24 NOTE — HOSPICE
1900 Report received from outgoing Nurse  1848 Patient assessed awake alert x4, assisted with urinal via walker. Patient has an unsteady gait requires assist x1. Pleasant affect no c/o of pain during assessment. 2110  rounded on patient, he was watching TV and drinking milk alert pleasant affect. 2140 Patient given scheduled medications with milk tolerated well. After medications patient was assisted with the urinal  voided 150 output and repositioned in bed with 2nd Nurse. Call bell and belongings in reach, bed alarm on.  2335 Patient is resting with eyes closed relaxed face, repositioned self to right sidelights off bed alarm on call bell within reach. 0101 Rounded on patient due to hearing a moan however patient was shifting and seemed to be just dreaming, eyes were closed and patient went back to resting with our any movement. 6276 Patient is resting with eyes closed repositioned self. Patient has call bell within reach. 5556 Patient was assisted by this Nurse to use the urinal and voided 150 cc out. Patient was a stand pivot with walker, unsteady gait. Patient went back to bed, heels floated and patient repositioned himself to a comfortable position. 0631 Patient given bath by JALEN Johnson . This Nurse gave the patient 5 mg of Morphine SL for pain in his left foot at a level 6 described as achy. Patient grimaced and guarded the foot during pain assessment. 5442 Patient used urinal and during toileting said that he hardly feels any pain in his foot and the medication worked. Patient went back to laying down with lights off to rest again.   5783 Patient is resting with eyes closed lights off.  0700 Report given      NAME OF PATIENT:  Juana Orourke    LEVEL OF CARE:  Respite    REASON FOR GIP:   NA    *PATIENT REMAINS ELIGIBLE FOR GIP LEVEL OF CARE AS EVIDENCED BY: (NA)      REASON FOR RESPITE:  Caregiver breakdown    O2 SAFETY:  NA    FALL INTERVENTIONS PROVIDED:   Implemented/recommended use of non-skid footwear, Implemented/recommended use of fall risk identification flag to all team members, Implemented/recommended assistive devices and encouraged their use, Implemented/recommended resources for alarm system (personal alarm, bed alarm, call bell, etc.) , Implemented/recommended environmental changes (remove hazards, lower bed, improve lighting, etc.), and Implemented/recommended increased supervision/assistance    INTERDISPLINARY COMMUNICATION/COLLABORATION:  Physician, ADRIA, Bhargavi Ness, and RN, CNA    NEW MEDICATION INITIATION DOCUMENTATION:  NA    Reason medication is being initiated:  NA    MD / Provider name consulted re: change in status / initiation of new medication:  NA    New Symptom(s):  NA    New Order(s):  NA    Name of the person notified of the changes:  NA    Name of person being taught:  NA    Instructions given:  NA    Side Effects taught:  NA    Response to teaching:  NA      COMFORTABLE DYING MEASURE:  Is Patient/family satisfied with symptom level?  yes    DISCHARGE PLAN:    Due to return home after respite stay

## 2022-11-25 VITALS
TEMPERATURE: 98.4 F | HEART RATE: 72 BPM | SYSTOLIC BLOOD PRESSURE: 162 MMHG | RESPIRATION RATE: 18 BRPM | OXYGEN SATURATION: 90 % | DIASTOLIC BLOOD PRESSURE: 85 MMHG

## 2022-11-25 PROCEDURE — 74011000250 HC RX REV CODE- 250: Performed by: FAMILY MEDICINE

## 2022-11-25 PROCEDURE — 74011250637 HC RX REV CODE- 250/637: Performed by: FAMILY MEDICINE

## 2022-11-25 PROCEDURE — G0299 HHS/HOSPICE OF RN EA 15 MIN: HCPCS

## 2022-11-25 PROCEDURE — 0655 HSPC INPATIENT RESPITE

## 2022-11-25 RX ADMIN — Medication 1 CAPSULE: at 09:30

## 2022-11-25 RX ADMIN — MIDODRINE HYDROCHLORIDE 2.5 MG: 5 TABLET ORAL at 09:28

## 2022-11-25 RX ADMIN — QUETIAPINE FUMARATE 25 MG: 25 TABLET ORAL at 21:07

## 2022-11-25 RX ADMIN — MIDODRINE HYDROCHLORIDE 2.5 MG: 5 TABLET ORAL at 18:16

## 2022-11-25 RX ADMIN — Medication 600 MG: at 18:16

## 2022-11-25 RX ADMIN — AMOXICILLIN AND CLAVULANATE POTASSIUM 1 TABLET: 875; 125 TABLET, FILM COATED ORAL at 09:29

## 2022-11-25 RX ADMIN — SENNOSIDES AND DOCUSATE SODIUM 2 TABLET: 50; 8.6 TABLET ORAL at 14:07

## 2022-11-25 RX ADMIN — PANTOPRAZOLE SODIUM 40 MG: 40 TABLET, DELAYED RELEASE ORAL at 13:24

## 2022-11-25 RX ADMIN — Medication 4.5 MG: at 21:07

## 2022-11-25 RX ADMIN — Medication 1200 MG: at 09:31

## 2022-11-25 RX ADMIN — Medication 1 TABLET: at 09:31

## 2022-11-25 RX ADMIN — CETIRIZINE HYDROCHLORIDE 10 MG: 10 TABLET, FILM COATED ORAL at 09:28

## 2022-11-25 RX ADMIN — FINASTERIDE 5 MG: 5 TABLET, FILM COATED ORAL at 09:30

## 2022-11-25 RX ADMIN — GABAPENTIN 100 MG: 100 CAPSULE ORAL at 21:07

## 2022-11-25 RX ADMIN — Medication 600 MG: at 09:30

## 2022-11-25 RX ADMIN — MIDODRINE HYDROCHLORIDE 2.5 MG: 5 TABLET ORAL at 13:24

## 2022-11-25 RX ADMIN — SENNOSIDES AND DOCUSATE SODIUM 2 TABLET: 50; 8.6 TABLET ORAL at 01:03

## 2022-11-25 RX ADMIN — QUETIAPINE FUMARATE 12.5 MG: 25 TABLET ORAL at 09:32

## 2022-11-25 RX ADMIN — Medication 100 MG: at 09:28

## 2022-11-25 RX ADMIN — ACETAMINOPHEN 650 MG: 325 TABLET ORAL at 09:31

## 2022-11-25 RX ADMIN — SERTRALINE 100 MG: 50 TABLET, FILM COATED ORAL at 09:29

## 2022-11-25 NOTE — HOSPICE
1900:  Report received from [place] [address]:  Pt assessed. Denies pain at present. 2125:  Scheduled meds given. Helped pt use urinal.  Voided 200 ml of dark yellow urine. 2300:  Continues to watch T.V.   0000:  Asked to get up to [facility]. Very unsteady. Able to pivot to commode. 0100: Passed 2 very small hard formed balls of stool. Helped back into bed. Pericolace 2 tabs given. 0256:  Lying in bed and resting quietly with eyes closed. 0345:  Set off bed alarm. Helped pt use urinal.  Voided 150 ml.  0555:  Lying in bed and resting quietly with eyes closed. Resp even and unlabored. Neutral facial expression. 2555:  Report given to oncoming nurse.     NAME OF PATIENT:  Britney Tate    LEVEL OF CARE:  Respite    REASON FOR GIP:   N/A    REASON FOR RESPITE:  Caregiver cannot care for patient due to:  exhaustion    O2 SAFETY:  N/A    FALL INTERVENTIONS PROVIDED:   Implemented/recommended use of non-skid footwear, Implemented/recommended use of fall risk identification flag to all team members, and Implemented/recommended resources for alarm system (personal alarm, bed alarm, call bell, etc.)     INTERDISPLINARY COMMUNICATION/COLLABORATION:  Physician, ADRIA, Migel Lilly, and RN, CNA    NEW MEDICATION INITIATION DOCUMENTATION:  N/A    Reason medication is being initiated:  N/A    MD / Provider name consulted re: change in status / initiation of new medication:  N/A    New Symptom(s):  N/A    New Order(s):  N/A    Name of the person notified of the changes:  N/A    Name of person being taught:  N/A    Instructions given:  N/A    Side Effects taught:  N/A    Response to teaching:  N/A      COMFORTABLE DYING MEASURE:  Is Patient/family satisfied with symptom level?  yes    DISCHARGE PLAN:  Home after Respite stay completed

## 2022-11-25 NOTE — PROGRESS NOTES
07[facility]  [facility]: pt resting quietly with eyes closed, respirations even and unlabored. Call bell within reach, bed alarm on.   0830: breakfast tray set up for patient. 0930: morning meds given per MAR. Last dose of doxycycline and augmentin given. Full assessment done, see flow sheets. 1100: pt resting quietly with eyes closed, respirations even and unlabored. 1200:lunch tray set up for patient. 1320: meds given per MAR. Pt boosted up in bed. States he \"should try to use the bathroom. \" Pt sat on edge of bed, voided in urinal 200ml. Pt stated he felt like he needed to have a BM. RN and CNA assisted him to [facility]. Pt is passing gas but unable to pass stool. Prune juice provided to patient. 1407: PRN senna given to patient with additional prune juice. CNA at bedside giving patient bed bath. 1525: pt resting in bed, watching TV. Bed alarm on. Call bell within reach. 1700: dinner tray set up for patient. 1816: evening meds given per [facility] by Elyssa Hernandez LPN.  8902: report given to oncoming nurse.     NAME OF PATIENT:  Bruno Membreno    LEVEL OF CARE:  respite    REASON FOR GIP:   N/a    *PATIENT REMAINS ELIGIBLE FOR GIP LEVEL OF CARE AS EVIDENCED BY: (MUST BE ADDRESSED OF PATIENT GIP)      REASON FOR RESPITE:  Caregiver cannot care for patient due to:  exhaustion    O2 SAFETY:  Pt is on room air    FALL INTERVENTIONS PROVIDED:   Implemented/recommended use of non-skid footwear, Implemented/recommended use of fall risk identification flag to all team members, Implemented/recommended assistive devices and encouraged their use, Implemented/recommended resources for alarm system (personal alarm, bed alarm, call bell, etc.) , Implemented/recommended environmental changes (remove hazards, lower bed, improve lighting, etc.), and Implemented/recommended increased supervision/assistance    INTERDISPLINARY COMMUNICATION/COLLABORATION:  Physician, MSW, [place], and RN, CNA    NEW MEDICATION INITIATION DOCUMENTATION:  Continue current POC    Reason medication is being initiated:  n/a    MD / Provider name consulted re: change in status / initiation of new medication:  n/a    New Symptom(s):  n/a    New Order(s):  n/a    Name of the person notified of the changes:  n/a    Name of person being taught:  n/a    Instructions given:  n/a    Side Effects taught:  n/a    Response to teaching:  n/a      COMFORTABLE DYING MEASURE:  Is Patient/family satisfied with symptom level?  yes    DISCHARGE PLAN:  return home at the completion of respite stay at [facility] to be followed by [facility].

## 2022-11-25 NOTE — PROGRESS NOTES
Problem: Falls - Risk of  Goal: *Absence of Falls  Description: Document Sofia Fothergill Fall Risk and appropriate interventions in the flowsheet. Outcome: Progressing Towards Goal  Note: Fall Risk Interventions:  Mobility Interventions: Bed/chair exit alarm         Medication Interventions: Bed/chair exit alarm    Elimination Interventions: Patient to call for help with toileting needs    History of Falls Interventions: Bed/chair exit alarm         Problem: Potential for Alteration in Skin Integrity  Goal: Monitor skin for areas of alteration in skin integrity  Description: Patient/family/caregiver will demonstrate ability to care for patient's skin, monitor for areas of breakdown, and demonstrate methods to prevent breakdown during hospice care. Outcome: Progressing Towards Goal     Problem: Alteration in Mobility  Goal: Remain as independent as possible and remain safe in environment  Description: Patient will remain as independent as possible and remain safe in their environment.   Outcome: Progressing Towards Goal     Problem: Pain  Goal: Assess satisfaction of level of comfort and symptom control  Outcome: Progressing Towards Goal  Goal: *Control of acute pain  Outcome: Progressing Towards Goal

## 2022-11-25 NOTE — PROGRESS NOTES
Problem: Falls - Risk of  Goal: *Absence of Falls  Description: Document Viv White Fall Risk and appropriate interventions in the flowsheet. Outcome: Progressing Towards Goal  Note: Fall Risk Interventions:  Mobility Interventions: Bed/chair exit alarm         Medication Interventions: Bed/chair exit alarm    Elimination Interventions: Bed/chair exit alarm, Call light in reach    History of Falls Interventions: Bed/chair exit alarm         Problem: Patient Education: Go to Patient Education Activity  Goal: Patient/Family Education  Outcome: Progressing Towards Goal     Problem: Hospice Orientation  Goal: Demonstrate understanding of hospice philosophy, plan of care, and home hospice program  Description: The patient/family/caregiver will demonstrate understanding of hospice philosophy, plan of care and the home hospice program as evidenced by participation in meeting the patient's psychosocial, spiritual, medical, and physical needs inclusive of medical supplies/equipment focusing on symptoms. Outcome: Progressing Towards Goal     Problem: Potential for Alteration in Skin Integrity  Goal: Monitor skin for areas of alteration in skin integrity  Description: Patient/family/caregiver will demonstrate ability to care for patient's skin, monitor for areas of breakdown, and demonstrate methods to prevent breakdown during hospice care. Outcome: Progressing Towards Goal     Problem: Alteration in Mobility  Goal: Remain as independent as possible and remain safe in environment  Description: Patient will remain as independent as possible and remain safe in their environment. Outcome: Progressing Towards Goal     Problem: Pain  Goal: Assess satisfaction of level of comfort and symptom control  Outcome: Progressing Towards Goal  Goal: *Control of acute pain  Outcome: Progressing Towards Goal     Problem: Anxiety/Agitation  Goal: Verbalize or staff assess the ability to manage anxiety  Description:  The patient/family/caregiver will verbalize and demonstrate ability to manage the patient's anxiety throughout hospice care. Outcome: Progressing Towards Goal     Problem: Pressure Injury - Risk of  Goal: *Prevention of pressure injury  Outcome: Progressing Towards Goal  Note: Pressure Injury Interventions:  Sensory Interventions: Check visual cues for pain, Float heels, Keep linens dry and wrinkle-free, Maintain/enhance activity level, Minimize linen layers, Pad between skin to skin, Pressure redistribution bed/mattress (bed type), Turn and reposition approx. every two hours (pillows and wedges if needed)    Moisture Interventions: Check for incontinence Q2 hours and as needed, Maintain skin hydration (lotion/cream), Minimize layers, Moisture barrier    Activity Interventions: Pressure redistribution bed/mattress(bed type)    Mobility Interventions: Float heels, HOB 30 degrees or less, Pressure redistribution bed/mattress (bed type), Turn and reposition approx. every two hours(pillow and wedges)    Nutrition Interventions: Offer support with meals,snacks and hydration, Document food/fluid/supplement intake    Friction and Shear Interventions: Foam dressings/transparent film/skin sealants, Minimize layers                Problem: Pressure Injury - Risk of  Goal: *Prevention of pressure injury  Description: Document Trung Scale and appropriate interventions in the flowsheet. Outcome: Progressing Towards Goal  Note: Pressure Injury Interventions:  Sensory Interventions: Check visual cues for pain, Float heels, Keep linens dry and wrinkle-free, Maintain/enhance activity level, Minimize linen layers, Pad between skin to skin, Pressure redistribution bed/mattress (bed type), Turn and reposition approx.  every two hours (pillows and wedges if needed)    Moisture Interventions: Check for incontinence Q2 hours and as needed, Maintain skin hydration (lotion/cream), Minimize layers, Moisture barrier    Activity Interventions: Pressure redistribution bed/mattress(bed type)    Mobility Interventions: Float heels, HOB 30 degrees or less, Pressure redistribution bed/mattress (bed type), Turn and reposition approx.  every two hours(pillow and wedges)    Nutrition Interventions: Offer support with meals,snacks and hydration, Document food/fluid/supplement intake    Friction and Shear Interventions: Foam dressings/transparent film/skin sealants, Minimize layers                Problem: Patient Education: Go to Patient Education Activity  Goal: Patient/Family Education  Outcome: Progressing Towards Goal

## 2022-11-26 PROCEDURE — G0299 HHS/HOSPICE OF RN EA 15 MIN: HCPCS

## 2022-11-26 PROCEDURE — 74011250637 HC RX REV CODE- 250/637: Performed by: FAMILY MEDICINE

## 2022-11-26 PROCEDURE — 74011000250 HC RX REV CODE- 250: Performed by: FAMILY MEDICINE

## 2022-11-26 PROCEDURE — 0651 HSPC ROUTINE HOME CARE

## 2022-11-26 RX ADMIN — SENNOSIDES AND DOCUSATE SODIUM 2 TABLET: 50; 8.6 TABLET ORAL at 09:21

## 2022-11-26 RX ADMIN — Medication 1 CAPSULE: at 09:07

## 2022-11-26 RX ADMIN — Medication 1200 MG: at 09:06

## 2022-11-26 RX ADMIN — ACETAMINOPHEN 650 MG: 325 TABLET ORAL at 09:07

## 2022-11-26 RX ADMIN — FINASTERIDE 5 MG: 5 TABLET, FILM COATED ORAL at 09:07

## 2022-11-26 RX ADMIN — Medication 600 MG: at 09:07

## 2022-11-26 RX ADMIN — MIDODRINE HYDROCHLORIDE 2.5 MG: 5 TABLET ORAL at 09:07

## 2022-11-26 RX ADMIN — CETIRIZINE HYDROCHLORIDE 10 MG: 10 TABLET, FILM COATED ORAL at 09:07

## 2022-11-26 RX ADMIN — QUETIAPINE FUMARATE 12.5 MG: 25 TABLET ORAL at 09:08

## 2022-11-26 RX ADMIN — SERTRALINE 100 MG: 50 TABLET, FILM COATED ORAL at 09:07

## 2022-11-26 RX ADMIN — Medication 1 TABLET: at 09:07

## 2022-11-26 NOTE — PROGRESS NOTES
Problem: Falls - Risk of  Goal: *Absence of Falls  Description: Document Ria Baxter Fall Risk and appropriate interventions in the flowsheet. Outcome: Progressing Towards Goal  Note: Fall Risk Interventions:  Mobility Interventions: Bed/chair exit alarm         Medication Interventions: Bed/chair exit alarm    Elimination Interventions: Bed/chair exit alarm, Call light in reach    History of Falls Interventions: Bed/chair exit alarm         Problem: Alteration in Mobility  Goal: Remain as independent as possible and remain safe in environment  Description: Patient will remain as independent as possible and remain safe in their environment. Outcome: Progressing Towards Goal     Problem: Pain  Goal: Assess satisfaction of level of comfort and symptom control  Outcome: Progressing Towards Goal  Goal: *Control of acute pain  Outcome: Progressing Towards Goal     Problem: Anxiety/Agitation  Goal: Verbalize or staff assess the ability to manage anxiety  Description: The patient/family/caregiver will verbalize and demonstrate ability to manage the patient's anxiety throughout hospice care. Outcome: Progressing Towards Goal     Problem: Pressure Injury - Risk of  Goal: *Prevention of pressure injury  Outcome: Progressing Towards Goal  Note: Pressure Injury Interventions:  Sensory Interventions: Check visual cues for pain, Float heels, Keep linens dry and wrinkle-free, Maintain/enhance activity level, Minimize linen layers, Pad between skin to skin, Pressure redistribution bed/mattress (bed type), Turn and reposition approx.  every two hours (pillows and wedges if needed)    Moisture Interventions: Check for incontinence Q2 hours and as needed, Maintain skin hydration (lotion/cream), Minimize layers, Moisture barrier    Activity Interventions: Pressure redistribution bed/mattress(bed type)    Mobility Interventions: Float heels, HOB 30 degrees or less, Pressure redistribution bed/mattress (bed type), Turn and reposition approx. every two hours(pillow and wedges)    Nutrition Interventions: Offer support with meals,snacks and hydration, Document food/fluid/supplement intake    Friction and Shear Interventions: Foam dressings/transparent film/skin sealants, Minimize layers                Problem: Pressure Injury - Risk of  Goal: *Prevention of pressure injury  Description: Document Trung Scale and appropriate interventions in the flowsheet. Outcome: Progressing Towards Goal  Note: Pressure Injury Interventions:  Sensory Interventions: Check visual cues for pain, Float heels, Keep linens dry and wrinkle-free, Maintain/enhance activity level, Minimize linen layers, Pad between skin to skin, Pressure redistribution bed/mattress (bed type), Turn and reposition approx. every two hours (pillows and wedges if needed)    Moisture Interventions: Check for incontinence Q2 hours and as needed, Maintain skin hydration (lotion/cream), Minimize layers, Moisture barrier    Activity Interventions: Pressure redistribution bed/mattress(bed type)    Mobility Interventions: Float heels, HOB 30 degrees or less, Pressure redistribution bed/mattress (bed type), Turn and reposition approx.  every two hours(pillow and wedges)    Nutrition Interventions: Offer support with meals,snacks and hydration, Document food/fluid/supplement intake    Friction and Shear Interventions: Foam dressings/transparent film/skin sealants, Minimize layers

## 2022-11-26 NOTE — PROGRESS NOTES
0072 resumed care of patient; report received from [place], RN     0900 assessment completed; patient on [facility], CNA dressed patient and packed up belongings     [phone] [id number] morning medications given; patient stated that he still feels like he needs to have a BM, patient straining on BSC, PRN senna s given    1015 voicemail left for Eleanor Martinez updating her on patient status and discharge    1020 voicemail left for [facility] hospice to call back     1030 patient disimpacted by PARUL, RN.  Large, hard stool removed      1036 patient alert and oriented x 3, no complaints of pain or shortness of breath. [facility] with [facility], DNR medications, walker, wheelchair, [address] [facility], [address] report given to JALEN Perea from [address]:  [employer] [address]:  respite    REASON FOR GIP:   N/A    *PATIENT REMAINS ELIGIBLE FOR [facility] LEVEL OF CARE AS EVIDENCED BY: (MUST BE ADDRESSED OF PATIENT GIP)  N/A    REASON FOR RESPITE:  Caregiver breakdown    O2 SAFETY:  N/A    FALL INTERVENTIONS PROVIDED:   Implemented/recommended resources for alarm system (personal alarm, bed alarm, call bell, etc.)     INTERDISPLINARY COMMUNICATION/COLLABORATION:  Physician, ADRIA, Stephen Alcocer, and RN, CNA    NEW MEDICATION INITIATION DOCUMENTATION:  N/A    Reason medication is being initiated:  N/A    MD / Provider name consulted re: change in status / initiation of new medication:  N/A    New Symptom(s):  N/A    New Order(s):  N/A    Name of the person notified of the changes:  N/A    Name of person being taught:  N/A    Instructions given:  N/A    Side Effects taught:  N/A    Response to teaching:  N/A      COMFORTABLE DYING MEASURE:  Is Patient/family satisfied with symptom level?  yes    DISCHARGE PLAN:  home with [facility] hospice

## 2022-11-26 NOTE — PROGRESS NOTES
Problem: Falls - Risk of  Goal: *Absence of Falls  Description: Document Theada Gamma Fall Risk and appropriate interventions in the flowsheet. Outcome: Progressing Towards Goal  Note: Fall Risk Interventions:  Mobility Interventions: Bed/chair exit alarm         Medication Interventions: Bed/chair exit alarm    Elimination Interventions: Bed/chair exit alarm, Call light in reach    History of Falls Interventions: Bed/chair exit alarm         Problem: Pressure Injury - Risk of  Goal: *Prevention of pressure injury  Outcome: Progressing Towards Goal  Note: Pressure Injury Interventions:  Sensory Interventions: Keep linens dry and wrinkle-free, Float heels, Minimize linen layers    Moisture Interventions: Apply protective barrier, creams and emollients, Absorbent underpads, Check for incontinence Q2 hours and as needed    Activity Interventions: Pressure redistribution bed/mattress(bed type)    Mobility Interventions: HOB 30 degrees or less, Pressure redistribution bed/mattress (bed type)    Nutrition Interventions: Offer support with meals,snacks and hydration    Friction and Shear Interventions: Apply protective barrier, creams and emollients, HOB 30 degrees or less, Minimize layers                Problem: Pressure Injury - Risk of  Goal: *Prevention of pressure injury  Description: Document Trung Scale and appropriate interventions in the flowsheet.   Outcome: Progressing Towards Goal  Note: Pressure Injury Interventions:  Sensory Interventions: Keep linens dry and wrinkle-free, Float heels, Minimize linen layers    Moisture Interventions: Apply protective barrier, creams and emollients, Absorbent underpads, Check for incontinence Q2 hours and as needed    Activity Interventions: Pressure redistribution bed/mattress(bed type)    Mobility Interventions: HOB 30 degrees or less, Pressure redistribution bed/mattress (bed type)    Nutrition Interventions: Offer support with meals,snacks and hydration    Friction and Shear Interventions: Apply protective barrier, creams and emollients, HOB 30 degrees or less, Minimize layers

## 2022-11-26 NOTE — HOSPICE
1900:  Report received from Lisandra Gaston:  Pt assessed. Denied pain, but winced when left foot assessed. Denies need for pain medication. Foot elevated on pillows. Asked for ice cream.  2107:  Scheduled meds given. Ate a cup of pudding. Voided 250 ml of yellow urine in urinal.    2300:  Lying in bed and resting quietly with eyes closed. Neutral facial expression. 0100:  Appears to be sleeping. Resp even and unlabored. 0300:  Lying in bed and resting quietly with eyes closed. Resp even and unlabored. Neutral facial expression. 0500:  Continues to rest with eyes closed. Resp shallow and even. Neutral facial expression.     NAME OF PATIENT:  Liban Chapman    LEVEL OF CARE:  Respite    REASON FOR GIP:   N/A      REASON FOR RESPITE:  Caregiver cannot care for patient due to:  exhaustion    O2 SAFETY:  N/A    FALL INTERVENTIONS PROVIDED:   Implemented/recommended use of non-skid footwear, Implemented/recommended assistive devices and encouraged their use, and Implemented/recommended resources for alarm system (personal alarm, bed alarm, call bell, etc.)     INTERDISPLINARY COMMUNICATION/COLLABORATION:  Physician, ADRIA, Renuka Coto, and RN, CNA    NEW MEDICATION INITIATION DOCUMENTATION:  N/A    Reason medication is being initiated:  N/A    MD / Provider name consulted re: change in status / initiation of new medication:  N/A    New Symptom(s):  N/A    New Order(s):  N/A    Name of the person notified of the changes:  N/A    Name of person being taught:  N/A    Instructions given:  N/A    Side Effects taught:  N/A    Response to teaching:  N/A      COMFORTABLE DYING MEASURE:  Is Patient/family satisfied with symptom level?  yes    DISCHARGE PLAN:  Home in am

## 2022-11-26 NOTE — PROGRESS NOTES
Problem: Falls - Risk of  Goal: *Absence of Falls  Description: Document Viv White Fall Risk and appropriate interventions in the flowsheet. 11/26/2022 1016 by Mello Titus RN  Outcome: Resolved/Not Met  Note: Fall Risk Interventions:  Mobility Interventions: Bed/chair exit alarm         Medication Interventions: Bed/chair exit alarm    Elimination Interventions: Bed/chair exit alarm, Call light in reach    History of Falls Interventions: Bed/chair exit alarm      11/26/2022 0934 by Mello Titus RN  Outcome: Progressing Towards Goal  Note: Fall Risk Interventions:  Mobility Interventions: Bed/chair exit alarm         Medication Interventions: Bed/chair exit alarm    Elimination Interventions: Bed/chair exit alarm, Call light in reach    History of Falls Interventions: Bed/chair exit alarm         Problem: Patient Education: Go to Patient Education Activity  Goal: Patient/Family Education  11/26/2022 1016 by Mello Titus RN  Outcome: Resolved/Not Met  11/26/2022 0934 by Mello Titus RN  Outcome: Progressing Towards Goal     Problem: Hospice Orientation  Goal: Demonstrate understanding of hospice philosophy, plan of care, and home hospice program  Description: The patient/family/caregiver will demonstrate understanding of hospice philosophy, plan of care and the home hospice program as evidenced by participation in meeting the patient's psychosocial, spiritual, medical, and physical needs inclusive of medical supplies/equipment focusing on symptoms.   11/26/2022 1016 by Mello Titus RN  Outcome: Resolved/Not Met  11/26/2022 0934 by Mello Titus RN  Outcome: Progressing Towards Goal     Problem: Potential for Alteration in Skin Integrity  Goal: Monitor skin for areas of alteration in skin integrity  Description: Patient/family/caregiver will demonstrate ability to care for patient's skin, monitor for areas of breakdown, and demonstrate methods to prevent breakdown during hospice care.  11/26/2022 1016 by Jace Handy RN  Outcome: Resolved/Not Met  11/26/2022 0934 by Jace Handy RN  Outcome: Progressing Towards Goal     Problem: Alteration in Mobility  Goal: Remain as independent as possible and remain safe in environment  Description: Patient will remain as independent as possible and remain safe in their environment. 11/26/2022 1016 by Jace Handy RN  Outcome: Resolved/Not Met  11/26/2022 0934 by Jace Handy RN  Outcome: Progressing Towards Goal     Problem: Pain  Goal: Assess satisfaction of level of comfort and symptom control  11/26/2022 1016 by Jace Handy RN  Outcome: Resolved/Not Met  11/26/2022 0934 by Jace Handy RN  Outcome: Progressing Towards Goal  Goal: *Control of acute pain  11/26/2022 1016 by Jace Handy RN  Outcome: Resolved/Not Met  11/26/2022 0934 by Jace Handy RN  Outcome: Progressing Towards Goal     Problem: Anxiety/Agitation  Goal: Verbalize or staff assess the ability to manage anxiety  Description: The patient/family/caregiver will verbalize and demonstrate ability to manage the patient's anxiety throughout hospice care.   11/26/2022 1016 by Jace Handy RN  Outcome: Resolved/Not Met  11/26/2022 0934 by Jace Handy RN  Outcome: Progressing Towards Goal     Problem: Pressure Injury - Risk of  Goal: *Prevention of pressure injury  11/26/2022 1016 by Jace Handy RN  Outcome: Resolved/Not Met  Note: Pressure Injury Interventions:  Sensory Interventions: Keep linens dry and wrinkle-free, Float heels, Minimize linen layers    Moisture Interventions: Apply protective barrier, creams and emollients, Absorbent underpads, Check for incontinence Q2 hours and as needed    Activity Interventions: Pressure redistribution bed/mattress(bed type)    Mobility Interventions: HOB 30 degrees or less, Pressure redistribution bed/mattress (bed type)    Nutrition Interventions: Offer support with meals,snacks and hydration    Friction and Shear Interventions: Apply protective barrier, creams and emollients, HOB 30 degrees or less, Minimize layers             11/26/2022 0934 by Morales Pritchett RN  Outcome: Progressing Towards Goal  Note: Pressure Injury Interventions:  Sensory Interventions: Keep linens dry and wrinkle-free, Float heels, Minimize linen layers    Moisture Interventions: Apply protective barrier, creams and emollients, Absorbent underpads, Check for incontinence Q2 hours and as needed    Activity Interventions: Pressure redistribution bed/mattress(bed type)    Mobility Interventions: HOB 30 degrees or less, Pressure redistribution bed/mattress (bed type)    Nutrition Interventions: Offer support with meals,snacks and hydration    Friction and Shear Interventions: Apply protective barrier, creams and emollients, HOB 30 degrees or less, Minimize layers                Problem: Pressure Injury - Risk of  Goal: *Prevention of pressure injury  Description: Document Trung Scale and appropriate interventions in the flowsheet.   11/26/2022 1016 by Morales Pritchett RN  Outcome: Resolved/Not Met  Note: Pressure Injury Interventions:  Sensory Interventions: Keep linens dry and wrinkle-free, Float heels, Minimize linen layers    Moisture Interventions: Apply protective barrier, creams and emollients, Absorbent underpads, Check for incontinence Q2 hours and as needed    Activity Interventions: Pressure redistribution bed/mattress(bed type)    Mobility Interventions: HOB 30 degrees or less, Pressure redistribution bed/mattress (bed type)    Nutrition Interventions: Offer support with meals,snacks and hydration    Friction and Shear Interventions: Apply protective barrier, creams and emollients, HOB 30 degrees or less, Minimize layers             11/26/2022 0934 by Morales Pritchett RN  Outcome: Progressing Towards Goal  Note: Pressure Injury Interventions:  Sensory Interventions: Keep linens dry and wrinkle-free, Float heels, Minimize linen layers    Moisture Interventions: Apply protective barrier, creams and emollients, Absorbent underpads, Check for incontinence Q2 hours and as needed    Activity Interventions: Pressure redistribution bed/mattress(bed type)    Mobility Interventions: HOB 30 degrees or less, Pressure redistribution bed/mattress (bed type)    Nutrition Interventions: Offer support with meals,snacks and hydration    Friction and Shear Interventions: Apply protective barrier, creams and emollients, HOB 30 degrees or less, Minimize layers                Problem: Patient Education: Go to Patient Education Activity  Goal: Patient/Family Education  11/26/2022 1016 by Sukumar Locke, RN  Outcome: Resolved/Not Met  11/26/2022 0934 by Sukumar Locke, RN  Outcome: Progressing Towards Goal

## 2022-11-26 NOTE — HOSPICE
0700-Report received from [address] [place] [address]   0730--pt asleep in bed   0830-pt asleep; report given to [facility]

## 2023-01-01 ENCOUNTER — HOSPITAL ENCOUNTER (INPATIENT)
Age: 88
LOS: 2 days | DRG: 951 | End: 2023-01-25
Attending: FAMILY MEDICINE | Admitting: FAMILY MEDICINE

## 2023-01-01 ENCOUNTER — HOSPICE ADMISSION (OUTPATIENT)
Dept: HOSPICE | Facility: HOSPICE | Age: 88
End: 2023-01-01
Payer: MEDICARE

## 2023-01-01 ENCOUNTER — HOSPITAL ENCOUNTER (INPATIENT)
Age: 88
LOS: 1 days | Discharge: HOSPICE | DRG: 951 | End: 2023-01-23
Attending: STUDENT IN AN ORGANIZED HEALTH CARE EDUCATION/TRAINING PROGRAM | Admitting: HOSPITALIST
Payer: OTHER MISCELLANEOUS

## 2023-01-01 VITALS
HEART RATE: 89 BPM | TEMPERATURE: 102 F | OXYGEN SATURATION: 56 % | WEIGHT: 130.07 LBS | HEIGHT: 70 IN | RESPIRATION RATE: 12 BRPM | DIASTOLIC BLOOD PRESSURE: 52 MMHG | SYSTOLIC BLOOD PRESSURE: 110 MMHG | BODY MASS INDEX: 18.62 KG/M2

## 2023-01-01 VITALS
WEIGHT: 130 LBS | TEMPERATURE: 98.5 F | HEIGHT: 70 IN | DIASTOLIC BLOOD PRESSURE: 46 MMHG | RESPIRATION RATE: 16 BRPM | HEART RATE: 68 BPM | OXYGEN SATURATION: 86 % | SYSTOLIC BLOOD PRESSURE: 90 MMHG | BODY MASS INDEX: 18.61 KG/M2

## 2023-01-01 DIAGNOSIS — Z51.5 ADMISSION FOR END OF LIFE CARE: Primary | ICD-10-CM

## 2023-01-01 DIAGNOSIS — Z51.5 HOSPICE CARE: ICD-10-CM

## 2023-01-01 DIAGNOSIS — R45.1 RESTLESSNESS: ICD-10-CM

## 2023-01-01 DIAGNOSIS — R52 NONVERBAL SIGNS OF PAIN: Primary | ICD-10-CM

## 2023-01-01 DIAGNOSIS — R06.02 SHORTNESS OF BREATH: ICD-10-CM

## 2023-01-01 PROCEDURE — 0656 HSPC GENERAL INPATIENT

## 2023-01-01 PROCEDURE — 99285 EMERGENCY DEPT VISIT HI MDM: CPT

## 2023-01-01 PROCEDURE — 74011250636 HC RX REV CODE- 250/636: Performed by: HOSPITALIST

## 2023-01-01 PROCEDURE — 74011250636 HC RX REV CODE- 250/636: Performed by: FAMILY MEDICINE

## 2023-01-01 PROCEDURE — 74011250636 HC RX REV CODE- 250/636: Performed by: STUDENT IN AN ORGANIZED HEALTH CARE EDUCATION/TRAINING PROGRAM

## 2023-01-01 PROCEDURE — 65270000029 HC RM PRIVATE

## 2023-01-01 PROCEDURE — 74011000250 HC RX REV CODE- 250: Performed by: FAMILY MEDICINE

## 2023-01-01 PROCEDURE — 99233 SBSQ HOSP IP/OBS HIGH 50: CPT | Performed by: FAMILY MEDICINE

## 2023-01-01 PROCEDURE — 96375 TX/PRO/DX INJ NEW DRUG ADDON: CPT

## 2023-01-01 PROCEDURE — 96376 TX/PRO/DX INJ SAME DRUG ADON: CPT

## 2023-01-01 PROCEDURE — 74011000250 HC RX REV CODE- 250: Performed by: STUDENT IN AN ORGANIZED HEALTH CARE EDUCATION/TRAINING PROGRAM

## 2023-01-01 PROCEDURE — 74011000250 HC RX REV CODE- 250: Performed by: HOSPITALIST

## 2023-01-01 PROCEDURE — 96374 THER/PROPH/DIAG INJ IV PUSH: CPT

## 2023-01-01 RX ORDER — HYDROMORPHONE HCL IN 0.9% NACL 15 MG/30ML
1 PATIENT CONTROLLED ANALGESIA VIAL INTRAVENOUS CONTINUOUS
Status: DISCONTINUED | OUTPATIENT
Start: 2023-01-01 | End: 2023-01-01 | Stop reason: HOSPADM

## 2023-01-01 RX ORDER — MORPHINE SULFATE 100 MG/5ML
10 SOLUTION ORAL
Status: DISCONTINUED | OUTPATIENT
Start: 2023-01-01 | End: 2023-01-01 | Stop reason: HOSPADM

## 2023-01-01 RX ORDER — KETOROLAC TROMETHAMINE 30 MG/ML
30 INJECTION, SOLUTION INTRAMUSCULAR; INTRAVENOUS
Status: DISCONTINUED | OUTPATIENT
Start: 2023-01-01 | End: 2023-01-01

## 2023-01-01 RX ORDER — GLYCOPYRROLATE 0.2 MG/ML
0.1 INJECTION INTRAMUSCULAR; INTRAVENOUS EVERY 4 HOURS
Status: DISCONTINUED | OUTPATIENT
Start: 2023-01-01 | End: 2023-01-01

## 2023-01-01 RX ORDER — QUETIAPINE FUMARATE 25 MG/1
12.5 TABLET, FILM COATED ORAL DAILY
COMMUNITY
Start: 2023-01-01

## 2023-01-01 RX ORDER — CHLORPHENIRAMIN/PSEUDOEPHED/DM 1-15-5MG/5
17 LIQUID (ML) ORAL DAILY
COMMUNITY
Start: 2022-01-01

## 2023-01-01 RX ORDER — HYDROMORPHONE HYDROCHLORIDE 1 MG/ML
0.5 INJECTION, SOLUTION INTRAMUSCULAR; INTRAVENOUS; SUBCUTANEOUS
Status: DISCONTINUED | OUTPATIENT
Start: 2023-01-01 | End: 2023-01-01

## 2023-01-01 RX ORDER — MORPHINE SULFATE 4 MG/ML
4 INJECTION INTRAVENOUS
Status: DISCONTINUED | OUTPATIENT
Start: 2023-01-01 | End: 2023-01-01

## 2023-01-01 RX ORDER — ASCORBIC ACID 1000 MG
15 TABLET ORAL DAILY
COMMUNITY
Start: 2023-01-01

## 2023-01-01 RX ORDER — LORAZEPAM 2 MG/ML
2 INJECTION INTRAMUSCULAR
Status: DISCONTINUED | OUTPATIENT
Start: 2023-01-01 | End: 2023-01-01 | Stop reason: HOSPADM

## 2023-01-01 RX ORDER — MORPHINE SULFATE 4 MG/ML
4 INJECTION INTRAVENOUS
Status: DISCONTINUED | OUTPATIENT
Start: 2023-01-01 | End: 2023-01-01 | Stop reason: HOSPADM

## 2023-01-01 RX ORDER — KETOROLAC TROMETHAMINE 30 MG/ML
30 INJECTION, SOLUTION INTRAMUSCULAR; INTRAVENOUS
Status: DISCONTINUED | OUTPATIENT
Start: 2023-01-01 | End: 2023-01-01 | Stop reason: HOSPADM

## 2023-01-01 RX ORDER — ACETAMINOPHEN 650 MG/1
650 SUPPOSITORY RECTAL
Status: DISCONTINUED | OUTPATIENT
Start: 2023-01-01 | End: 2023-01-01 | Stop reason: HOSPADM

## 2023-01-01 RX ORDER — KETOROLAC TROMETHAMINE 30 MG/ML
15 INJECTION, SOLUTION INTRAMUSCULAR; INTRAVENOUS
Status: COMPLETED | OUTPATIENT
Start: 2023-01-01 | End: 2023-01-01

## 2023-01-01 RX ORDER — HYDROMORPHONE HYDROCHLORIDE 1 MG/ML
1 INJECTION, SOLUTION INTRAMUSCULAR; INTRAVENOUS; SUBCUTANEOUS EVERY 4 HOURS
Status: DISCONTINUED | OUTPATIENT
Start: 2023-01-01 | End: 2023-01-01

## 2023-01-01 RX ORDER — LORAZEPAM 2 MG/ML
1 CONCENTRATE ORAL
Status: DISCONTINUED | OUTPATIENT
Start: 2023-01-01 | End: 2023-01-01 | Stop reason: HOSPADM

## 2023-01-01 RX ORDER — MORPHINE SULFATE 2 MG/ML
6 INJECTION, SOLUTION INTRAMUSCULAR; INTRAVENOUS
Status: DISCONTINUED | OUTPATIENT
Start: 2023-01-01 | End: 2023-01-01

## 2023-01-01 RX ORDER — HYDROMORPHONE HYDROCHLORIDE 2 MG/ML
2 INJECTION, SOLUTION INTRAMUSCULAR; INTRAVENOUS; SUBCUTANEOUS
Status: DISCONTINUED | OUTPATIENT
Start: 2023-01-01 | End: 2023-01-01 | Stop reason: HOSPADM

## 2023-01-01 RX ORDER — GLYCOPYRROLATE 0.2 MG/ML
0.1 INJECTION INTRAMUSCULAR; INTRAVENOUS
Status: DISCONTINUED | OUTPATIENT
Start: 2023-01-01 | End: 2023-01-01 | Stop reason: HOSPADM

## 2023-01-01 RX ORDER — HYDROMORPHONE HYDROCHLORIDE 2 MG/ML
2 INJECTION, SOLUTION INTRAMUSCULAR; INTRAVENOUS; SUBCUTANEOUS EVERY 4 HOURS
Status: DISCONTINUED | OUTPATIENT
Start: 2023-01-01 | End: 2023-01-01

## 2023-01-01 RX ORDER — LORAZEPAM 2 MG/ML
1 INJECTION INTRAMUSCULAR
Status: DISCONTINUED | OUTPATIENT
Start: 2023-01-01 | End: 2023-01-01 | Stop reason: HOSPADM

## 2023-01-01 RX ORDER — GLYCOPYRROLATE 0.2 MG/ML
0.1 INJECTION INTRAMUSCULAR; INTRAVENOUS ONCE
Status: COMPLETED | OUTPATIENT
Start: 2023-01-01 | End: 2023-01-01

## 2023-01-01 RX ORDER — MIDODRINE HYDROCHLORIDE 2.5 MG/1
2.5 TABLET ORAL 3 TIMES DAILY
COMMUNITY
Start: 2023-01-01

## 2023-01-01 RX ORDER — MORPHINE SULFATE 4 MG/ML
10 INJECTION INTRAVENOUS
Status: DISCONTINUED | OUTPATIENT
Start: 2023-01-01 | End: 2023-01-01

## 2023-01-01 RX ORDER — HYDROMORPHONE HYDROCHLORIDE 1 MG/ML
1 INJECTION, SOLUTION INTRAMUSCULAR; INTRAVENOUS; SUBCUTANEOUS
Status: COMPLETED | OUTPATIENT
Start: 2023-01-01 | End: 2023-01-01

## 2023-01-01 RX ORDER — HYDROMORPHONE HYDROCHLORIDE 1 MG/ML
1 INJECTION, SOLUTION INTRAMUSCULAR; INTRAVENOUS; SUBCUTANEOUS
Status: DISCONTINUED | OUTPATIENT
Start: 2023-01-01 | End: 2023-01-01

## 2023-01-01 RX ORDER — HYDROCODONE BITARTRATE AND ACETAMINOPHEN 5; 325 MG/1; MG/1
1 TABLET ORAL
COMMUNITY
Start: 2023-01-01

## 2023-01-01 RX ORDER — LORAZEPAM 2 MG/ML
2 INJECTION INTRAMUSCULAR EVERY 4 HOURS
Status: DISCONTINUED | OUTPATIENT
Start: 2023-01-01 | End: 2023-01-01 | Stop reason: HOSPADM

## 2023-01-01 RX ORDER — ACETAMINOPHEN 325 MG/1
650 TABLET ORAL
Status: DISCONTINUED | OUTPATIENT
Start: 2023-01-01 | End: 2023-01-01 | Stop reason: HOSPADM

## 2023-01-01 RX ORDER — FACIAL-BODY WIPES
10 EACH TOPICAL DAILY PRN
Status: DISCONTINUED | OUTPATIENT
Start: 2023-01-01 | End: 2023-01-01 | Stop reason: HOSPADM

## 2023-01-01 RX ORDER — GABAPENTIN 100 MG/1
100 CAPSULE ORAL
COMMUNITY
Start: 2023-01-01

## 2023-01-01 RX ORDER — GLYCOPYRROLATE 0.2 MG/ML
0.2 INJECTION INTRAMUSCULAR; INTRAVENOUS EVERY 4 HOURS
Status: DISCONTINUED | OUTPATIENT
Start: 2023-01-01 | End: 2023-01-01 | Stop reason: HOSPADM

## 2023-01-01 RX ADMIN — LORAZEPAM 2 MG: 2 INJECTION INTRAMUSCULAR at 11:32

## 2023-01-01 RX ADMIN — LORAZEPAM 2 MG: 2 INJECTION INTRAMUSCULAR at 05:32

## 2023-01-01 RX ADMIN — GLYCOPYRROLATE 0.2 MG: 0.2 INJECTION INTRAMUSCULAR; INTRAVENOUS at 17:34

## 2023-01-01 RX ADMIN — LORAZEPAM 2 MG: 2 INJECTION INTRAMUSCULAR at 17:55

## 2023-01-01 RX ADMIN — LORAZEPAM 2 MG: 2 INJECTION INTRAMUSCULAR at 09:35

## 2023-01-01 RX ADMIN — GLYCOPYRROLATE 0.1 MG: 0.2 INJECTION, SOLUTION INTRAMUSCULAR; INTRAVENOUS at 00:04

## 2023-01-01 RX ADMIN — KETOROLAC TROMETHAMINE 15 MG: 30 INJECTION, SOLUTION INTRAMUSCULAR; INTRAVENOUS at 19:33

## 2023-01-01 RX ADMIN — LORAZEPAM 2 MG: 2 INJECTION INTRAMUSCULAR at 15:29

## 2023-01-01 RX ADMIN — HYDROMORPHONE HYDROCHLORIDE 1 MG: 1 INJECTION, SOLUTION INTRAMUSCULAR; INTRAVENOUS; SUBCUTANEOUS at 14:42

## 2023-01-01 RX ADMIN — HYDROMORPHONE HYDROCHLORIDE 2 MG: 2 INJECTION, SOLUTION INTRAMUSCULAR; INTRAVENOUS; SUBCUTANEOUS at 17:34

## 2023-01-01 RX ADMIN — KETOROLAC TROMETHAMINE 30 MG: 30 INJECTION, SOLUTION INTRAMUSCULAR; INTRAVENOUS at 01:40

## 2023-01-01 RX ADMIN — GLYCOPYRROLATE 0.2 MG: 0.2 INJECTION INTRAMUSCULAR; INTRAVENOUS at 14:00

## 2023-01-01 RX ADMIN — HYDROMORPHONE HYDROCHLORIDE 2 MG: 2 INJECTION, SOLUTION INTRAMUSCULAR; INTRAVENOUS; SUBCUTANEOUS at 12:50

## 2023-01-01 RX ADMIN — HYDROMORPHONE HYDROCHLORIDE 1 MG: 1 INJECTION, SOLUTION INTRAMUSCULAR; INTRAVENOUS; SUBCUTANEOUS at 03:14

## 2023-01-01 RX ADMIN — LORAZEPAM 2 MG: 2 INJECTION INTRAMUSCULAR at 10:03

## 2023-01-01 RX ADMIN — KETOROLAC TROMETHAMINE 30 MG: 30 INJECTION, SOLUTION INTRAMUSCULAR; INTRAVENOUS at 14:42

## 2023-01-01 RX ADMIN — MORPHINE SULFATE 4 MG: 4 INJECTION, SOLUTION INTRAMUSCULAR; INTRAVENOUS at 00:15

## 2023-01-01 RX ADMIN — HYDROMORPHONE HYDROCHLORIDE 2 MG: 2 INJECTION, SOLUTION INTRAMUSCULAR; INTRAVENOUS; SUBCUTANEOUS at 16:33

## 2023-01-01 RX ADMIN — MORPHINE SULFATE 6 MG: 2 INJECTION, SOLUTION INTRAMUSCULAR; INTRAVENOUS at 22:15

## 2023-01-01 RX ADMIN — LORAZEPAM 2 MG: 2 INJECTION INTRAMUSCULAR at 14:14

## 2023-01-01 RX ADMIN — HYDROMORPHONE HYDROCHLORIDE 1 MG: 1 INJECTION, SOLUTION INTRAMUSCULAR; INTRAVENOUS; SUBCUTANEOUS at 19:32

## 2023-01-01 RX ADMIN — HYDROMORPHONE HYDROCHLORIDE 1 MG: 1 INJECTION, SOLUTION INTRAMUSCULAR; INTRAVENOUS; SUBCUTANEOUS at 17:13

## 2023-01-01 RX ADMIN — MORPHINE SULFATE 6 MG: 2 INJECTION, SOLUTION INTRAMUSCULAR; INTRAVENOUS at 21:04

## 2023-01-01 RX ADMIN — HYDROMORPHONE HYDROCHLORIDE 2 MG: 2 INJECTION, SOLUTION INTRAMUSCULAR; INTRAVENOUS; SUBCUTANEOUS at 17:58

## 2023-01-01 RX ADMIN — LORAZEPAM 1 MG: 2 INJECTION INTRAMUSCULAR at 23:15

## 2023-01-01 RX ADMIN — HYDROMORPHONE HYDROCHLORIDE 2 MG: 2 INJECTION, SOLUTION INTRAMUSCULAR; INTRAVENOUS; SUBCUTANEOUS at 15:00

## 2023-01-01 RX ADMIN — HYDROMORPHONE HYDROCHLORIDE 2 MG: 2 INJECTION, SOLUTION INTRAMUSCULAR; INTRAVENOUS; SUBCUTANEOUS at 10:41

## 2023-01-01 RX ADMIN — GLYCOPYRROLATE 0.2 MG: 0.2 INJECTION INTRAMUSCULAR; INTRAVENOUS at 14:18

## 2023-01-01 RX ADMIN — LORAZEPAM 2 MG: 2 INJECTION INTRAMUSCULAR at 14:18

## 2023-01-01 RX ADMIN — HYDROMORPHONE HYDROCHLORIDE 1 MG: 1 INJECTION, SOLUTION INTRAMUSCULAR; INTRAVENOUS; SUBCUTANEOUS at 01:40

## 2023-01-01 RX ADMIN — LORAZEPAM 2 MG: 2 INJECTION INTRAMUSCULAR at 01:40

## 2023-01-01 RX ADMIN — Medication 0.8 MG/HR: at 19:27

## 2023-01-01 RX ADMIN — HYDROMORPHONE HYDROCHLORIDE 2 MG: 2 INJECTION, SOLUTION INTRAMUSCULAR; INTRAVENOUS; SUBCUTANEOUS at 14:00

## 2023-01-01 RX ADMIN — MORPHINE SULFATE 4 MG: 4 INJECTION, SOLUTION INTRAMUSCULAR; INTRAVENOUS at 08:43

## 2023-01-01 RX ADMIN — HYDROMORPHONE HYDROCHLORIDE 1 MG: 1 INJECTION, SOLUTION INTRAMUSCULAR; INTRAVENOUS; SUBCUTANEOUS at 00:27

## 2023-01-01 RX ADMIN — GLYCOPYRROLATE 0.1 MG: 0.2 INJECTION, SOLUTION INTRAMUSCULAR; INTRAVENOUS at 11:04

## 2023-01-01 RX ADMIN — HYDROMORPHONE HYDROCHLORIDE 1 MG: 1 INJECTION, SOLUTION INTRAMUSCULAR; INTRAVENOUS; SUBCUTANEOUS at 05:32

## 2023-01-01 RX ADMIN — MORPHINE SULFATE 6 MG: 2 INJECTION, SOLUTION INTRAMUSCULAR; INTRAVENOUS at 23:15

## 2023-01-01 RX ADMIN — GLYCOPYRROLATE 0.2 MG: 0.2 INJECTION INTRAMUSCULAR; INTRAVENOUS at 21:23

## 2023-01-01 RX ADMIN — KETOROLAC TROMETHAMINE 30 MG: 30 INJECTION, SOLUTION INTRAMUSCULAR; INTRAVENOUS at 12:01

## 2023-01-01 RX ADMIN — GLYCOPYRROLATE 0.2 MG: 0.2 INJECTION INTRAMUSCULAR; INTRAVENOUS at 01:55

## 2023-01-01 RX ADMIN — GLYCOPYRROLATE 0.2 MG: 0.2 INJECTION INTRAMUSCULAR; INTRAVENOUS at 09:35

## 2023-01-01 RX ADMIN — LORAZEPAM 1 MG: 2 INJECTION INTRAMUSCULAR at 07:21

## 2023-01-01 RX ADMIN — HYDROMORPHONE HYDROCHLORIDE 1 MG: 1 INJECTION, SOLUTION INTRAMUSCULAR; INTRAVENOUS; SUBCUTANEOUS at 22:48

## 2023-01-01 RX ADMIN — LORAZEPAM 2 MG: 2 INJECTION INTRAMUSCULAR at 16:34

## 2023-01-01 RX ADMIN — GLYCOPYRROLATE 0.2 MG: 0.2 INJECTION INTRAMUSCULAR; INTRAVENOUS at 16:30

## 2023-01-01 RX ADMIN — LORAZEPAM 2 MG: 2 INJECTION INTRAMUSCULAR at 14:00

## 2023-01-01 RX ADMIN — HYDROMORPHONE HYDROCHLORIDE 1 MG: 1 INJECTION, SOLUTION INTRAMUSCULAR; INTRAVENOUS; SUBCUTANEOUS at 21:23

## 2023-01-01 RX ADMIN — LORAZEPAM 2 MG: 2 INJECTION INTRAMUSCULAR at 18:04

## 2023-01-01 RX ADMIN — MORPHINE SULFATE 4 MG: 4 INJECTION, SOLUTION INTRAMUSCULAR; INTRAVENOUS at 13:06

## 2023-01-01 RX ADMIN — GLYCOPYRROLATE 0.2 MG: 0.2 INJECTION INTRAMUSCULAR; INTRAVENOUS at 03:14

## 2023-01-01 RX ADMIN — MORPHINE SULFATE 4 MG: 4 INJECTION, SOLUTION INTRAMUSCULAR; INTRAVENOUS at 09:44

## 2023-01-01 RX ADMIN — GLYCOPYRROLATE 0.1 MG: 0.2 INJECTION INTRAMUSCULAR; INTRAVENOUS at 19:55

## 2023-01-01 RX ADMIN — LORAZEPAM 2 MG: 2 INJECTION INTRAMUSCULAR at 22:48

## 2023-01-01 RX ADMIN — MORPHINE SULFATE 6 MG: 2 INJECTION, SOLUTION INTRAMUSCULAR; INTRAVENOUS at 19:58

## 2023-01-01 RX ADMIN — Medication 0.8 MG/HR: at 14:07

## 2023-01-01 RX ADMIN — GLYCOPYRROLATE 0.1 MG: 0.2 INJECTION INTRAMUSCULAR; INTRAVENOUS at 14:14

## 2023-01-01 RX ADMIN — GLYCOPYRROLATE 0.2 MG: 0.2 INJECTION INTRAMUSCULAR; INTRAVENOUS at 10:02

## 2023-01-01 RX ADMIN — HYDROMORPHONE HYDROCHLORIDE 2 MG: 2 INJECTION, SOLUTION INTRAMUSCULAR; INTRAVENOUS; SUBCUTANEOUS at 11:32

## 2023-01-01 RX ADMIN — HYDROMORPHONE HYDROCHLORIDE 1 MG: 1 INJECTION, SOLUTION INTRAMUSCULAR; INTRAVENOUS; SUBCUTANEOUS at 14:13

## 2023-01-01 RX ADMIN — MORPHINE SULFATE 4 MG: 4 INJECTION, SOLUTION INTRAMUSCULAR; INTRAVENOUS at 01:36

## 2023-01-01 RX ADMIN — GLYCOPYRROLATE 0.2 MG: 0.2 INJECTION INTRAMUSCULAR; INTRAVENOUS at 05:43

## 2023-01-01 RX ADMIN — HYDROMORPHONE HYDROCHLORIDE 2 MG: 2 INJECTION, SOLUTION INTRAMUSCULAR; INTRAVENOUS; SUBCUTANEOUS at 15:29

## 2023-01-01 RX ADMIN — LORAZEPAM 1 MG: 2 INJECTION INTRAMUSCULAR at 09:44

## 2023-01-01 RX ADMIN — MORPHINE SULFATE 4 MG: 4 INJECTION, SOLUTION INTRAMUSCULAR; INTRAVENOUS at 12:12

## 2023-01-01 RX ADMIN — LORAZEPAM 2 MG: 2 INJECTION INTRAMUSCULAR at 21:23

## 2023-01-01 RX ADMIN — HYDROMORPHONE HYDROCHLORIDE 1 MG: 1 INJECTION, SOLUTION INTRAMUSCULAR; INTRAVENOUS; SUBCUTANEOUS at 19:14

## 2023-01-01 RX ADMIN — GLYCOPYRROLATE 0.2 MG: 0.2 INJECTION INTRAMUSCULAR; INTRAVENOUS at 05:32

## 2023-01-01 RX ADMIN — LORAZEPAM 1 MG: 2 INJECTION INTRAMUSCULAR at 11:49

## 2023-01-01 RX ADMIN — LORAZEPAM 2 MG: 2 INJECTION INTRAMUSCULAR at 03:14

## 2023-01-01 RX ADMIN — LORAZEPAM 2 MG: 2 INJECTION INTRAMUSCULAR at 19:13

## 2023-01-01 RX ADMIN — HYDROMORPHONE HYDROCHLORIDE 2 MG: 2 INJECTION, SOLUTION INTRAMUSCULAR; INTRAVENOUS; SUBCUTANEOUS at 09:35

## 2023-01-01 RX ADMIN — MORPHINE SULFATE 6 MG: 2 INJECTION, SOLUTION INTRAMUSCULAR; INTRAVENOUS at 20:32

## 2023-01-01 RX ADMIN — KETOROLAC TROMETHAMINE 30 MG: 30 INJECTION, SOLUTION INTRAMUSCULAR; INTRAVENOUS at 07:54

## 2023-01-01 RX ADMIN — LORAZEPAM 2 MG: 2 INJECTION INTRAMUSCULAR at 10:42

## 2023-01-01 RX ADMIN — HYDROMORPHONE HYDROCHLORIDE 2 MG: 2 INJECTION, SOLUTION INTRAMUSCULAR; INTRAVENOUS; SUBCUTANEOUS at 10:42

## 2023-01-01 RX ADMIN — LORAZEPAM 2 MG: 2 INJECTION INTRAMUSCULAR at 01:56

## 2023-01-01 RX ADMIN — KETOROLAC TROMETHAMINE 30 MG: 30 INJECTION, SOLUTION INTRAMUSCULAR; INTRAVENOUS at 06:28

## 2023-01-01 RX ADMIN — LORAZEPAM 1 MG: 2 INJECTION INTRAMUSCULAR at 01:36

## 2023-01-01 RX ADMIN — MORPHINE SULFATE 6 MG: 2 INJECTION, SOLUTION INTRAMUSCULAR; INTRAVENOUS at 21:40

## 2023-01-01 RX ADMIN — HYDROMORPHONE HYDROCHLORIDE 1 MG: 1 INJECTION, SOLUTION INTRAMUSCULAR; INTRAVENOUS; SUBCUTANEOUS at 18:04

## 2023-01-01 RX ADMIN — MORPHINE SULFATE 4 MG: 4 INJECTION, SOLUTION INTRAMUSCULAR; INTRAVENOUS at 07:21

## 2023-01-01 RX ADMIN — Medication 0.8 MG/HR: at 07:34

## 2023-01-01 RX ADMIN — LORAZEPAM 2 MG: 2 INJECTION INTRAMUSCULAR at 00:27

## 2023-01-01 RX ADMIN — MORPHINE SULFATE 4 MG: 4 INJECTION, SOLUTION INTRAMUSCULAR; INTRAVENOUS at 02:57

## 2023-01-01 RX ADMIN — GLYCOPYRROLATE 0.2 MG: 0.2 INJECTION INTRAMUSCULAR; INTRAVENOUS at 22:09

## 2023-01-01 RX ADMIN — LORAZEPAM 2 MG: 2 INJECTION INTRAMUSCULAR at 05:43

## 2023-01-01 RX ADMIN — MORPHINE SULFATE 4 MG: 4 INJECTION, SOLUTION INTRAMUSCULAR; INTRAVENOUS at 11:05

## 2023-01-01 RX ADMIN — LORAZEPAM 2 MG: 2 INJECTION INTRAMUSCULAR at 17:13

## 2023-01-01 RX ADMIN — HYDROMORPHONE HYDROCHLORIDE 2 MG: 2 INJECTION, SOLUTION INTRAMUSCULAR; INTRAVENOUS; SUBCUTANEOUS at 16:57

## 2023-01-01 RX ADMIN — LORAZEPAM 2 MG: 2 INJECTION INTRAMUSCULAR at 22:09

## 2023-01-01 RX ADMIN — LORAZEPAM 2 MG: 2 INJECTION INTRAMUSCULAR at 14:42

## 2023-01-22 PROBLEM — R09.02 HYPOXIA: Status: ACTIVE | Noted: 2023-01-01

## 2023-01-22 PROBLEM — R41.82 ALTERED MENTAL STATUS: Status: ACTIVE | Noted: 2023-01-01

## 2023-01-23 PROBLEM — J96.00 ACUTE RESPIRATORY FAILURE (HCC): Status: ACTIVE | Noted: 2023-01-01

## 2023-01-23 NOTE — ED TRIAGE NOTES
Pt arrived via ems from home for complaint of pain management    Pt is on hospice, a DNR, and is on pain management. Pt on ems is at 70% on 15L Non rebreather     Pt has not been alert or oriented in past 12 hours    Per niece temp has been 102 for the past day.

## 2023-01-23 NOTE — PROGRESS NOTES
Problem: Hospice Orientation  Goal: Demonstrate understanding of hospice philosophy, plan of care, and home hospice program  Description: The patient/family/caregiver will demonstrate understanding of hospice philosophy, plan of care and the home hospice program as evidenced by participation in meeting the patient's psychosocial, spiritual, medical, and physical needs inclusive of medical supplies/equipment focusing on symptoms. Outcome: Progressing Towards Goal     Problem: Potential for Alteration in Skin Integrity  Goal: Monitor skin for areas of alteration in skin integrity  Description: Patient/family/caregiver will demonstrate ability to care for patient's skin, monitor for areas of breakdown, and demonstrate methods to prevent breakdown during hospice care. Outcome: Progressing Towards Goal     Problem: Risk for Falls  Goal: Free of falls during inpatient stay  Description: Patient will be free of falls during inpatient stay. Outcome: Progressing Towards Goal     Problem: Pain  Goal: *Control of acute pain  Outcome: Progressing Towards Goal     Problem: Breathing Pattern - Ineffective  Goal: *Use of effective breathing techniques  Outcome: Progressing Towards Goal     Problem: General Wound Care  Goal: *Non-infected wound: Improvement of existing wound, absence of infection, and maintenance of skin integrity  Outcome: Progressing Towards Goal  Goal: *Infected Wound: Prevention of further infection  Description: Infection control procedures (eg: clean dressings, clean gloves, hand washing, precautions to isolate wound from contamination, sterile instruments used for wound debridement) should be implemented.   Outcome: Progressing Towards Goal  Goal: Interventions  Outcome: Progressing Towards Goal     Problem: Imminent Death  Goal: Collaborate with patient/family/caregiver/interdisciplinary team to minimize and manage end of life symptoms  Outcome: Progressing Towards Goal

## 2023-01-23 NOTE — ED NOTES
Family at bedside. Expressed their wishes of making pt comfortable and keeping him pain free.  Family provided with tissues, warm wipes, call bell, and contacted pastoral care to come to bedside

## 2023-01-23 NOTE — HOSPICE
[facility]  [phone]    Social Work Admission Note  Patient Name: Denis Wick  YOB: 1931  Age: 80 y.o. Date of Visit: 01/23/23  Facility of Care: [address]  Patient Room: [facility]     Hospice Attending: Jose Juan Dave MD  Hospice Diagnosis: Acute respiratory failure ([address] [J96.00]    Level of Care:    [x]  GIP    []  Respite   []  Routine    Consents/NCD Documentation:     Consents Reviewed:   []  Yes  [x]  No, completed by other hospice staff member. Person Reviewed/Signed with:  []  Patient   []  Pts NOK/MPOA  Name:     Right to NCD Reviewed:   []  Yes  []  No, completed by other hospice staff member. NCD Requested:   []  Yes  []  No    Admission Nurse/Intake Notified NCD was requested:  []  Yes  []  No  []  Not requested    Planned Start of Care Date: 1/23/2023    Hospice Witness Representative:Nestor LUO MSW and Kaye Magdaleno met with patient at bed beside. Patient observed unresponsive and sleeping comfortably. Patient daughter and niece were at bedside. Both were teary when engaging in pts life review. Patient niece is a nurse who is available at bedside for emotional support. MSW and the  provided emotional support and supportive counselling through active listening and engagement. The family appreciated MSW, and Magaly's presence and the overall care received from the hospital staff. MSW and the Kelin Kidd encouraged the family to contact hospice if they have any questions or concerns, MSW will continue to remain available for patient and family needs.   ADVANCE CARE PLANNING    Advance Directive:  [x]  Yes  []  No   []  Would like to complete  Primary MPOA:  Secondary MPOA:   LNOK: Neto Wilson   Code Status: DNR  Durable DNR: x_ Yes  _ No  Advance Care Planning 7/12/2022   Confirm Advance Directive None   Patient Would Like to Complete Advance Directive Unable       Relationship Status:  []  Single     []       []      []  Domestic Partner     [x]  /  []  Common Law  []    []  Unknown    If in a relationship, name of partner/spouse:  Duration of relationship:    Alevism/Spirituality: Orthodox  []  Active In Alevism/Spirituality   []  Not Active   []  N/A  Notes:     Home: [address]   Resources Provided:  []  [employer]  []  Information on applying for disability  []  FMLA Paperwork  []  Letters Requested by [facility]   []  Sunday Dominguez  []  Final Arrangements Resources   []  Outside counseling services (individual or group therapy)  []  Grief resources   []  Doug Moreno  []  [employer]   []  [facility]   []  [facility] [employer]   []  Referral Sent to [employer]   []  Referral Sent to Music Therapy   []  Referral Sent to Pet Therapy  []  Other  Social Work Initial Assessment     Sex:  male    Pronoun Preference:   [x]  He/Him/His   []  She/Her/Hers   []  They/Them/Theirs   []   Patient Name   []   Decline to Answer  []   Unknown  []   Other   Notes:     Race/Ethnicity: (althea all that apply)  []  [address] or [place] Native  []    []  Black or [place] American  []   or   []  [address] [facility] or [facility]  [x]  White  []  Unknown  []  Other     Inpatient Financial Agreement Completed:   [x]  Yes  []  No    Insurance:   [x]  Medicare   []  Medicaid     []  [employer]    []  Self-Pay/Uninsured   Notes:      Has pt applied for FAP? []  Needs to Apply  []  Application Completed and Submitted   []  Approved/ Expiration Date:   [x]  N/A  Notes:     Has pt applied for Medicaid? []  Needs to Apply  []  Application Completed and Submitted/Application Number:   [x]  N/A  Notes:     Has a long term care screening ([facility]) been requested? []  Requested   []  Not Requested, Needs follow up  []  Completed  [x]  N/A  Notes:     Does the pt have a long term care insurance policy?   []  Yes  [x]  No  []  Yes, Needing assistance with paperwork  Notes:      Service:    []  Yes   [x]  No       []  Unknown    Appropriate for Pinning Ceremony:   []  Yes      [x]  No    Is patient using VA benefits? []  Yes      [x]  No  []  Needs assistance with accessing benefits  Notes:       Work History:   []  Full-Time/Part-Time  [x]  Retired   []  Other  Notes:     Primary Language: English   []   Needed  []   utilized during visit  []   Waived/ form completed    Ability to express thoughts/needs/feelings  []  Expressed thoughts/feelings/needs without difficulty  []  Requires extra time and cuing  []  Speech limited single words  []  Uses only gestures (eye, blinking eye or head movement/pointing)  []  Unable to express thoughts/feelings/needs (speech unintelligible or inappropriate)  [x]  Unresponsive  Notes:      Mental Status:  []  Alert-oriented to:     []  Person     []  Place     []  Time  []  Comatose-responds to:    []   Verbal stimuli    []  Tactile stimuli    []  Painful stimuli  []  Forgetful  []  Disoriented/Confused  []  Lethargic  []  Agitated  []  Unresponsive  []  Other (Unresponsive):    Notes:      Patients description of Illness/Current Health Status:    [x]  Patient unable to discuss  []  Patient unwilling to discuss  []  (Specify)        Knowledge/Understanding of Disease Process  Patient:    []  Demonstrates knowledge/understanding of disease process   []  Demonstrates knowledge/understanding of treatment plan   []  Demonstrates knowledge/understanding of prognosis   []  Demonstrates acceptance of prognosis   []  Demonstrates knowledge/understanding of resuscitation status   []  Other Unresponsive)  Caregiver:   [x]  Demonstrates knowledge/understanding of disease process   [x]  Demonstrates knowledge/understanding of treatment plan   [x]  Demonstrates knowledge/understanding of prognosis   [x]  Demonstrates acceptance of prognosis   [x]  Demonstrates knowledge/understanding of resuscitation status   []  Other (specify)  Notes:      Patients living arrangement/care setting:  Use the PRIOR COLUMN when the PATIENTS current health status necessitated a change in his/her primary residence. Prior Current Response              []             []    Patients own home/residence              []             []    Home of family member/friend              []             []    Boarding home/Group Home              []             []    Assisted living facility/FDC center              []             []    Hospital/Acute care facility              []             []    Skilled nursing facility              []             []    Long term care facility/Nursing home              []             [x]    Hospice in Patient      Primary Caregiver:  []  No Primary Caregiver  Name of Primary Caregiver: Aubree Fallon  Primary Caregiver Phone Number: [phone]  Relationship or Primary Caregiver:    []  Spouse/Significant other       [x]  Child        []  Step child       []  Sibling   []  Parent   []  Friend/Neighbor   []  Community/Restorationist Volunteer   []  Paid help   []  Other (specify):___________  Notes:       Family members/Significant others:  Name:  Relationship:  Phone Number:  Actively involved in care? []  Yes  []  No    Name:  Relationship:  Phone Number:  Actively involved in care?   []  Yes  []  No    Social support systems: (select ONE best description)  []  Excellent social support system which includes three or more family members or friends  [x]  Good social support system which includes two or less members or friends  []  [address] support which includes one family member or friend  []  Poor social support; no family members or friends; basically ALONE  Notes:      Emotional Status: (althea all that apply)    Patient Caregiver Response                 [x]                [x]    Mood/Affect stable and appropriate                   []                []    Angry                 [] []    Anxious                 []                []    Apprehensive                 []                []    Avoidant                 []                []    Clinging                 []                []    Depressed                 []                []    Distraught                 []                []    Fearful                 []                []    Flat Affect                 []                []    Helpless                 []                []    Hostile                 []                []    Impulsive                 []                []    Irritable                 []                []    Labile                 []                []    Manic                 []                []    Restlessness                 []                []    Sad                 []                []    Strain/Stress                 []                []    Suspicious                 []                [x]     Tearful                 []                 []     Withdrawn          Notes:     Coping Skills (strengths/weakness):    Patient: Coping Skills (strength/weakness):    Family/caregiver (strength/weakness)Patient families were present for support      [place] of care upon discharge (althea all that apply):     [x]  No burden evident   []  Family must administer medications   []  Illness causing financial strain   []  Family/Support feels overwhelmed   []  Family/Support sleep disturbed with patients care   []  Patients care causes extra physical stress  of death  []  Illness causes changes in family lifestyle  []  Illness impacting family/support employment  []  Family experiencing increased time demands  []  Patients behavior endangers family  []  Denial of patients illness  []  Concern over outcome of illness/fear  []  Patients behavior embarrassing to family   Notes:      Risk Factors: (althea all that apply):    [x]  No burden evident   []  Alcohol abuse  []  Financial resources inadequate to meet basic needs (food/house/etc)  []  Financial resources inadequate to meet health care needs (supplies/equipment/medications)  []  Food/nutrition resources inadequate  []  Home environment unsafe/inadequate for home care  []  Homicidal risk  []  Lives alone or without concerned relatives  []  Multiple medications/complex schedule  []  Physical limitations increase likelihood of falls  []  Plan of care/treatments complicated  []  Substance use/abuse  []  Suicidal risk  []  Visual impairment threatens safety/ability to perform self-care  []  Other (specify):     Abuse/Neglect (actual/potential risks):  [x]  No signs of abuse/neglect  []  History of abuse/neglect                 []  Physical          []  Sexual  []  History of domestic violence  []  Lacks adequate physical care  []  Lacks emotional nurturing/support  []  Lacks appropriate stimulation/cognitive experiences  []  Left alone inappropriately  []  Lacks necessary supervision  []  Inadequate or delayed medical care  []  Unsafe environment (i.e guns/drug use/history of violence in the home/etc.)  []  Bruising or other physical signs of injury present  []  Refer to child/adult protective services  []  Other (specify):   Notes:      Current Sources of Stress (in Addition to Current Illness):   [x]  None reported  []  Bills/Debt    []  Career/Job change    []   (short term)    []   (long term)    []  Death of a child (recent)    []  Death of a parent (recent)   []  Death of a spouse (recent)   []  Employment status changed   []  Family discord    []  Financial loss/Inadequate inther (specify):come  []  Job loss  []  Legal issues unresolved  []  Lifestyle change  []  Marital discord  []  Marriage within the last year  []  Paperwork (insurance/legal/etc) overwhelming  []  Separation/Divorce  []  Other (specify):  Notes:       Interventions/Plan of Care   []  MSW will assess social and emotional factors related to coping with end of life issues  []  MSW will provide community resource planning/referral   []  MSW to assist with relocation to different care setting if/when symptoms stabilize  [x]  Pt/Pts family will receive emotional support. []  Pt/Pts family will share the details surrounding the pts disease progression  []  Pt/Pts Family will show expression of grief by participating in life review. []  Pt/Pts Family will be educated and able to verbalize understanding of mental, emotional, and physical symptoms of grief. []  Pt/Pts Family will be educated and able to identify skills and social support to help cope with grief. []  Pts family will receive support for grief with emphasis on developmentally appropriate language.   [] Other:   Notes:       Discharge Planning   [x]  Home with family member    []  Return back to nursing home/facility  []  Needs assistance with placement/paid caregivers  []  Short Stay under routine level of care at [facility]   []  Other  Notes:     MSW Assessment Completed by: LIZ Pleitez  01/23/23    Time In:2:17 pm       Time Out:2:30 pm

## 2023-01-23 NOTE — HOSPICE
[facility]  [phone]     Patient Name: Ophelia Garcia  YOB: 1931  Age: 80 y.o. [facility] RN Note:      [id number] Triage received request after hours to admit Mr. Leonard to [facility] level of care. Please provide order and consult if you would like patient to be assessed and transitioned to inpatient hospice. 10:45 Consult received, chart reviewed and patient assessed. Family at bedside but POA has left to shower and will return. Patient meets criteria for inpatient hospice and will transition to [facility] if family in agreement.     Jael Yoder RN  Clinical Nurse Liaison  [phone]

## 2023-01-23 NOTE — HOSPICE
[facility]  [phone]    Inpatient Nursing Admission   Patient Name: Merline Mullet  YOB: 1931  Age: 80 y.o. Date of Hospice Admission: 1/23/2023  Hospice Attending Elected by Patient: Ramses Gee MD  Primary Care Physician: Shira Waddell MD  Admitting RN: Chandrika Hooper RN  : FREDO     Level of Care (GIP/Routine/Respite): [facility]  Facility of Care: [facility]  Patient Room: 225/01     HOSPICE SUMMARY   ER Visits/ Hospitalizations in past year: 1  Hospice Diagnosis: Acute respiratory failure ([facility] [J96.00]  Onset Date of Hospice Diagnosis: 1/23/23  Summary of Disease Progression Leading to Hospice Diagnosis:   80 y.o. male  with history of dementia, parkinson's disease, Peripheral arterial disease is on hospice care, but his pain is not controlled and he is having respiratory distress. He is already getting pain medications, but as not controlled call the hospice company who recommended to bring to the hospital.  Patient family would like to continue the hospice care but want him to be more comfortable. He is saturating 70% on 15 L nonrebreather on arrival.     Was given pain medications in the emergency room with good control of pain when I seen him. He is not moaning and not much responsive. As per the family for the last 24 hours he is not much alert. At baseline he is bedridden status, with recent history of fracture surgery but unable to improve anything. He is with advanced dementia. Contacted the hospice company but they recommended to have [address] with hospice people to admit, but because it is after hours in the night we are keeping him overnight for comfort care so they can  in the morning. Started on pain medications and anxiety medications that he requires to keep him comfortable, admitted to medical floor.   Co-Morbidities:   Patient Active Problem List   Diagnosis Code    PAD (peripheral artery disease) ([facility] I73.9    Pure hypercholesterolemia E78.00    Enlarged prostate with urinary obstruction N40.1, N13.8    Malignant neoplasm of prostate ([facility] C61    Renal cyst N28.1    Chronic prostatitis N41.1    Benign essential hypertension I10    Dyspnea R06.00    Hyperlipidemia E78.5    Tremor R25.1    Rhabdomyolysis M62.82    Fracture, hip (HCC) S72.009A    Altered mental status R41.82    Hypoxia R09.02    Acute respiratory failure (HCC) J96.00     Diagnoses RELATED to the terminal prognosis: dyspnea, AMS, hypoxia, PAD  Other Diagnoses: malignant neoplasm of prostate    Rationale for a prognosis of life expectancy of 6 months or less if the disease follows its normal course (Disease Specific History):     Erin Montaño is a 80 y.o. who was admitted to [facility]. The patient's principle diagnosis of acute respiratory failure  has resulted in intractable pain, shortness of breath, restlessness, secretions and AMS. Functionally, the patient's Palliative Performance Scale has declined over a period of the past 24 hours and is estimated at 10. The patients family chose comfort measures with the support of Hospice. Objective information that support this patients limited prognosis includes:   No results found for this or any previous visit (from the past 12 hour(s)). Radiologic Studies:  Non-plain film images such as CT, Ultrasound and MRI are read by the radiologist. Plain radiographic images are visualized and preliminarily interpreted by the ED Provider with the below findings:  Patient meets for GIP LOC as evidenced by need for management of heavy symptom burden that requires IV medication that must be administered in an acute care setting.     Prognosis estimated based on 01/23/23 clinical assessment is:   [] Few to Many Hours  [x] Hours to Days   [] Few to Many Days   [] Days to Weeks   [] Few to Many Weeks   [] Weeks to Months   [] Few to Many Months    ASSESSMENT    Patient self-reports:  []  Yes    [x] No    SYMPTOMS: severe pain, dyspnea, secretions, restlessness    SIGNS/PHYSICAL FINDINGS: moaning, grimacing, RR 20's, no command following    KARNOFSKY: 10    Learning Assessment:  Patient  Is patient willing/able to learn? Unable  What is the highest level of education completed? Learning preference (written material, demonstration, visual)? Learning barriers (ESOL, [place], poor vision)? Caregiver  Is caregiver willing to learn care for patient? Unable  What is the highest level of education completed? Learning preference (written material, demonstration, visual)? Learning barriers (ESOL, [place], poor vision)? CLINICAL INFORMATION     Currently this patient has:  [x] Supplemental O2 [x] Peripheral IV  [] PICC    [] PORT   [] James Catheter [] NG Tube   [] PEG Tube [] Ostomy    [] AICD: Has ICD been deactivated? [] Yes [] No:______    PLAN     1. Admit GIP for management of pain, secretions, restlessness and shortness of breath  2. Hydromorphone  1 mg IV every 4 hours and every 15 min prn  3. Lorazepam 2 mg IV every 4 hours and every 15 min prn  4. Glycopyrrolate 0.1 mg IV every 4 hours  5. Other comfort meds as needed for fever, secretions, etc.  6. Insert james for distention  7. O2 @ 2 LPM for comfort  8. Wound care to left foot per protocol  9. SW and  to meet needs of [id number]. Educate family in EOL process and symptom management    Hospice Team Frequency Orders:  Skilled Nurse - Daily x 7 days /every other day x 7 days with 5 PRN visits for symptom control. MSW - 1 visit for initial assessment/evaluation for family support and need for volunteer services. Vaishnavi Lev - 1 visit for initial assessment/evaluation for spiritual support.      ADVANCE CARE PLANNING (Complete in ACP Flow Sheet)   Code Status: DNR  Durable DNR: [x]  Yes  []  No  Code Status Discussed/Confirmed: yes  Preference for Other Life Sustaining Treatment Discussed/Confirmed:yes  Hospitalization Preference:  Family would like patient to receive EOL care in hospital    Scientologist: [address]: JENNIFER Shane    DISCHARGE PLANNING     1. Discharge Plan: Discharge to LTC should condition  stabilize. Patient will most likely  in hospital.  2. Patient/Family teaching: Goals of care/EOL process and symptom management  3. Response to patient/family teaching: Niece verbalized understanding. SOCIAL/EMOTIONAL/SPIRITUAL NEEDS     Spiritual Issues Identified: None. Hospital and hospice chaplain to provide spiritual support to family at bedside. Psych/ Social/ Emotional Issues Identified: MSW at bedside and will continue to provide emotional counseling. Caregiver Support:  [x] Provided information on End of [address] contacted, discharge to hospice order received  Dr. Mindy Varghese contacted, agrees to serve as attending provider for hospice and provided verbal certification of terminal illness with life expectancy of 6 months or less. Orders for hospice admission, medications and plan of treatment received. Medication reconciliation completed. MEDS: See medication list below  DME: Per hospital  Supplies: Per hospital  IDT communication to include MD, SN, SW, CH and support team    ALLERGIES AND MEDICATIONS     Allergies:    Allergies   Allergen Reactions    Nut - Unspecified Not Reported This Time     Current Facility-Administered Medications   Medication Dose Route Frequency    LORazepam (ATIVAN) injection 2 mg  2 mg IntraVENous Q15MIN PRN    LORazepam (ATIVAN) injection 2 mg  2 mg IntraVENous Q4H    ketorolac (TORADOL) injection 30 mg  30 mg IntraVENous Q8H PRN    glycopyrrolate (ROBINUL) injection 0.1 mg  0.1 mg IntraVENous Q4H    bisacodyL (DULCOLAX) suppository 10 mg  10 mg Rectal DAILY PRN    HYDROmorphone (DILAUDID) injection 1 mg  1 mg IntraVENous Q4H    HYDROmorphone (DILAUDID) injection 1 mg  1 mg IntraVENous Q15MIN PRN

## 2023-01-23 NOTE — ED PROVIDER NOTES
[facility] EMERGENCY DEPT  EMERGENCY DEPARTMENT HISTORY AND PHYSICAL EXAM      Date: 1/22/2023  Patient Name: Evelyn Choi  MRN: [id number]  YOB: 1931  Date of evaluation: 1/22/2023  Provider: Adan Lee MD   Note Started: 8:40 PM 1/22/23    HISTORY OF PRESENT ILLNESS     Chief Complaint   Patient presents with    Other       History Provided By: Patient's Daughter    HPI: Evelyn Choi, 80 y.o. male presents from home for respiratory distress. Patient has a history of COPD dementia, hypertension, is currently on a home hospice plan of care has been for the last year. Family notes that patient has had a significant decline over the last week, over the last 12 hours patient has become unresponsive tachypneic, moaning occasionally. Home medications were not controlling symptoms, called hospice agency and was directed to call ambulance to bring patient to emergency department for symptom management. Family does not want to revoke hospice keep patient DNR/DNI, however would like aggressive symptom management. Patient unresponsive, moaning.     PAST MEDICAL HISTORY   Past Medical History:  Past Medical History:   Diagnosis Date    Arthritis     Asthma     Burning with urination     Cancer ([facility] [address]     Prostate Finished radiation 3/2012    COPD     Essential tremor     GERD (gastroesophageal reflux disease)     Hyperlipidemia     Hypertension     PAD (peripheral artery disease) ([facility])        Past Surgical History:  Past Surgical History:   Procedure Laterality Date    HC STNT SFA VIBN WLGO -H1  12/12/2005    LEFT Superficial Femoral Arter Stent    HX ENDOSCOPY      HX GI      Bleeding ulcers    HX HEART CATHETERIZATION      HX HEENT      L eye    HX HEENT      Cataract    HX ORTHOPAEDIC      R & L Carpal tunnel    HX OTHER SURGICAL      Barrets esophagus    HX UROLOGICAL  05/11/2011    Prostate Bx    HX UROLOGICAL      Brachytherapy    HX UROLOGICAL  05/11/2011    cysto, BRP       Family History:  Family History Problem Relation Age of Onset    Diabetes Mother     Diabetes Sister     Diabetes Brother        Social History:  Social History     Tobacco Use    Smoking status: Former    Smokeless tobacco: Never   Substance Use Topics    Alcohol use: Not Currently     Comment: twice a month    Drug use: No       Allergies: Allergies   Allergen Reactions    Nut - Unspecified Not Reported This Time       PCP: Pari Mast MD    Current Meds:   Previous Medications    ALBUTEROL (PROVENTIL VENTOLIN) 2.5 MG /3 ML (0.083 %) NEBULIZER SOLUTION    by Nebulization route once. ALFUZOSIN (UROXATRAL) 10 MG SR TABLET    Take  by mouth daily. CALCIUM CARBONATE-VIT D3-MIN (CALTRATE 600+D PLUS MINERALS) 600 MG (1,500 MG)-400 UNIT CHEW    Take  by mouth. CANDESARTAN (ATACAND) 8 MG TABLET    Take  by mouth daily. CETIRIZINE (ZYRTEC) 10 MG TABLET    Take  by mouth daily. ESOMEPRAZOLE (NEXIUM) 40 MG CAPSULE    Take  by mouth daily. EZETIMIBE (ZETIA) 10 MG TABLET    Take  by mouth. FLUTICASONE-SALMETEROL (ADVAIR DISKUS) 250-50 MCG/DOSE DISKUS INHALER    Take 1 Puff by inhalation every twelve (12) hours. GABAPENTIN (NEURONTIN) 300 MG CAPSULE    Take 100 mg by mouth nightly. HYDROCORTISONE (HYCORT) 1 % OINTMENT    Apply  to affected area two (2) times a day. use thin layer     MOMETASONE (NASONEX) 50 MCG/ACTUATION NASAL SPRAY    2 Sprays daily. MONTELUKAST (SINGULAIR) 10 MG TABLET    Take 10 mg by mouth daily. MV-MIN-FOLIC ACID-LUTEIN (CENTRUM SILVER) 500-250 MCG CHEW    Take  by mouth. OMEGA-3 FATTY ACIDS-VITAMIN E (FISH OIL) 1,000 MG CAP    Take 1 Cap by mouth. PSYLLIUM (METAMUCIL SMOOTH TEXTURE) PACKET    Take 1 Packet by mouth daily. SIMVASTATIN (ZOCOR) 40 MG TABLET    Take  by mouth nightly. TOPIRAMATE (TOPAMAX) 50 MG TABLET    Take  by mouth two (2) times a day.        REVIEW OF SYSTEMS   Review of Systems   Unable to perform ROS: Patient unresponsive   Positives and Pertinent negatives as per HPI. PHYSICAL EXAM     ED Triage Vitals [01/22/23 1903]   ED Encounter Vitals Group      BP (!) 149/64      Pulse (Heart Rate) (!) 113      Resp Rate 27      Temp       Temp src       O2 Sat (%) (!) 78 %      Weight 130 lb      Height 5' 10\"      Physical Exam  Vitals and nursing note reviewed. Constitutional:       General: He is in acute distress. Appearance: He is ill-appearing and toxic-appearing. HENT:      Head: Normocephalic and atraumatic. Mouth/Throat:      Mouth: Mucous membranes are moist.   Cardiovascular:      Rate and Rhythm: Normal rate and regular rhythm. Heart sounds: Normal heart sounds. Pulmonary:      Breath sounds: Rales present. Abdominal:      General: Abdomen is flat. Bowel sounds are normal. There is no distension. Palpations: Abdomen is soft. Musculoskeletal:         General: No swelling or tenderness. Normal range of motion. Cervical back: Neck supple. Skin:     General: Skin is dry. Coloration: Skin is pale. SCREENINGS               No data recorded        LAB, EKG AND DIAGNOSTIC RESULTS   Labs:  No results found for this or any previous visit (from the past 12 hour(s)). Radiologic Studies:  Non-plain film images such as CT, Ultrasound and MRI are read by the radiologist. Plain radiographic images are visualized and preliminarily interpreted by the ED Provider with the below findings:        Interpretation per the Radiologist below, if available at the time of this note:  No results found. PROCEDURES   Unless otherwise noted below, none.   Performed by: David Chandra MD   Procedures      CRITICAL CARE TIME       ED COURSE and DIFFERENTIAL DIAGNOSIS/MDM   Vitals:    Vitals:    01/22/23 2037 01/22/23 2040 01/22/23 2041 01/22/23 2049   BP:       Pulse: 90 88 88 90   Resp: 17 17 17 16   SpO2: (!) 83% (!) 83% (!) 84% (!) 85%   Weight:       Height:            Patient was given the following medications:  Medications morphine injection 6 mg (6 mg IntraVENous Given 1/22/23 2032)   ketorolac (TORADOL) injection 15 mg (15 mg IntraVENous Given 1/22/23 1933)   glycopyrrolate (ROBINUL) injection 0.1 mg (0.1 mg IntraVENous Given 1/22/23 1955)   HYDROmorphone (DILAUDID) injection 1 mg (1 mg IntraVENous Given 1/22/23 1932)             Records Reviewed (source and summary of external notes): Nursing Notes    CC/HPI Summary, DDx, ED Course, and Reassessment: 80-year-old male history of COPD, hypertension, dementia, on a home hospice plan of care with precipitous decline over the last several weeks, patient has been unresponsive, moaning over the last 12 hours family states that symptoms have acutely exacerbated this evening, called 911 to bring patient to hospital for aggressive symptom management. Physical exam shows uncomfortable male, moaning, bilateral rhonchi appreciated, saturations 83%, hypotensive. IV established, will aggressively control patient's symptoms with IV Dilaudid, morphine, glycopyrrolate, patient has a history of Parkinson's do not feel that haloperidol is of benefit at this time, if patient exhibits any agitation will administer benzodiazepine. ED Course as of 01/22/23 2103   Fanny Alva Jan 22, 2023 2101 Patient reassessed is much more comfortable, not tachypneic, no audible rales noted. I discussed treatment plan with family, feel very reluctant to take patient home at this point states that they cannot manage his symptom burden. Will admit patient for comfort care, end-of-life care. [PZ]      ED Course User Index  [PZ] David Valderrama MD       Disposition Considerations (Tests not done, Shared Decision Making, Pt Expectation of Test or Treatment.):      FINAL IMPRESSION     1.  Admission for end of life care          DISPOSITION/PLAN   Admitted    Admit Note: Pt is being admitted by hospitalist team. The results of their tests and reason(s) for their admission have been discussed with pt and/or available family. They convey agreement and understanding for the need to be admitted and for the admission diagnosis. PATIENT REFERRED TO:  Follow-up Information    None           DISCHARGE MEDICATIONS:  Current Discharge Medication List            DISCONTINUED MEDICATIONS:  Current Discharge Medication List          I am the Primary Clinician of Record: Debbie Wagner MD (electronically signed)    (Please note that parts of this dictation were completed with voice recognition software. Quite often unanticipated grammatical, syntax, homophones, and other interpretive errors are inadvertently transcribed by the computer software. Please disregards these errors.  Please excuse any errors that have escaped final proofreading.)

## 2023-01-23 NOTE — ED NOTES
Rounded on pt and family. Pt is no longer in acute distress, appears more relaxed, vital signs are changing. Informed family that this is normal at this time, tissues provided, lights dimmed, call bell in reach.

## 2023-01-23 NOTE — ED NOTES
Pt removed from cardiac and pulse ox monitoring at this time. Pt resting comfortably on er stretcher, surrounded by family. Family provided with parry and cool wash clothes for pt.

## 2023-01-23 NOTE — ED NOTES
Pt breathing effort has decreased, medications given per md order, family at bedside and updated at this time. Pt resting comfortably, music playing, lights dimmed.

## 2023-01-23 NOTE — ED NOTES
Spoke with Medicare Hospice nurse about pt care, pt will be revoked from medicare hospice once admitted, spoke with [facility] hospice, they do not admit pts during the after hours, but they will admit him first thing in the morning, as for tonight he would need to be admitted under our hospitalist for comfort care. MD Garcia aware.

## 2023-01-23 NOTE — H&P
[facility] Those in [facility]  [phone]    Patient Name: Diana Snell  YOB: 1931    Date of Provider Hospice Visit: 01/23/23    Level of Care:   [x] General Inpatient (GIP)    [] Routine   [] Respite    Current Location of Care:  [] [facility] [] [facility] [] [facility] [] [facility] [] [facility], patient referred from:  [] [facility] [] [facility] [] [facility] [] [facility] [] Home [] Other:     Date of Original Hospice Admission: 1/23/2023  Hospice Medical Director at time of admission: Dr. Eva Al Diagnosis: Acute respiratory failure  Diagnoses RELATED to the terminal prognosis: Pain, PCM  Other Diagnoses: Dementia, Parkinson's disease, COPD     HOSPICE SUMMARY     Diana Snell is a 80y.o. year old  male who was [facility]. Initially presented to ED with increasing SOB and uncontrolled pain. PMH significant for Parkinson's disease, dementia, PAD, and COPD. Upon arrival SpO2 was in the 70s on 15L via nonrebreather. Prior to hospitalization pt lived alone in private home with paid caregivers, and was on service with another hospice agency with primary hospice dx of Parkinson's disease. Family reports that over the last couple of days he became weaker with increased dyspnea, and increasing pain to L foot with significant increase in necrotic tissue. They have elected to revoke with other agency in order to be admitted for GIP level of care for aggressive symptom management. The patient's principle diagnosis has resulted in altered mental status, increased work of breathing, pain, agitation/restlessness, and increased oropharyngeal secretions. Functionally, the patient's Karnofsky and/or Palliative Performance Scale has declined over a period of days and is estimated at 10. The patient is dependent on the following ADLs: all    The patient/family chose comfort measures with the support of Hospice.      HOSPICE DIAGNOSES Active Symptoms:  1. Altered mental status  2. Agitation/restlessness  3. Increased work of breathing  4. Pain  5. Secretions  6. Hospice care     PLAN   Admit to [facility] level of care as PO/SL medications were ineffective at managing current symptom burden; requires frequent nursing assessment and administration and titration of parenteral medications for palliation of symptoms  Robinul 0.2 mg IV every 4 hours  Dilaudid 1 mg IV every 4 hours with prn dosing available every 15 minutes for signs of breakthrough pain or air hunger  Ativan 2 mg IV every 4 hours with prn dosing available every 15 minutes for breakthrough restlessness/agitation  Toradol 30 mg IV every 8 hours as needed for fever  All other symptom management medications as needed  Oxygen for comfort; may wean as tolerated  Place james catheter for signs of urinary retention; james care per facility protocol  Niece who is POA, and her  and daughter present at bedside during provider rounds; reviewed hospice philosophy and goals of care with emphasis on comfort and safety; reviewed symptom management medications and rationale and indications for use; reviewed assessment findings consistent with end of life; all verbalized understanding and agreeable to hospice plan of care; seemingly accepting of limited prognosis and state that they just want him to be comfortable   and SW to support family needs  Disposition: anticipate death in hospital  Hospice Plan of care was reviewed in detail and agree with current plan of care    Prognosis estimated based on 01/23/23 clinical assessment is:   [x] Hours to Days    [] Days to Weeks    [] Other:    Communicated plan of care with: Hospice Case Manager; Hospice IDT; Care Team     GOALS OF CARE     Patient/Medical POA stated Goal of Care: comfort care and symptom management    [x] I have reviewed and/or updated ACP information in the Advance Care Planning Navigator.  This information is available in the Adv Care Plan link in the patient's chart header. Primary Decision [facility] Agent):   Primary Decision Maker: Jacoby Castro - [phone]    Secondary Decision Maker: Juliette Reynaga - Mandeep - [phone]    Resuscitation Status: DNR  If DNR is there a Durable DNR on file? : [] Yes [x] No (If no, complete Durable DNR)    HISTORY     History obtained from: chart review; discussion with liaison; discussion with family    CHIEF COMPLAINT: unable to obtain  The patient is:   [] Verbal  [] Nonverbal  [x] Unresponsive    HPI/SUBJECTIVE:  Family at bedside. Pt observed in bed. No response to tactile or verbal stimulation. In the last 12 hours has required 2 prn doses of Robinul; 4 prn doses of Ativan; and 8 doses of prn morphine. Continues to demonstrate mild increased work of breathing, with occasional restless movement of extremities and facial grimacing.       REVIEW OF SYSTEMS     The following systems were: [] reviewed  [x] unable to be reviewed    Positive ROS include:  Constitutional: fatigue, weakness, in pain, short of breath  Ears/nose/mouth/throat: increased airway secretions  Respiratory:shortness of breath, wheezing  Gastrointestinal:poor appetite, nausea, vomiting, abdominal pain, constipation, diarrhea  Musculoskeletal:pain, deformities, swelling legs  Neurologic:confusion, hallucinations, weakness  Psychiatric:anxiety, feeling depressed, poor sleep  Endocrine:     Adult Non-Verbal Pain Assessment Score: 4    Face  [] 0   No particular expression or smile  [x] 1   Occasional grimace, tearing, frowning, wrinkled forehead  [] 2   Frequent grimace, tearing, frowning, wrinkled forehead    Activity (movement)  [] 0   Lying quietly, normal position  [x] 1   Seeking attention through movement or slow, cautious movement  [] 2   Restless, excessive activity and/or withdrawal reflexes    Guarding  [x] 0   Lying quietly, no positioning of hands over areas of body  [] 1   Splinting areas of the body, tense  [] 2   Rigid, stiff    Physiology (vital signs)  [x] 0   Stable vital signs  [] 1   Change in any of the following: SBP > 20mm Hg; HR > 20/minute  [] 2   Change in any of the following: SBP > 30mm Hg; HR > 25/minute    Respiratory  [] 0   Baseline RR/SpO2, compliant with ventilator  [] 1   RR > 10 above baseline, or 5% drop SpO2, mild asynchrony with ventilator  [x] 2   RR > 20 above baseline, or 10% drop SpO2, asynchrony with ventilator     FUNCTIONAL ASSESSMENT     Palliative Performance Scale (PPS): 10       PSYCHOSOCIAL/SPIRITUAL ASSESSMENT     Active Problems:    Acute respiratory failure ([facility] [address] (1/23/2023)      Past Medical History:   Diagnosis Date    Arthritis     Asthma     Burning with urination     Cancer ([facility] [address]     Prostate Finished radiation 3/2012    COPD     Essential tremor     GERD (gastroesophageal reflux disease)     Hyperlipidemia     Hypertension     PAD (peripheral artery disease) ([facility])       Past Surgical History:   Procedure Laterality Date    HC STNT SFA VIBN WLGO -H1  12/12/2005    LEFT Superficial Femoral Arter Stent    HX ENDOSCOPY      HX GI      Bleeding ulcers    HX HEART CATHETERIZATION      HX HEENT      L eye    HX HEENT      Cataract    HX ORTHOPAEDIC      R & L Carpal tunnel    HX OTHER SURGICAL      Barrets esophagus    HX UROLOGICAL  05/11/2011    Prostate Bx    HX UROLOGICAL      Brachytherapy    HX UROLOGICAL  05/11/2011    cysto, BRP      Social History     Tobacco Use    Smoking status: Former    Smokeless tobacco: Never   Substance Use Topics    Alcohol use: Not Currently     Comment: twice a month     Family History   Problem Relation Age of Onset    Diabetes Mother     Diabetes Sister     Diabetes Brother       Allergies   Allergen Reactions    Nut - Unspecified Not Reported This Time      Current Facility-Administered Medications   Medication Dose Route Frequency    LORazepam (ATIVAN) injection 2 mg  2 mg IntraVENous Q15MIN PRN    LORazepam (ATIVAN) injection 2 mg 2 mg IntraVENous Q4H    ketorolac (TORADOL) injection 30 mg  30 mg IntraVENous Q8H PRN    bisacodyL (DULCOLAX) suppository 10 mg  10 mg Rectal DAILY PRN    HYDROmorphone (DILAUDID) injection 1 mg  1 mg IntraVENous Q4H    HYDROmorphone (DILAUDID) injection 1 mg  1 mg IntraVENous Q15MIN PRN    glycopyrrolate (ROBINUL) injection 0.2 mg  0.2 mg IntraVENous Q4H        PHYSICAL EXAM     Wt Readings from Last 3 Encounters:   01/23/23 59 kg (130 lb 1.1 oz)   01/22/23 59 kg (130 lb)   07/14/22 62.6 kg (138 lb 0.1 oz)       Visit Vitals  Ht 5' 10\" (1.778 m)   Wt 59 kg (130 lb 1.1 oz)   BMI 18.66 kg/m²       Supplemental O2  [x] Yes 2  [] NO  Last bowel movement:     Currently this patient has:  [x] Peripheral IV [] PICC  [] PORT [] ICD    [] Ivey Catheter [] NG Tube   [] PEG Tube    [] Rectal Tube [] Drain  [] Other:     Constitutional: unresponsive; thin/frail; ill-appearing  Eyes: closed  ENMT: edentulous; oral mucosa dry  Cardiovascular: regular; distal pulses intact; no edema  Respiratory: slight increased work of breathing  Gastrointestinal: soft  Musculoskeletal: no contractures or gross deformities; increased tone to all extremities  Skin: thin/fragile; scattered bruising to bilateral upper extremities; worsening necrosis to L foot  Neurologic: unresponsive  Psychiatric: appears restless at times  Other:     Pertinent Lab and or Imaging Tests:  Lab Results   Component Value Date/Time    Sodium 143 07/19/2022 04:30 AM    Potassium 4.0 07/19/2022 04:30 AM    Chloride 111 (H) 07/19/2022 04:30 AM    CO2 28 07/19/2022 04:30 AM    Anion gap 4 (L) 07/19/2022 04:30 AM    Glucose 95 07/19/2022 04:30 AM    BUN 18 07/19/2022 04:30 AM    Creatinine 0.66 (L) 07/19/2022 04:30 AM    BUN/Creatinine ratio 27 (H) 07/19/2022 04:30 AM    GFR est AA >60 07/19/2022 04:30 AM    GFR est non-AA >60 07/19/2022 04:30 AM    Calcium 8.6 07/19/2022 04:30 AM     Lab Results   Component Value Date/Time    Protein, total 5.6 (L) 07/12/2022 06:24 AM Albumin 2.4 (L) 07/12/2022 06:24 AM           Wyvonna Kocher FNP-INESSA

## 2023-01-23 NOTE — H&P
's  Hospitalist History & Physical Notes. [facility]. Name : Denis Wick      MRN number : [id number]     YOB: 1931     Subjective :   Chief Complaint : respiratory distress, pain not controlled. Source of information : ED provider, family, previous records. History of present illness:   80 y.o. male  with history of dementia, parkinson's disease, Peripheral arterial disease is on hospice care, but his pain is not controlled and he is having respiratory distress. He is already getting pain medications, but as not controlled call the hospice company who recommended to bring to the hospital.  Patient family would like to continue the hospice care but want him to be more comfortable. He is saturating 70% on 15 L nonrebreather on arrival.    Was given pain medications in the emergency room with good control of pain when I seen him. He is not moaning and not much responsive. As per the family for the last 24 hours he is not much alert. At baseline he is bedridden status, with recent history of fracture surgery but unable to improve anything. He is with advanced dementia. Contacted the hospice company but they recommended to have [address] with hospice people to admit, but because it is after hours in the night we are keeping him overnight for comfort care so they can  in the morning. Started on pain medications and anxiety medications that he requires to keep him comfortable, admitted to medical floor.         Past Medical History:   Diagnosis Date    Arthritis     Asthma     Burning with urination     Cancer [facility])     Prostate Finished radiation 3/2012    COPD     Essential tremor     GERD (gastroesophageal reflux disease)     Hyperlipidemia     Hypertension     PAD (peripheral artery disease) (HCC)      Past Surgical History:   Procedure Laterality Date    HC STNT SFA VIBN WLGO -H1  12/12/2005    LEFT Superficial Femoral Arter Stent    HX ENDOSCOPY      HX GI      Bleeding ulcers    HX HEART CATHETERIZATION      HX HEENT      L eye    HX HEENT      Cataract    HX ORTHOPAEDIC      R & L Carpal tunnel    HX OTHER SURGICAL      Barrets esophagus    HX UROLOGICAL  05/11/2011    Prostate Bx    HX UROLOGICAL      Brachytherapy    HX UROLOGICAL  05/11/2011    cysto, BRP     Family History   Problem Relation Age of Onset    Diabetes Mother     Diabetes Sister     Diabetes Brother       Social History     Tobacco Use    Smoking status: Former    Smokeless tobacco: Never   Substance Use Topics    Alcohol use: Not Currently     Comment: twice a month       Allergies   Allergen Reactions    Nut - Unspecified Not Reported This Time      Current Facility-Administered Medications   Medication Dose Route Frequency Provider Last Rate Last Admin    morphine injection 6 mg  6 mg IntraVENous Q30MIN PRN Randa Mijares MD   6 mg at 01/22/23 2215    LORazepam (INTENSOL) 2 mg/mL oral concentrate 1 mg  1 mg Oral Q1H PRN Khang Cheema MD        LORazepam (ATIVAN) injection 1 mg  1 mg IntraVENous Q2H PRN Khang Cheema MD        acetaminophen (TYLENOL) tablet 650 mg  650 mg Oral Q4H PRN Khang Cheema MD        Or    acetaminophen (TYLENOL) solution 650 mg  650 mg Oral Q4H PRN Khang Cheema MD        Or    acetaminophen (TYLENOL) suppository 650 mg  650 mg Rectal Q4H PRN Khang Cheema MD        morphine injection 10 mg  10 mg Inhalation Q1H PRN Khang Cheema MD        morphine (ROXANOL) 100 mg/5 mL (20 mg/mL) concentrated solution 10 mg  10 mg Oral Q1H PRN Khang Cheema MD         Current Outpatient Medications   Medication Sig Dispense Refill    HYDROcodone-acetaminophen (NORCO) 5-325 mg per tablet Take 1 Tablet by mouth every four (4) hours as needed for Severe Pain.      midodrine (PROAMATINE) 2.5 mg tablet Take 2.5 mg by mouth three (3) times daily.       QUEtiapine (SEROquel) 25 mg tablet Take 12.5 mg by mouth daily.      gabapentin (NEURONTIN) 100 mg capsule Take 100 mg by mouth nightly. Milk of Magnesia 400 mg/5 mL suspension Take 15 mL by mouth daily. ClearLax 17 gram/dose powder Take 17 g by mouth daily. cetirizine (ZYRTEC) 10 mg tablet Take  by mouth daily. fluticasone-salmeterol (ADVAIR DISKUS) 250-50 mcg/Dose diskus inhaler Take 1 Puff by inhalation every twelve (12) hours. alfuzosin (UROXATRAL) 10 mg SR tablet Take  by mouth daily. montelukast (SINGULAIR) 10 mg tablet Take 10 mg by mouth daily. albuterol (PROVENTIL VENTOLIN) 2.5 mg /3 mL (0.083 %) nebulizer solution by Nebulization route once. Vitals:   Patient Vitals for the past 12 hrs:   Pulse Resp BP SpO2   01/22/23 2049 90 16 -- (!) 85 %   01/22/23 2041 88 17 -- (!) 84 %   01/22/23 2040 88 17 -- (!) 83 %   01/22/23 2037 90 17 -- (!) 83 %   01/22/23 2035 90 17 -- (!) 83 %   01/22/23 2034 90 16 -- (!) 82 %   01/22/23 2030 90 15 -- (!) 81 %   01/22/23 2028 91 19 -- (!) 82 %   01/22/23 2027 91 17 -- (!) 82 %   01/22/23 2026 91 17 -- (!) 82 %   01/22/23 2021 92 15 -- (!) 85 %   01/22/23 2020 94 17 (!) 86/52 --   01/22/23 2017 94 17 -- (!) 88 %   01/22/23 2016 93 18 -- (!) 88 %   01/22/23 2010 98 18 -- (!) 87 %   01/22/23 2009 96 16 -- (!) 87 %   01/22/23 2008 99 19 -- (!) 86 %   01/22/23 2001 (!) 102 19 -- (!) 86 %   01/22/23 2000 (!) 101 17 -- --   01/22/23 1959 (!) 102 19 -- (!) 85 %   01/22/23 1948 (!) 109 16 -- (!) 83 %   01/22/23 1943 (!) 107 24 -- (!) 82 %   01/22/23 1942 (!) 104 27 -- (!) 84 %   01/22/23 1940 (!) 106 16 -- (!) 80 %   01/22/23 1939 (!) 106 20 -- (!) 80 %   01/22/23 1938 (!) 107 22 -- (!) 76 %   01/22/23 1937 -- -- -- (!) 73 %   01/22/23 1937 (!) 105 22 -- (!) 73 %   01/22/23 1936 (!) 106 28 -- (!) 77 %   01/22/23 1932 (!) 107 21 -- --   01/22/23 1931 (!) 108 21 -- --   01/22/23 1903 (!) 113 27 (!) 149/64 (!) 78 %         Assessment and Plan :      Altered mental status with respiratory failure on hospice care admitted for comfort care. CC : Natali Oneil MD  Signed By: Cristel St MD     January 22, 2023      This dictation was done by dragon, computer voice recognition software. Often unanticipated grammatical, syntax, [place] phones and other interpretive errors are inadvertently transcribed. Please excuse errors that have escaped final proofreading.

## 2023-01-23 NOTE — HOSPICE
Leonora Orourke, the assigned  and myself, the   visited with Mr Aditya Denny and his family to complete the initial assessment and offer support. Mr Aditya Denny was in bed with his daughter and granddaughter at bedside. Both were very supportive and interactive. Both was happy that he has had the opportunity to see his one living brother and that they including his son are able to be with him now. They are pleased with his transition to hospice and the care he is receiving. They noted they are ok with no needs at this time.

## 2023-01-24 NOTE — PROGRESS NOTES
Problem: Falls - Risk of  Goal: *Absence of Falls  Description: Document Danne Lobe Fall Risk and appropriate interventions in the flowsheet. Outcome: Progressing Towards Goal  Note: Fall Risk Interventions:            Medication Interventions: Bed/chair exit alarm    Elimination Interventions: Bed/chair exit alarm              Problem: Patient Education: Go to Patient Education Activity  Goal: Patient/Family Education  Outcome: Progressing Towards Goal     Problem: Pressure Injury - Risk of  Goal: *Prevention of pressure injury  Description: Document Trung Scale and appropriate interventions in the flowsheet. Outcome: Progressing Towards Goal  Note: Pressure Injury Interventions:  Sensory Interventions: Assess changes in LOC    Moisture Interventions: Absorbent underpads    Activity Interventions: Assess need for specialty bed    Mobility Interventions: HOB 30 degrees or less    Nutrition Interventions: Discuss nutritional consult with provider    Friction and Shear Interventions: HOB 30 degrees or less, Minimize layers                Problem: Patient Education: Go to Patient Education Activity  Goal: Patient/Family Education  Outcome: Progressing Towards Goal     Problem: Hospice Orientation  Goal: Demonstrate understanding of hospice philosophy, plan of care, and home hospice program  Description: The patient/family/caregiver will demonstrate understanding of hospice philosophy, plan of care and the home hospice program as evidenced by participation in meeting the patient's psychosocial, spiritual, medical, and physical needs inclusive of medical supplies/equipment focusing on symptoms.   Outcome: Progressing Towards Goal     Problem: Potential for Alteration in Skin Integrity  Goal: Monitor skin for areas of alteration in skin integrity  Description: Patient/family/caregiver will demonstrate ability to care for patient's skin, monitor for areas of breakdown, and demonstrate methods to prevent breakdown during hospice care. Outcome: Progressing Towards Goal     Problem: Risk for Falls  Goal: Free of falls during inpatient stay  Description: Patient will be free of falls during inpatient stay. Outcome: Progressing Towards Goal     Problem: Pain  Goal: *Control of acute pain  Outcome: Progressing Towards Goal     Problem: Breathing Pattern - Ineffective  Goal: *Use of effective breathing techniques  Outcome: Progressing Towards Goal     Problem: General Wound Care  Goal: *Non-infected wound: Improvement of existing wound, absence of infection, and maintenance of skin integrity  Outcome: Progressing Towards Goal  Goal: *Infected Wound: Prevention of further infection  Description: Infection control procedures (eg: clean dressings, clean gloves, hand washing, precautions to isolate wound from contamination, sterile instruments used for wound debridement) should be implemented.   Outcome: Progressing Towards Goal  Goal: Interventions  Outcome: Progressing Towards Goal     Problem: Imminent Death  Goal: Collaborate with patient/family/caregiver/interdisciplinary team to minimize and manage end of life symptoms  Outcome: Progressing Towards Goal

## 2023-01-24 NOTE — PROGRESS NOTES
[facility]   Good Help to Those in Need  [phone]    Patient Name: Emily Lopez  YOB: 1931    Date of Provider Hospice Visit: 01/24/23    Level of Care:   [x] General Inpatient (GIP)    [] Routine   [] Respite    Current Location of Care:  [] [facility] [] [facility] [] [facility] [] General acute hospital [] Hospice [facility], patient referred from:  [] [facility] [] [facility] [] [facility] [] [facility] [] Home [] Other:     Date of Original Hospice Admission: 1/23/2023  Hospice Medical Director at time of admission: Dr. Michelle Herrera Diagnosis: Acute respiratory failure  Diagnoses RELATED to the terminal prognosis: Pain, PCM  Other Diagnoses: Dementia, Parkinson's disease, COPD     HOSPICE SUMMARY     Emily Lopez is a 80y.o. year old  male who was admitted to [facility]. Initially presented to ED with increasing SOB and uncontrolled pain. PMH significant for Parkinson's disease, dementia, PAD, and COPD. Upon arrival SpO2 was in the 70s on 15L via nonrebreather. Prior to hospitalization pt lived alone in private home with paid caregivers, and was on service with another hospice agency with primary hospice dx of Parkinson's disease. Family reports that over the last couple of days he became weaker with increased dyspnea, and increasing pain to L foot with significant increase in necrotic tissue. They have elected to revoke with other agency in order to be admitted for GIP level of care for aggressive symptom management. The patient's principle diagnosis has resulted in altered mental status, increased work of breathing, pain, agitation/restlessness, and increased oropharyngeal secretions. Functionally, the patient's Karnofsky and/or Palliative Performance Scale has declined over a period of days and is estimated at 10. The patient is dependent on the following ADLs: all    The patient/family chose comfort measures with the support of Hospice.      HOSPICE DIAGNOSES Active Symptoms:  1. Altered mental status  2. Agitation/restlessness  3. Increased work of breathing  4. Pain  5. Secretions  6. Hospice care     PLAN   Patient will continue GIP level care as he needs frequent nursing assessments, IV medication management for symptom burden, not tolerating sublingual/oral medication. In reviewing the chart and discussion with family/David hospice liaison/bedside nurse patient continues to show evidence of pain-nonverbal signs as well as shortness of breath/restlessness. Patient's Dilaudid was increased to 2 mg every 4 hours this morning but he also has required for additional doses already today of 2 mg per the bedside nurse secondary to ongoing respiratory distress/pain. Given this amount of Dilaudid this equals approximately 10-12 milligrams over 6 hours which extrapolates to almost 40 mg over 24 hours. Obviously, I do not think he needs that much medication but certainly further adjustments are warranted. Talked with the bedside nurse and he has good veins and appears to be a good candidate for PCA given the amount of as needed's that he is requiring-essentially every hour to hour and a half. Continue Robinul 0.2 mg IV every 4 hours  Pain management -start a PCA of Dilaudid at 0.8mg/hr= equals approximate 19 mg in 24 hours which appears appropriate given his requirements over the last 6 hours. Continue Ativan 2 mg IV every 4 hours with prn dosing available every 15 minutes for breakthrough restlessness/agitation  Continue Toradol 30 mg IV every 8 hours as needed for fever  All other symptom management medications as needed  Oxygen for comfort; may wean as tolerated  Place james catheter for signs of urinary retention; james care per facility protocol  Family at the bedside and reviewed symptom management, medication adjustments, and they certainly are comfortable with adjusting further with the use of the PCA.   Reviewed plan with bedside nurse and she also very comfortable with this adjustment.  and SW to support family needs  Disposition: anticipate death in hospital  Hospice Plan of care was reviewed in detail and agree with current plan of care    Prognosis estimated based on 01/24/23 clinical assessment is:   [x] Hours to Days    [] Days to Weeks    [] Other:    Communicated plan of care with: Hospice Case Manager; Hospice IDT; Care Team     GOALS OF CARE     Patient/Medical POA stated Goal of Care: comfort care and symptom management    [x] I have reviewed and/or updated ACP information in the Advance Care Planning Navigator. This information is available in the [address] link in the patient's chart header. Primary Decision [facility] Agent):   Primary Decision Maker: Jeanne Sihne - [phone]    Secondary Decision Maker: Zaria Kaufman - [phone]    Resuscitation Status: DNR  If DNR is there a Durable DNR on file? : [] Yes [x] No (If no, complete Durable DNR)    HISTORY     History obtained from: chart review; discussion with liaison; discussion with family    CHIEF COMPLAINT: unable to obtain  The patient is:   [] Verbal  [] Nonverbal  [x] Unresponsive    HPI/SUBJECTIVE:  Family at bedside. Pt observed in bed. No response to tactile or verbal stimulation. In the last 12 hours has required 2 prn doses of Robinul; 4 prn doses of Ativan; and 8 doses of prn morphine. Continues to demonstrate mild increased work of breathing, with occasional restless movement of extremities and facial grimacing.     1/24-patient still appears a bit restless and at times, attempting to sit up. Showing evidence of mild to moderate respiratory distress with accessory muscle use. Significant plaque in the left foot that is tracking proximally to the ankle.      REVIEW OF SYSTEMS     The following systems were: [] reviewed  [x] unable to be reviewed    Positive ROS include:  Constitutional: fatigue, weakness, in pain, short of breath  Ears/nose/mouth/throat: increased airway secretions  Respiratory:shortness of breath, wheezing  Gastrointestinal:poor appetite, nausea, vomiting, abdominal pain, constipation, diarrhea  Musculoskeletal:pain, deformities, swelling legs  Neurologic:confusion, hallucinations, weakness  Psychiatric:anxiety, feeling depressed, poor sleep  Endocrine:     Adult Non-Verbal Pain Assessment Score: 6    Face  [] 0   No particular expression or smile  [] 1   Occasional grimace, tearing, frowning, wrinkled forehead  [x] 2   Frequent grimace, tearing, frowning, wrinkled forehead    Activity (movement)  [] 0   Lying quietly, normal position  [] 1   Seeking attention through movement or slow, cautious movement  [x] 2   Restless, excessive activity and/or withdrawal reflexes    Guarding  [x] 0   Lying quietly, no positioning of hands over areas of body  [] 1   Splinting areas of the body, tense  [] 2   Rigid, stiff    Physiology (vital signs)  [x] 0   Stable vital signs  [] 1   Change in any of the following: SBP > 20mm Hg; HR > 20/minute  [] 2   Change in any of the following: SBP > 30mm Hg; HR > 25/minute    Respiratory  [] 0   Baseline RR/SpO2, compliant with ventilator  [] 1   RR > 10 above baseline, or 5% drop SpO2, mild asynchrony with ventilator  [x] 2   RR > 20 above baseline, or 10% drop SpO2, asynchrony with ventilator     FUNCTIONAL ASSESSMENT     Palliative Performance Scale (PPS): 10       PSYCHOSOCIAL/SPIRITUAL ASSESSMENT     Active Problems:    [facility] (1/23/2023)    Past Medical History:   Diagnosis Date    Arthritis     Asthma     Burning with urination     Cancer [facility])     Prostate Finished radiation 3/2012    COPD     Essential tremor     GERD (gastroesophageal reflux disease)     Hyperlipidemia     Hypertension     PAD (peripheral artery disease) ([facility]       Past Surgical History:   Procedure Laterality Date    HC STNT SFA VIBN WLGO -H1  12/12/2005    LEFT Superficial Femoral Arter Stent    HX ENDOSCOPY      HX GI      Bleeding ulcers    HX HEART CATHETERIZATION      HX HEENT      L eye    HX HEENT      Cataract    HX ORTHOPAEDIC      R & L Carpal tunnel    HX OTHER SURGICAL      Barrets esophagus    HX UROLOGICAL  05/11/2011    Prostate Bx    HX UROLOGICAL      Brachytherapy    HX UROLOGICAL  05/11/2011    cysto, BRP      Social History     Tobacco Use    Smoking status: Former    Smokeless tobacco: Never   Substance Use Topics    Alcohol use: Not Currently     Comment: twice a month     Family History   Problem Relation Age of Onset    Diabetes Mother     Diabetes Sister     Diabetes Brother       Allergies   Allergen Reactions    Nut - Unspecified Not Reported This Time      Current Facility-Administered Medications   Medication Dose Route Frequency    HYDROmorphone (DILAUDID) injection 2 mg  2 mg IntraVENous Q15MIN PRN    ketorolac (TORADOL) injection 30 mg  30 mg IntraVENous Q6H PRN    acetaminophen (TYLENOL) suppository 650 mg  650 mg Rectal Q4H PRN    HYDROmorphone (DILAUDID) 15 mg/30 mL (0.5 mg/mL) infusion  0.8 mg/hr IntraVENous CONTINUOUS    LORazepam (ATIVAN) injection 2 mg  2 mg IntraVENous Q15MIN PRN    LORazepam (ATIVAN) injection 2 mg  2 mg IntraVENous Q4H    bisacodyL (DULCOLAX) suppository 10 mg  10 mg Rectal DAILY PRN    glycopyrrolate (ROBINUL) injection 0.2 mg  0.2 mg IntraVENous Q4H        PHYSICAL EXAM     Wt Readings from Last 3 Encounters:   01/23/23 59 kg (130 lb 1.1 oz)   01/22/23 59 kg (130 lb)   07/14/22 62.6 kg (138 lb 0.1 oz)       Visit Vitals  BP (!) 96/50 (BP 1 Location: Left upper arm, BP Patient Position: At rest)   Pulse 77   Temp 100.1 °F (37.8 °C)   Resp 12   Ht 5' 10\" (1.778 m)   Wt 59 kg (130 lb 1.1 oz)   SpO2 (!) 71%   BMI 18.66 kg/m²       Supplemental O2  [x] Yes 2  [] NO  Last bowel movement:     Currently this patient has:  [x] Peripheral IV [] PICC  [] PORT [] ICD    [] Ivey Catheter [] NG Tube   [] PEG Tube    [] Rectal Tube [] Drain  [] Other: Constitutional: unresponsive; thin/frail; ill-appearing, still restless, family at the bedside  Eyes: closed  ENMT: edentulous; oral mucosa dry  Cardiovascular: regular; distal pulses intact; no edema  Respiratory: Mild to moderate increased work of breathing, definitely using accessory muscles especially his abdomen  Gastrointestinal: soft  Musculoskeletal: no contractures or gross deformities; increased tone to all extremities  Skin: thin/fragile; scattered bruising to bilateral upper extremities; worsening necrosis to L foot-blackened to the ankle  Neurologic: unresponsive  Psychiatric: appears restless at times  Other:     Pertinent Lab and or Imaging Tests:  Lab Results   Component Value Date/Time    Sodium 143 07/19/2022 04:30 AM    Potassium 4.0 07/19/2022 04:30 AM    Chloride 111 (H) 07/19/2022 04:30 AM    CO2 28 07/19/2022 04:30 AM    Anion gap 4 (L) 07/19/2022 04:30 AM    Glucose 95 07/19/2022 04:30 AM    BUN 18 07/19/2022 04:30 AM    Creatinine 0.66 (L) 07/19/2022 04:30 AM    BUN/Creatinine ratio 27 (H) 07/19/2022 04:30 AM    GFR est AA >60 07/19/2022 04:30 AM    GFR est non-AA >60 07/19/2022 04:30 AM    Calcium 8.6 07/19/2022 04:30 AM     Lab Results   Component Value Date/Time    Protein, total 5.6 (L) 07/12/2022 06:24 AM    Albumin 2.4 (L) 07/12/2022 06:24 AM

## 2023-01-24 NOTE — FACE TO FACE
[facility]   Good Help to Those in Need  FACE TO Lady Coffman  [phone]    Patient Name: Lauren Casiano  YOB: 1931    Date of Face to Face Visit: 01/23/23    Location of Visit:  [] [facility] [] [facility] [] ED [facility] [x] [address] [] Yariel Smart [address] [facility]  [] Home [] Other:      Principle Hospice Diagnosis: Acute respiratory failure  Related Hospice diagnosis: COPD, Parkinson's disease, dementia, PAD    Benefit period number: unknown at time of documentation  First day of this BP benefit period: 1/23/2023     HOSPICE SUMMARY    Lauren Casiano is a 80y.o. year old  male who was admitted to [facility] on [facility] level of care. Initially presented to ED with increasing SOB and uncontrolled pain. PMH significant for Parkinson's disease, dementia, PAD, and COPD. Upon arrival SpO2 was in the 70s on 15L via nonrebreather. Prior to hospitalization pt lived alone in private home with paid caregivers, and was on service with another hospice agency with primary hospice dx of Parkinson's disease. Family reports that over the last couple of days he became weaker with increased dyspnea, and increasing pain to L foot with significant increase in necrotic tissue. They have elected to revoke with other agency in order to be admitted for [facility] level of care for aggressive symptom management. The patient's principle diagnosis has resulted in altered mental status, increased work of breathing, pain, agitation/restlessness, and increased oropharyngeal secretions. Functionally, the patient's Karnofsky and/or Palliative Performance Scale has declined over a period of days and is estimated at 10.       The patient is dependent on the following ADLs: all      GOALS OF CARE     Resuscitation Status: DNR  Durable DNR: [] Yes [x] No      Primary Decision MakerCtana Zhou - [phone]    Secondary Decision Maker: Daniel Aquino - Caregiver - [phone]    Advance Care Planning 7/12/2022   Confirm Advance Directive None   Patient Would Like to Complete Advance Directive Unable        HISTORY     History obtained from: chart, interdisciplinary team, and family    CHIEF COMPLAINT: unable to obtain  The patient is:   [] Verbal  [] Nonverbal  [x] Unresponsive    HPI/SUBJECTIVE:  Admitted to GIP level of care for management of worsening symptom burden as PO/SL medications ineffective; symptoms include altered mental status, nonverbal signs of pain, agitation, increased work of breathing, and increased oropharyngeal secretions. I reviewed interdisciplinary assessments for this benefit period in the patient's chart. DISEASE SPECIFIC INFORMATION     Hospice Guidelines, General Information    A patient will be considered to have a life expectancy of six months or less if he/she meets the non-disease specific decline in clinical status guidelines described in Part I. Alternatively, the baseline non-disease specific guidelines described in Part II plus the applicable disease specific guidelines listed in the appendix will establish the necessary expectancy. Part I. Decline in clinical status guidelines     Patients will be considered to have a life expectancy of six months or less if there is documented evidence of decline in clinical status based on the guidelines listed below. Since determination of decline presumes assessment of the patients status over time, it is essential that both baseline and follow-up determinations be reported where appropriate. Baseline data may be established on admission to hospice or by using existing information from records. Other clinical variables not on this list may support a six-month or less life expectancy. These should be documented in the clinical record. These changes in clinical variables apply to patients whose decline is not considered to be reversible.  They are listed in order of their likelihood to predict poor survival, the most predictive first and the least predictive last. No specific number of variables must be met, but fewer of those listed first (more predictive) and more of those listed last (least predictive) would be expected to predict longevity of six months or less. 1. Progression of disease as documented by worsening clinical status, symptoms, signs and laboratory results      A. Clinical Status    1) Recurrent or intractable infections such as pneumonia, sepsis or upper urinary tract. 2) Progressive inanition as documented by:    a) Weight loss not due to reversible causes such as depression or use of diuretics  b) Decreasing anthropomorphic measurements (mid-arm circumference, abdominal girth), not due to reversible causes such as depression or use of diuretics  c) Decreasing serum albumin or cholesterol    3) Dysphagia leading to recurrent aspiration and/or inadequate oral intake documented by decreasing food portion consumption. B. Symptoms    1) Dyspnea with increasing respiratory rate  2) Cough, intractable   3) Nausea/vomiting poorly responsive to treatment  4) Diarrhea, intractable  5) Pain requiring increasing doses of major analgesics more than briefly. C. Signs    1) Decline in systolic blood pressure to below 90 or progressive postural hypotension  2) Ascites  3) Venous, arterial or lymphatic obstruction due to local progression or metastatic disease  4) Edema  5) Pleural / pericardial effusion  6) Weakness  7) Change in level of consciousness      D. Laboratory (When available. Lab testing is not required to establish hospice eligibility.)    1) Increasing pCO2 or decreasing pO2 or decreasing SaO2  2) Increasing calcium, creatinine or liver function studies  3) Increasing tumor markers (e.g. CEA, PSA)  4) Progressively decreasing or increasing serum sodium or increasing serum potassium    2. Decline in Karnofsky Performance Status (KPS) or Palliative Performance Score (PPS) from <70% due to progression of disease.     3. Increasing emergency room visits, hospitalizations, or physicians visits related to hospice primary diagnosis    4. Progressive decline in Functional Assessment Staging (FAST) for dementia (from ? 7A on the FAST)    5. Progression to dependence on assistance with additional activities of daily living (See Part II, Section 2)    6. Progressive stage 3-4 pressure ulcers in spite of optimal care    Part II. Non-disease specific baseline guidelines   (both of these should be met)    1. Physiologic impairment of functional status as demonstrated by:  Karnofsky Performance Status (KPS) or Palliative Performance Score (PPS) <70%. Note that two of the disease specific guidelines (HIV Disease, Stroke and Coma) establish a lower qualifying KPS or PPS. 2. Dependence on assistance for two or more activities of daily living (ADLs)    A. Feeding  B. Ambulation  C. Continence  D. Transfer  E. [address]      See appendix for disease specific guidelines to be used with these (Part II) baseline guidelines. The baseline guidelines do not independently qualify a patient for hospice coverage. Note: The word should in the disease specific guidelines means that on medical review the guideline so identified will be given great weight in making a coverage determination. It does not mean, however, that meeting the guideline is obligatory. Part III. Co-morbidities   Although not the primary hospice diagnosis, the presence of disease such as the following, the severity of which is likely to contribute to a life expectancy of six months or less, should be considered in determining hospice eligibility. A. Chronic obstructive pulmonary disease  B. Congestive heart failure  C. Ischemic heart disease  D. Diabetes mellitus  E. Neurologic disease (CVA, ALS, MS, Parkinsons)  F. Renal failure  G. Liver Disease  H. Neoplasia  I. Acquired immune deficiency syndrome  J.  Dementia      FUNCTIONAL ASSESSMENT     Palliative Performance Scale (PPS): 10     PHYSICAL EXAM     Wt Readings from Last 3 Encounters:   01/23/23 59 kg (130 lb 1.1 oz)   01/22/23 59 kg (130 lb)   07/14/22 62.6 kg (138 lb 0.1 oz)     Currently this patient has:    Supplemental O2  [x] Yes 2L NC  [] NO   [] PORT  [] ICD    [] Ivey Catheter  [] PEG Tube    [] Drain   [] Other:     Constitutional: unresponsive; thin/frail; ill-appearing  Eyes: closed  ENMT: edentulous; oral mucosa dry  Cardiovascular: regular; distal pulses intact; no edema  Respiratory: slight increased work of breathing  Gastrointestinal: soft  Musculoskeletal: no contractures or gross deformities; increased tone to all extremities  Skin: thin/fragile; scattered bruising to bilateral upper extremities; worsening necrosis to L foot  Neurologic: unresponsive  Psychiatric: appears restless at times  Other:         Vernon Garcias FNP-C

## 2023-01-24 NOTE — HOSPICE
[facility]  [phone]    [facility] Daily Nursing Note   Patient Name: Mary Ann Valencia  YOB: 1931  Age: 80 y.o. Date of Visit: 01/24/23  Facility of Care: [facility]  Patient Room: 225/01     Hospice Attending: Cathi Quintero MD  Hospice Diagnosis: Acute respiratory failure ([address] [J96.00]    Level of Care: GIP    Current GIP Symptoms      Increased work of breathing  Non verbal pain  Secretions  Febrile    ASSESSMENT & PLAN     1. Increase Hydromorphone 2 mg IV every 4 hours and every 15 min prn  2. Continue Lorazepam 2 mg IV every 4 hours and every 15 min prn  3. Increase frequency of Toradol 30 mg IV every 6 hours prn  4. Add Tylenol 650 mg suppository every 4 hours prn  5. Other comfort measures as needed  6. Continue to support family at bedside      Spiritual Interventions: Hospice and hospital chaplains providing spiritual support    Psych/ Social/ Emotional Interventions: Family holding addison. Expressing appreciation for hospice care. Care Coordination Needs: Frequent communication with family, hospital staff and hospice team    Care plan and New Orders discussed / approved with CAMERON Randhawa MD.    Description History and Chart Review     Narrative History of last 24 hours that demonstrates care cannot be provided in another setting:  Patient requires ongoing nursing assessment and management of IV medications that cannot be administered in the home. What has been done to control the patient's symptoms in the last 24 hours? Scheduled Hydromorphone and Lorazepam    Does the patient currently require IV medications? yes  Does the patient currently require scheduled medications? yes  Does the patient currently require a PCA?  no    List number of doses of PRN medications in last 24 hours:  Medication 1: Hydromorphone 1 mg  Number of doses: 6    Medication 2: Lorazepam 2mg  Number of doses: 6    Medication 3:   Number of doses:    Supporting documentation for GIP need for pain control:  [x] Frequent evaluation by a doctor, nurse practitioner, nurse   [x] Frequent medication adjustment    [x] IVs that cannot be administered at home   [x] Aggressive pain management   [] Complicated technical delivery of medications              Supporting documentation for GIP need for symptom control:  []  Sudden decline necessitating intensive nursing intervention  []  Uncontrolled / intractable nausea or vomiting   []  Pathological fractures  []  Advanced open wounds requiring frequent skilled care  [x] Unmanageable respiratory distress  [] New or worsening delirium   [] Delirium with behavior issues: Is 24 hour caregiver present due to safety concerns with agitation? (yes/no)  [] Imminent death - with skilled nursing needs documented above    DISCHARGE PLANNING     1. Discharge Plan: Discharge to LTC should condition stabilize. Patient will most likely die in hospital  2. Patient/Family teaching: Symptom management  3. Response to patient/family teaching: Acknowledge and agree with established POC    ASSESSMENT    KARNOFSKY: 10    Prognosis estimated based on 01/24/23 clinical assessment is:   [] Few to Many Hours  [x] Hours to Days   [] Few to Many Days   [] Days to Weeks   [] Few to Many Weeks   [] Weeks to Months   [] Few to Many Months    Quality Measure: Patient self-reports:  [] Yes    [x] No    ESAS:   Time of Assessment: 10:15  Pain (1-10): 4  Fatigue (1-10):   Shortness of breath (1-10): 4  Nausea (1-10): Appetite (1-10):    Anxiety: (1-10):   Depression: (1-10):   Well-being: (1-10):   Constipation: _ Yes  _ No  LAST BM:     CLINICAL INFORMATION   Patient Vitals for the past 12 hrs:   Temp Pulse Resp BP SpO2   01/24/23 1141 100.1 °F (37.8 °C) -- -- -- --   01/24/23 0749 100 °F (37.8 °C) 77 12 (!) 96/50 (!) 71 %       Currently this patient has:  [] Supplemental O2   [x] IV    [] PICC      [] PORT   [] NG Tube    [] PEG Tube   [] Ostomy     [] Ivey draining _______ urine  [] Other:     SIGNS/PHYSICAL FINDINGS     Skin (including wound):  [] Warm, dry, supple, intact and color normal for race  [x] Warm   [] Dry   [] Cool     [x] Clammy       [] Diaphoretic    Turgor   [] Normal   [x] Decreased  Color:   [] Pink   [] Pale   [] Cyanotic   [] Erythema   [] Jaundice   [] Normal for Race  []  Wounds:    Neuro:  [] Lethargy  [] Restlessness / agitation  [] Confusion / delirium  [] Hallucinations  [x] Responds to maximal stimulation  [] Unresponsive  [] Seizures     Cardiac:  [] Dyspnea on Exertion  [] JVD  [] Murmur  [] Palpitations  [x] Hypotension  [] Hypertension  [] Tachycardia  [] Bradycardia  [] Irregular HR  [] Pulses Decreased  [] Pulses Absent  [] Edema:         [] Mottling:          Respiratory:  Breath sounds:    [x] Diminished   [] Wheeze   [] Rhonchi   [] Rales   [x] Even and unlabored  [] Labored  [] Cough   [] Non Productive   [] Productive    [] Description:           [] Deep suctioned   [] O2 at ___ LPM  [] High flow oxygen greater than 10 LPM  [] Bi-Pap    GI  [] Abdomen (describe)   [] Ascites  [] Nausea  [] Vomiting  [] Incontinent of bowels  [] Bowel sounds (yes/no)  [] Diarrhea  [] Constipation (see above including last bowel movement)  [] Checked for impaction  [x] Last BM PTA    Nutrition  Diet: NPO  Appetite:   [] Good   [] Fair   [] Poor   [] Tube Feeding       [] Voiding  [x] Incontinent   [] Ivey    Musculoskeletal  [] Balance/[place] Unsteady   [] Weak   Strength:    [] Normal    [] Limited    [] Decreasing   Activities:    [] Up as tolerated   [x] Bedridden    [] Specify:    SAFETY  [x] 24 hr. Caregiver   [x] Side rails ? [x] Hospital bed   [x] Reviewed Falls & Safety       ALLERGIES AND MEDICATIONS     Allergies:    Allergies   Allergen Reactions    Nut - Unspecified Not Reported This Time       Current Facility-Administered Medications   Medication Dose Route Frequency    HYDROmorphone (DILAUDID) injection 2 mg  2 mg IntraVENous Q4H    HYDROmorphone (DILAUDID) injection 2 mg  2 mg IntraVENous Q15MIN PRN    ketorolac (TORADOL) injection 30 mg  30 mg IntraVENous Q6H PRN    acetaminophen (TYLENOL) suppository 650 mg  650 mg Rectal Q4H PRN    LORazepam (ATIVAN) injection 2 mg  2 mg IntraVENous Q15MIN PRN    LORazepam (ATIVAN) injection 2 mg  2 mg IntraVENous Q4H    bisacodyL (DULCOLAX) suppository 10 mg  10 mg Rectal DAILY PRN    glycopyrrolate (ROBINUL) injection 0.2 mg  0.2 mg IntraVENous Q4H          Visit Time In: 10:15  Visit Time Out: 11:00

## 2023-01-25 NOTE — PROGRESS NOTES
Problem: Hospice Orientation  Goal: Demonstrate understanding of hospice philosophy, plan of care, and home hospice program  Description: The patient/family/caregiver will demonstrate understanding of hospice philosophy, plan of care and the home hospice program as evidenced by participation in meeting the patient's psychosocial, spiritual, medical, and physical needs inclusive of medical supplies/equipment focusing on symptoms. Outcome: Progressing Towards Goal     Problem: Risk for Falls  Goal: Free of falls during inpatient stay  Description: Patient will be free of falls during inpatient stay.   Outcome: Progressing Towards Goal     Problem: Pain  Goal: *Control of acute pain  Outcome: Progressing Towards Goal     Problem: Breathing Pattern - Ineffective  Goal: *Use of effective breathing techniques  Outcome: Progressing Towards Goal     Problem: Imminent Death  Goal: Collaborate with patient/family/caregiver/interdisciplinary team to minimize and manage end of life symptoms  Outcome: Progressing Towards Goal

## 2023-01-25 NOTE — PROGRESS NOTES
Patient without respirations, pulse, pupils fixed. Per protocol patient was pronounced at [phone] with family bedside. Hospice chaplain and nurse notified. Nursing supervisor notified.

## 2023-01-25 NOTE — PROGRESS NOTES
Spiritual Care Assessment/Progress Note  [facility]      NAME: Indiana Mercado      MRN: [id number]  AGE: 80 y.o. SEX: male  Taoism Affiliation: Jehovah's witness   Language: English     2023     Total Time (in minutes): 35     Spiritual Assessment begun in [facility] through conversation with:         []Patient        [x] Family    [] Friend(s)        Reason for Consult: Death, Hospice     Spiritual beliefs: (Please include comment if needed)     [x] Identifies with a lawrence tradition:    Jehovah's witness      [] Supported by a lawrence community:            [] Claims no spiritual orientation:           [] Seeking spiritual identity:                [] Adheres to an individual form of spirituality:           [] Not able to assess:                           Identified resources for coping:      [x] Prayer                               [] Music                  [] Guided Imagery     [x] Family/friends                 [] Pet visits     [] Devotional reading                         [] Unknown     [] Other:                                               Interventions offered during this visit: (See comments for more details)    Patient Interventions: Prayer (actual)     Family/Friend(s):  Affirmation of emotions/emotional suffering, Affirmation of lawrence, Confucianist/blessing, Catharsis/review of pertinent events in supportive environment, /memorial planning, Coping skills reviewed/reinforced, Initial Assessment, Taoism beliefs/image of God discussed, Prayer (actual), Prayer (assurance of), Normalization of emotional/spiritual concerns, Other (comment) (Family Care)     Plan of Care:     [] Support spiritual and/or cultural needs    [] Support AMD and/or advance care planning process      [] Support grieving process   [] Coordinate Rites and/or Rituals    [] Coordination with community clergy   [] No spiritual needs identified at this time   [] Detailed Plan of Care below (See Comments)  [] Make referral to Music Therapy  [] Make referral to Pet Therapy     [] Make referral to Addiction services  [] Make referral to [facility]  [] Make referral to Spiritual Care Partner  [] No future visits requested        [x] Contact Spiritual Care for further referrals     Comments:   Family requested spiritual support for them and hospice patient, Mr. Avery Adam on [address]  arrived as Pt passed away surrounded by his loved ones. They introduced themselves to  and shared about Mr Mitchell Ashby and asked for prayer.  provided prayer and family support. In addition, family still requesting  come and also provide prayer and blessing.  called  on-call again and left message. Advised nurse to contact [facility] for any further referrals.     BIB Michaels.Kirti.  Marj Villegas ([id number])

## 2023-01-25 NOTE — PROGRESS NOTES
[address] [place] [address]   [facility]  [phone]    Patient Name: Evelyn Choi  YOB: 1931    Date of Provider Hospice Visit: 01/25/23    Level of Care:   [x] General Inpatient (GIP)    [] Routine   [] Respite    Current Location of Care:  [] [facility] [] [facility] [] [address] [] [facility] [] Hospice House [facility], patient referred from:  [] [facility] [] [facility] [] [address] [] [facility] [] Home [] Other:     Date of Original Hospice Admission: 1/23/2023  Hospice Medical Director at time of admission: Dr. Aguero Common Diagnosis: Acute respiratory failure  Diagnoses RELATED to the terminal prognosis: Pain, PCM  Other Diagnoses: Dementia, Parkinson's disease, COPD     HOSPICE SUMMARY     Evelyn Choi is a 80y.o. year old  male who was admitted to [address] [place] [address]. Initially presented to ED with increasing SOB and uncontrolled pain. PMH significant for Parkinson's disease, dementia, PAD, and COPD. Upon arrival SpO2 was in the 70s on 15L via nonrebreather. Prior to hospitalization pt lived alone in private home with paid caregivers, and was on service with another hospice agency with primary hospice dx of Parkinson's disease. Family reports that over the last couple of days he became weaker with increased dyspnea, and increasing pain to L foot with significant increase in necrotic tissue. They have elected to revoke with other agency in order to be admitted for GIP level of care for aggressive symptom management. The patient's principle diagnosis has resulted in altered mental status, increased work of breathing, pain, agitation/restlessness, and increased oropharyngeal secretions. Functionally, the patient's Karnofsky and/or Palliative Performance Scale has declined over a period of days and is estimated at 10. The patient is dependent on the following ADLs: all    The patient/family chose comfort measures with the support of Hospice.      HOSPICE DIAGNOSES Active Symptoms:  1. Altered mental status  2. Agitation/restlessness  3. Increased work of breathing  4. Pain  5. Secretions  6. Hospice care  7. Terminal fever     PLAN   Continue GIP level of care at pt requires frequent nursing assessment and administration and titration of parenteral medications to manage symptom burden; has required 3 additional doses of prn Dilaudid 2 mg IV  today for non verbal signs of pain and increased work of breathing; audible secretions; increased generalized pallor; febrile with tMax 102; feet cold to touch with worsening necrosis L foot and mottling to R foot; appears imminent  Continue Robinul 0.2 mg IV every 4 hours  Increase Dilaudid PCA to 1 mg/hr with nurse given prn dosing 2 mg IV available every 15 minutes for signs of breakthrough pain or air hunger  Continue Ativan 2 mg IV every 4 hours with prn dosing available every 15 minutes for breakthrough restlessness/agitation  Continue Toradol 30 mg IV every 6 hours as needed for fever  All other symptom management medications as needed  Oxygen for comfort; may wean as tolerated  Place james catheter for signs of urinary retention; james care per facility protocol  Family at the bedside and reviewed symptom management, medication adjustments, and they certainly are comfortable with adjusting further with the use of the PCA  Reviewed plan with bedside nurse and she also very comfortable with this adjustment.  and SW to support family needs; will reach out to hospital  to see if  available to perform Anointing of the Sick  Disposition: anticipate death in hospital  Hospice Plan of care was reviewed in detail and agree with current plan of care    Prognosis estimated based on 01/25/23 clinical assessment is:   [x] Hours to Days    [] Days to Weeks    [] Other:    Communicated plan of care with: Hospice Case Manager;  Hospice IDT; Care Team     GOALS OF CARE     Patient/Medical POA stated Goal of Care: comfort care and symptom management    [x] I have reviewed and/or updated ACP information in the Advance Care Planning Navigator. This information is available in the [address] link in the patient's chart header. Primary Decision [facility] Agent):   Primary Decision Maker: Sahara Liang - [phone]    Secondary Decision Maker: Kat Esquivel - Caregiver - [phone]    Resuscitation Status: DNR  If DNR is there a Durable DNR on file? : [] Yes [x] No (If no, complete Durable DNR)    HISTORY     History obtained from: chart review; discussion with liaison; discussion with family    CHIEF COMPLAINT: unable to obtain  The patient is:   [] Verbal  [] Nonverbal  [x] Unresponsive    HPI/SUBJECTIVE:  Family at bedside. Pt observed in bed. No response to tactile or verbal stimulation. In the last 12 hours has required 2 prn doses of Robinul; 4 prn doses of Ativan; and 8 doses of prn morphine. Continues to demonstrate mild increased work of breathing, with occasional restless movement of extremities and facial grimacing.     1/24-patient still appears a bit restless and at times, attempting to sit up. Showing evidence of mild to moderate respiratory distress with accessory muscle use. Significant plaque in the left foot that is tracking proximally to the ankle. 1/25- Family keeping addison at bedside. Pt unresponsive. Has demonstrated increased work of breathing and nonverbal signs of pain requiring administration of additional prn medication. Respirations shallow with periods of apnea but continued use of accessory muscles. Skin warm to touch with documented fever 102. Increased generalized pallor. Feet cool to touch with increasing necrosis to L foot and mottling to R foot. Appears imminent.       REVIEW OF SYSTEMS     The following systems were: [] reviewed  [x] unable to be reviewed    Positive ROS include:  Constitutional: fatigue, weakness, in pain, short of breath  Ears/nose/mouth/throat: increased airway secretions  Respiratory:shortness of breath, wheezing  Gastrointestinal:poor appetite, nausea, vomiting, abdominal pain, constipation, diarrhea  Musculoskeletal:pain, deformities, swelling legs  Neurologic:confusion, hallucinations, weakness  Psychiatric:anxiety, feeling depressed, poor sleep  Endocrine:     Adult Non-Verbal Pain Assessment Score: 3    Face  [x] 0   No particular expression or smile  [] 1   Occasional grimace, tearing, frowning, wrinkled forehead  [] 2   Frequent grimace, tearing, frowning, wrinkled forehead    Activity (movement)  [x] 0   Lying quietly, normal position  [] 1   Seeking attention through movement or slow, cautious movement  [] 2   Restless, excessive activity and/or withdrawal reflexes    Guarding  [x] 0   Lying quietly, no positioning of hands over areas of body  [] 1   Splinting areas of the body, tense  [] 2   Rigid, stiff    Physiology (vital signs)  [] 0   Stable vital signs  [x] 1   Change in any of the following: SBP > 20mm Hg; HR > 20/minute  [] 2   Change in any of the following: SBP > 30mm Hg; HR > 25/minute    Respiratory  [] 0   Baseline RR/SpO2, compliant with ventilator  [] 1   RR > 10 above baseline, or 5% drop SpO2, mild asynchrony with ventilator  [x] 2   RR > 20 above baseline, or 10% drop SpO2, asynchrony with ventilator     FUNCTIONAL ASSESSMENT     Palliative Performance Scale (PPS): 10       PSYCHOSOCIAL/SPIRITUAL ASSESSMENT     Active Problems:    Acute respiratory failure ([address] (1/23/2023)    Past Medical History:   Diagnosis Date    Arthritis     Asthma     Burning with urination     Cancer [facility])     Prostate Finished radiation 3/2012    COPD     Essential tremor     GERD (gastroesophageal reflux disease)     Hyperlipidemia     Hypertension     PAD (peripheral artery disease) (HCC)       Past Surgical History:   Procedure Laterality Date    HC STNT SFA VIBN WLGO -H1  12/12/2005    LEFT Superficial Femoral Arter Stent    HX ENDOSCOPY      HX GI Bleeding ulcers    HX HEART CATHETERIZATION      HX HEENT      L eye    HX HEENT      Cataract    HX ORTHOPAEDIC      R & L Carpal tunnel    HX OTHER SURGICAL      Barrets esophagus    HX UROLOGICAL  05/11/2011    Prostate Bx    HX UROLOGICAL      Brachytherapy    HX UROLOGICAL  05/11/2011    cysto, BRP      Social History     Tobacco Use    Smoking status: Former    Smokeless tobacco: Never   Substance Use Topics    Alcohol use: Not Currently     Comment: twice a month     Family History   Problem Relation Age of Onset    Diabetes Mother     Diabetes Sister     Diabetes Brother       Allergies   Allergen Reactions    Nut - Unspecified Not Reported This Time      Current Facility-Administered Medications   Medication Dose Route Frequency    HYDROmorphone (DILAUDID) injection 2 mg  2 mg IntraVENous Q15MIN PRN    ketorolac (TORADOL) injection 30 mg  30 mg IntraVENous Q6H PRN    acetaminophen (TYLENOL) suppository 650 mg  650 mg Rectal Q4H PRN    HYDROmorphone (DILAUDID) 15 mg/30 mL (0.5 mg/mL) infusion  1 mg/hr IntraVENous CONTINUOUS    LORazepam (ATIVAN) injection 2 mg  2 mg IntraVENous Q15MIN PRN    LORazepam (ATIVAN) injection 2 mg  2 mg IntraVENous Q4H    bisacodyL (DULCOLAX) suppository 10 mg  10 mg Rectal DAILY PRN    glycopyrrolate (ROBINUL) injection 0.2 mg  0.2 mg IntraVENous Q4H        PHYSICAL EXAM     Wt Readings from Last 3 Encounters:   01/23/23 59 kg (130 lb 1.1 oz)   01/22/23 59 kg (130 lb)   07/14/22 62.6 kg (138 lb 0.1 oz)       Visit Vitals  BP (!) 110/52   Pulse 89   Temp (!) 102 °F (38.9 °C)   Resp 12   Ht 5' 10\" (1.778 m)   Wt 59 kg (130 lb 1.1 oz)   SpO2 (!) 56%   BMI 18.66 kg/m²       Supplemental O2  [] Yes 2  [x] NO  Last bowel movement:     Currently this patient has:  [x] Peripheral IV [] PICC  [] PORT [] ICD    [] Ivey Catheter [] NG Tube   [] PEG Tube    [] Rectal Tube [] Drain  [] Other:     Constitutional: unresponsive; thin/frail; ill-appearing, family at the bedside  Eyes: closed  ENMT: edentulous; oral mucosa dry  Cardiovascular: regular; distal pulses intact; no edema  Respiratory: Mild to moderate increased work of breathing, definitely using accessory muscles especially his abdomen  Gastrointestinal: soft  Musculoskeletal: no contractures or gross deformities; increased tone to all extremities  Skin: thin/fragile; scattered bruising to bilateral upper extremities; worsening necrosis to L foot-blackened to the ankle; feet cold tough with increased mottling to R foot  Neurologic: unresponsive  Psychiatric: appears calm  Other:     Pertinent Lab and or Imaging Tests:  Lab Results   Component Value Date/Time    Sodium 143 07/19/2022 04:30 AM    Potassium 4.0 07/19/2022 04:30 AM    Chloride 111 (H) 07/19/2022 04:30 AM    CO2 28 07/19/2022 04:30 AM    Anion gap 4 (L) 07/19/2022 04:30 AM    Glucose 95 07/19/2022 04:30 AM    BUN 18 07/19/2022 04:30 AM    Creatinine 0.66 (L) 07/19/2022 04:30 AM    BUN/Creatinine ratio 27 (H) 07/19/2022 04:30 AM    GFR est AA >60 07/19/2022 04:30 AM    GFR est non-AA >60 07/19/2022 04:30 AM    Calcium 8.6 07/19/2022 04:30 AM     Lab Results   Component Value Date/Time    Protein, total 5.6 (L) 07/12/2022 06:24 AM    Albumin 2.4 (L) 07/12/2022 06:24 AM           Patricia LEWIS-C

## 2023-01-25 NOTE — HOSPICE
[facility]  [phone]    Nursing Note   Patient Name: Melvin Aparicio  YOB: 1931  Age: 80 y.o. [address] [facility] RN Note:  Plan of care discussed with patients nurse. Received notification of death. Bereavement visit to family who are coping well and expressed their appreciation for the bedside care and hospice support during patient's admission. They were grateful for  support as they awaited to hear if Jessy Ruffin was available. They are accepting that it may not be possible for a visit but aware that messages have been left for Jessy Ruffin to return call Bereavement resources given to family.     Valerie Madsen, [address] [facility]  [phone]

## 2023-01-25 NOTE — PROGRESS NOTES
Problem: Falls - Risk of  Goal: *Absence of Falls  Description: Document Marbin Clay Fall Risk and appropriate interventions in the flowsheet. Outcome: Progressing Towards Goal  Note: Fall Risk Interventions:            Medication Interventions: Bed/chair exit alarm    Elimination Interventions: Bed/chair exit alarm              Problem: Patient Education: Go to Patient Education Activity  Goal: Patient/Family Education  Outcome: Progressing Towards Goal     Problem: Pressure Injury - Risk of  Goal: *Prevention of pressure injury  Description: Document Trung Scale and appropriate interventions in the flowsheet. Outcome: Progressing Towards Goal  Note: Pressure Injury Interventions:  Sensory Interventions: Assess changes in LOC    Moisture Interventions: Absorbent underpads    Activity Interventions: Assess need for specialty bed    Mobility Interventions: HOB 30 degrees or less    Nutrition Interventions: Discuss nutritional consult with provider    Friction and Shear Interventions: Minimize layers                Problem: Patient Education: Go to Patient Education Activity  Goal: Patient/Family Education  Outcome: Progressing Towards Goal     Problem: Hospice Orientation  Goal: Demonstrate understanding of hospice philosophy, plan of care, and home hospice program  Description: The patient/family/caregiver will demonstrate understanding of hospice philosophy, plan of care and the home hospice program as evidenced by participation in meeting the patient's psychosocial, spiritual, medical, and physical needs inclusive of medical supplies/equipment focusing on symptoms. Outcome: Progressing Towards Goal     Problem: Potential for Alteration in Skin Integrity  Goal: Monitor skin for areas of alteration in skin integrity  Description: Patient/family/caregiver will demonstrate ability to care for patient's skin, monitor for areas of breakdown, and demonstrate methods to prevent breakdown during hospice care.   Outcome: Progressing Towards Goal     Problem: Risk for Falls  Goal: Free of falls during inpatient stay  Description: Patient will be free of falls during inpatient stay. Outcome: Progressing Towards Goal     Problem: Pain  Goal: *Control of acute pain  Outcome: Progressing Towards Goal     Problem: Breathing Pattern - Ineffective  Goal: *Use of effective breathing techniques  Outcome: Progressing Towards Goal     Problem: General Wound Care  Goal: *Non-infected wound: Improvement of existing wound, absence of infection, and maintenance of skin integrity  Outcome: Progressing Towards Goal  Goal: *Infected Wound: Prevention of further infection  Description: Infection control procedures (eg: clean dressings, clean gloves, hand washing, precautions to isolate wound from contamination, sterile instruments used for wound debridement) should be implemented.   Outcome: Progressing Towards Goal  Goal: Interventions  Outcome: Progressing Towards Goal     Problem: Imminent Death  Goal: Collaborate with patient/family/caregiver/interdisciplinary team to minimize and manage end of life symptoms  Outcome: Progressing Towards Goal

## 2023-01-25 NOTE — PROGRESS NOTES
Was contacted by [facility] triage. [facility] pt  and family was requesting  visit. Followed-up with Trish Ralph on call. Who stated that he had called for a  already. Writer contacted a different . Rafael Killian from [place]. VM was left on emergency line. Attempted to contact pt's Beatris Olmedo. Voicemail was left for her. Coordinated with pt's Nurse Steph Cramer.  Ultimately Hussein Sheets was not contacted and family is leaving hospital without visit from [facility].

## 2023-01-25 NOTE — HOSPICE
[facility]  [phone]    Discharge/Death Nursing Note   Patient Name: Erin Montaño  YOB: 1931  Age: 80 y.o. Date of Death: 23  Admitted Date: 2023  Time of Death: [address] of Care: [facility]  Level of Care: [facility]  Patient Room: [facility]/     Hospice Attending: April Graf MD  Hospice Diagnosis: Acute respiratory failure [facility]) [J96.00]    Death Pronouncement   Pronouncement of death completed by: Janice Leiva, JALEN and Kayla Lee RN    Agency staff was not present at the time of death    At the time of death the patient was documented as: Patient without respirations, pulse, pupils fixed. The pt  within [facility]    The following were notified of the patient's death: MD, , nursing supvr, [employer],     Medications were disposed of per facility protocol     Discharge Summary   Discharge Reason: Death    Summary of Care Provided:    [x] Post mortem care provided by nursing staff  [x] Notification of  home by nursing supvr  [x] Referrals/Community resources provided:   [x] Goals completed  [] Durable Medical Equipment vendor notified     Disciplines involved: [x] RN [] SW [x]  [] KOVACS [] Vol [] PT [] OT [] ST [] BC    [x] IDT communication/notification    Attending Physician, Dr. Renée Glover, notified of death    Bereaved   Verify bereaved identified with name, address, telephone number and risk level      Advance Care Planning 2022   Confirm Advance Directive None   Patient Would Like to Complete Advance Directive Unable        Jethro Medellin, niece ([phone]). Bereavement low.  Ludivina Aschoff,  to serve

## 2023-01-25 NOTE — PROGRESS NOTES
Unit RN paged  that Pt's family is requesting  to provide the anointing of the sick for Pt.  called father Ifrah liu twice. Second time left message of the request and to call ICU unit for room 225. Please contact Spirit[facility] for any further referrals.     Chaplain Carmel Avery M.Div.  Karin Skinner ([id number])

## 2023-01-26 NOTE — DISCHARGE SUMMARY
Hospice Discharge Summary    [facility]  [facility]        Date of Admission: 1/23/2023  Date of Discharge: 1/25/2023    Nay Davies is a 80y.o. year old who was admitted to [facility] at [facility] with a Hospice diagnosis of Acute respiratory failure ([address] [J96.00]. The patient's care was focused on comfort and the patient passed away on 1/25/2023.

## 2023-01-28 NOTE — DISCHARGE SUMMARY
.    Discharge summary    68-year-old male with history of dementia and Parkinson's disease also with peripheral vascular disease on hospice care. He was not comfortable and pain is not controlled, they are following with [facility]. Who recommended to come to the hospital for better control for hospital admission. Patient came in the evening, at that time we do not have the staff available to do the admission from the hospice so we admitted overnight so that hospice physician services will take care in the morning. On 23rd morning was evaluated, and hospice paperwork is done, transfer services to the hospice. This was done later in the afternoon, in the morning patient is in good control with altered mental status with no respiratory distress or in pain.

## (undated) DEVICE — HYPODERMIC SAFETY NEEDLE: Brand: MONOJECT

## (undated) DEVICE — GARMENT,MEDLINE,DVT,INT,CALF,MED, GEN2: Brand: MEDLINE

## (undated) DEVICE — SUTURE VCRL + SZ 2-0 L27IN ABSRB UD CP-1 1/2 CIR REV CUT VCP266H

## (undated) DEVICE — INTENDED FOR TISSUE SEPARATION, AND OTHER PROCEDURES THAT REQUIRE A SHARP SURGICAL BLADE TO PUNCTURE OR CUT.: Brand: BARD-PARKER ® CARBON RIB-BACK BLADES

## (undated) DEVICE — SOLUTION IRRIG 1000ML 0.9% SOD CHL USP POUR PLAS BTL

## (undated) DEVICE — SOLUTION SURG PREP 26 CC PURPREP

## (undated) DEVICE — OPTIFOAM GENTLE LIQUITRAP, SACRUM, 9X9: Brand: MEDLINE

## (undated) DEVICE — GLOVE ORANGE PI 7 1/2   MSG9075

## (undated) DEVICE — GLOVE SURG SZ 8 L12IN FNGR THK79MIL GRN LTX FREE

## (undated) DEVICE — GUIDE WIRE, BALL-TIPPED, STERILE

## (undated) DEVICE — 3M™ COBAN™ NL STERILE NON-LATEX SELF-ADHERENT WRAP, 2084S, 4 IN X 5 YD (10 CM X 4,5 M), 18 ROLLS/CASE: Brand: 3M™ COBAN™

## (undated) DEVICE — BLADE ELECTRODE: Brand: EDGE

## (undated) DEVICE — DRAPE,U/ SHT,SPLIT,PLAS,STERIL: Brand: MEDLINE

## (undated) DEVICE — DRAPE,ISOLATION,INCISE,INVISISHIELD: Brand: MEDLINE

## (undated) DEVICE — GLOVE SURG SZ 9 L12IN FNGR THK79MIL GRN LTX FREE

## (undated) DEVICE — MAJOR ORTHO PROCEDURE PACK: Brand: MEDLINE INDUSTRIES, INC.

## (undated) DEVICE — SUT VCRL + 1 27IN OS6 VIO --

## (undated) DEVICE — LAMINECTOMY ARM CRADLE FOAM POSITIONER: Brand: CARDINAL HEALTH

## (undated) DEVICE — K-WIRE

## (undated) DEVICE — 3M™ TEGADERM™ TRANSPARENT FILM DRESSING FRAME STYLE, 1629, 8 IN X 12 IN (20 CM X 30 CM), 10/CT 8CT/CASE: Brand: 3M™ TEGADERM™

## (undated) DEVICE — DRILL, AO SMALL: Brand: GAMMA

## (undated) DEVICE — SYR 20ML LL STRL LF --

## (undated) DEVICE — SOUTHSIDE TURNOVER: Brand: MEDLINE INDUSTRIES, INC.

## (undated) DEVICE — STERILE-Z BACK TABLE COVER - EXTRA LARGE: Brand: STERILE-Z

## (undated) DEVICE — TAPE,CLOTH/SILK,CURAD,3"X10YD,LF,40/CS: Brand: CURAD

## (undated) DEVICE — BASIC SINGLE BASIN-LF: Brand: MEDLINE INDUSTRIES, INC.

## (undated) DEVICE — GLOVE SURG SZ 85 CRM LTX FREE POLYISOPRENE POLYMER BEAD ANTI